# Patient Record
Sex: MALE | Race: ASIAN | Employment: OTHER | URBAN - METROPOLITAN AREA
[De-identification: names, ages, dates, MRNs, and addresses within clinical notes are randomized per-mention and may not be internally consistent; named-entity substitution may affect disease eponyms.]

---

## 2018-04-25 ENCOUNTER — LAB ENCOUNTER (OUTPATIENT)
Dept: LAB | Age: 53
End: 2018-04-25
Attending: INTERNAL MEDICINE
Payer: MEDICAID

## 2018-04-25 DIAGNOSIS — J45.40 ASTHMA, MODERATE PERSISTENT: Primary | ICD-10-CM

## 2018-04-25 DIAGNOSIS — H10.9 CONJUNCTIVITIS: ICD-10-CM

## 2018-04-25 DIAGNOSIS — J30.2 SEASONAL ALLERGIC RHINITIS: ICD-10-CM

## 2018-04-25 PROCEDURE — 86003 ALLG SPEC IGE CRUDE XTRC EA: CPT

## 2018-04-25 PROCEDURE — 80053 COMPREHEN METABOLIC PANEL: CPT

## 2018-04-25 PROCEDURE — 85025 COMPLETE CBC W/AUTO DIFF WBC: CPT

## 2018-04-25 PROCEDURE — 82785 ASSAY OF IGE: CPT

## 2018-04-25 PROCEDURE — 84443 ASSAY THYROID STIM HORMONE: CPT

## 2018-04-25 PROCEDURE — 36415 COLL VENOUS BLD VENIPUNCTURE: CPT

## 2018-04-25 PROCEDURE — 84439 ASSAY OF FREE THYROXINE: CPT

## 2019-09-24 ENCOUNTER — OFFICE VISIT (OUTPATIENT)
Dept: FAMILY MEDICINE CLINIC | Facility: CLINIC | Age: 54
End: 2019-09-24
Payer: MEDICAID

## 2019-09-24 ENCOUNTER — LAB ENCOUNTER (OUTPATIENT)
Dept: LAB | Age: 54
End: 2019-09-24
Attending: FAMILY MEDICINE
Payer: MEDICAID

## 2019-09-24 VITALS
BODY MASS INDEX: 25.48 KG/M2 | DIASTOLIC BLOOD PRESSURE: 80 MMHG | SYSTOLIC BLOOD PRESSURE: 112 MMHG | WEIGHT: 182 LBS | HEIGHT: 71 IN | TEMPERATURE: 98 F | RESPIRATION RATE: 16 BRPM | HEART RATE: 68 BPM

## 2019-09-24 DIAGNOSIS — Z12.5 SCREENING FOR MALIGNANT NEOPLASM OF PROSTATE: ICD-10-CM

## 2019-09-24 DIAGNOSIS — M25.50 ARTHRALGIA, UNSPECIFIED JOINT: ICD-10-CM

## 2019-09-24 DIAGNOSIS — Z00.00 WELLNESS EXAMINATION: ICD-10-CM

## 2019-09-24 DIAGNOSIS — Z12.11 SCREEN FOR COLON CANCER: ICD-10-CM

## 2019-09-24 DIAGNOSIS — Z00.00 WELLNESS EXAMINATION: Primary | ICD-10-CM

## 2019-09-24 PROBLEM — J45.20 MILD INTERMITTENT ASTHMA WITHOUT COMPLICATION (HCC): Status: ACTIVE | Noted: 2019-09-24

## 2019-09-24 PROBLEM — E78.2 MIXED HYPERLIPIDEMIA: Status: ACTIVE | Noted: 2019-09-24

## 2019-09-24 PROBLEM — J30.9 ALLERGIC RHINITIS: Status: ACTIVE | Noted: 2019-09-24

## 2019-09-24 PROBLEM — J45.20 MILD INTERMITTENT ASTHMA WITHOUT COMPLICATION: Status: ACTIVE | Noted: 2019-09-24

## 2019-09-24 LAB
ALBUMIN SERPL-MCNC: 3.8 G/DL (ref 3.4–5)
ALBUMIN/GLOB SERPL: 1.2 {RATIO} (ref 1–2)
ALP LIVER SERPL-CCNC: 81 U/L (ref 45–117)
ALT SERPL-CCNC: 34 U/L (ref 16–61)
ANION GAP SERPL CALC-SCNC: 6 MMOL/L (ref 0–18)
AST SERPL-CCNC: 28 U/L (ref 15–37)
BASOPHILS # BLD AUTO: 0.07 X10(3) UL (ref 0–0.2)
BASOPHILS NFR BLD AUTO: 1.1 %
BILIRUB SERPL-MCNC: 0.7 MG/DL (ref 0.1–2)
BUN BLD-MCNC: 18 MG/DL (ref 7–18)
BUN/CREAT SERPL: 19.8 (ref 10–20)
CALCIUM BLD-MCNC: 9.4 MG/DL (ref 8.5–10.1)
CHLORIDE SERPL-SCNC: 106 MMOL/L (ref 98–112)
CHOLEST SMN-MCNC: 168 MG/DL (ref ?–200)
CO2 SERPL-SCNC: 27 MMOL/L (ref 21–32)
COMPLEXED PSA SERPL-MCNC: 1.62 NG/ML (ref ?–4)
CREAT BLD-MCNC: 0.91 MG/DL (ref 0.7–1.3)
DEPRECATED RDW RBC AUTO: 43.3 FL (ref 35.1–46.3)
EOSINOPHIL # BLD AUTO: 0.5 X10(3) UL (ref 0–0.7)
EOSINOPHIL NFR BLD AUTO: 8.1 %
ERYTHROCYTE [DISTWIDTH] IN BLOOD BY AUTOMATED COUNT: 13.1 % (ref 11–15)
GLOBULIN PLAS-MCNC: 3.3 G/DL (ref 2.8–4.4)
GLUCOSE BLD-MCNC: 82 MG/DL (ref 70–99)
HCT VFR BLD AUTO: 42.3 % (ref 39–53)
HDLC SERPL-MCNC: 48 MG/DL (ref 40–59)
HGB BLD-MCNC: 14.2 G/DL (ref 13–17.5)
IMM GRANULOCYTES # BLD AUTO: 0.02 X10(3) UL (ref 0–1)
IMM GRANULOCYTES NFR BLD: 0.3 %
LDLC SERPL CALC-MCNC: 86 MG/DL (ref ?–100)
LYMPHOCYTES # BLD AUTO: 2.71 X10(3) UL (ref 1–4)
LYMPHOCYTES NFR BLD AUTO: 44.1 %
M PROTEIN MFR SERPL ELPH: 7.1 G/DL (ref 6.4–8.2)
MCH RBC QN AUTO: 30 PG (ref 26–34)
MCHC RBC AUTO-ENTMCNC: 33.6 G/DL (ref 31–37)
MCV RBC AUTO: 89.4 FL (ref 80–100)
MONOCYTES # BLD AUTO: 0.51 X10(3) UL (ref 0.1–1)
MONOCYTES NFR BLD AUTO: 8.3 %
NEUTROPHILS # BLD AUTO: 2.33 X10 (3) UL (ref 1.5–7.7)
NEUTROPHILS # BLD AUTO: 2.33 X10(3) UL (ref 1.5–7.7)
NEUTROPHILS NFR BLD AUTO: 38.1 %
NONHDLC SERPL-MCNC: 120 MG/DL (ref ?–130)
OSMOLALITY SERPL CALC.SUM OF ELEC: 289 MOSM/KG (ref 275–295)
PLATELET # BLD AUTO: 228 10(3)UL (ref 150–450)
POTASSIUM SERPL-SCNC: 3.8 MMOL/L (ref 3.5–5.1)
RBC # BLD AUTO: 4.73 X10(6)UL (ref 4.3–5.7)
SED RATE-ML: 9 MM/HR (ref 0–12)
SODIUM SERPL-SCNC: 139 MMOL/L (ref 136–145)
T4 FREE SERPL-MCNC: 0.9 NG/DL (ref 0.8–1.7)
TRIGL SERPL-MCNC: 168 MG/DL (ref 30–149)
TSI SER-ACNC: 4.11 MIU/ML (ref 0.36–3.74)
VLDLC SERPL CALC-MCNC: 34 MG/DL (ref 0–30)
WBC # BLD AUTO: 6.1 X10(3) UL (ref 4–11)

## 2019-09-24 PROCEDURE — 80061 LIPID PANEL: CPT

## 2019-09-24 PROCEDURE — 85652 RBC SED RATE AUTOMATED: CPT

## 2019-09-24 PROCEDURE — 99203 OFFICE O/P NEW LOW 30 MIN: CPT | Performed by: FAMILY MEDICINE

## 2019-09-24 PROCEDURE — 90686 IIV4 VACC NO PRSV 0.5 ML IM: CPT | Performed by: FAMILY MEDICINE

## 2019-09-24 PROCEDURE — 36415 COLL VENOUS BLD VENIPUNCTURE: CPT

## 2019-09-24 PROCEDURE — 90471 IMMUNIZATION ADMIN: CPT | Performed by: FAMILY MEDICINE

## 2019-09-24 PROCEDURE — 84439 ASSAY OF FREE THYROXINE: CPT

## 2019-09-24 PROCEDURE — 99386 PREV VISIT NEW AGE 40-64: CPT | Performed by: FAMILY MEDICINE

## 2019-09-24 PROCEDURE — 85025 COMPLETE CBC W/AUTO DIFF WBC: CPT

## 2019-09-24 PROCEDURE — 80053 COMPREHEN METABOLIC PANEL: CPT

## 2019-09-24 PROCEDURE — 84443 ASSAY THYROID STIM HORMONE: CPT

## 2019-09-24 RX ORDER — ATORVASTATIN CALCIUM 10 MG/1
TABLET, FILM COATED ORAL
Refills: 0 | COMMUNITY
Start: 2019-09-09 | End: 2019-10-24

## 2019-09-24 RX ORDER — ALBUTEROL SULFATE 90 UG/1
AEROSOL, METERED RESPIRATORY (INHALATION)
Refills: 3 | COMMUNITY
Start: 2019-07-31

## 2019-09-24 RX ORDER — MONTELUKAST SODIUM 10 MG/1
TABLET ORAL
Refills: 5 | COMMUNITY
Start: 2019-09-06

## 2019-09-24 RX ORDER — AZELASTINE HYDROCHLORIDE 0.5 MG/ML
SOLUTION/ DROPS OPHTHALMIC
Refills: 2 | COMMUNITY
Start: 2019-09-05 | End: 2021-12-22 | Stop reason: ALTCHOICE

## 2019-09-24 RX ORDER — CETIRIZINE HYDROCHLORIDE 10 MG/1
10 TABLET ORAL 2 TIMES DAILY
COMMUNITY

## 2019-09-24 NOTE — PATIENT INSTRUCTIONS
Thank you for choosing Juana Medellin MD at [place] [address]  To Do: [address]  1. Please see age appropriate health prevention below    [facility] is located in [address]. Monday, Tuesday & Friday – 8 a.m. to 4 p.m.   Wednesday, Thursda the benefits outweigh those potential risks and we strive to make you healthier and to improve your quality of life.     Referrals, and Radiology Information:    If your insurance requires a referral to a specialist, please allow 5 business days to process

## 2019-09-24 NOTE — H&P
Wellness Exam    REASON FOR VISIT:    Tang Price is a 48year old male who presents for an [address].     Current Complaints: Mr. Jerris Cranker is a pleasant 49 y/o M here for his wellness exam  Flu Shot: see immunization record  Health Maintenance Hepatitis B, Tetanus, or Pneumococcal?: No    Domestic Abuse: No     CAGE:     Cut : No    Annoyed : No    Guilty : No    Eye Opener : No    Scoring  Total Score: 0     PHQ-4: Over the LAST 2 WEEKS       Depression Screening (PHQ-2/PHQ-9): Over the LAST 2 Sodium 10 MG Oral Tab TK 1 T PO QD HS Disp:  Rfl: 5   atorvastatin 10 MG Oral Tab TK 1 T PO QD Disp:  Rfl: 0   Azelastine HCl 0.05 % Ophthalmic Solution INSTILL 1 DROP IN EACH EYE PRN Disp:  Rfl: 2   cetirizine 10 MG Oral Tab Take 10 mg by mouth daily.  Dis Ear: TMs visible and normal bilaterally  Nose: Nose normal.   Eyes: EOM are normal. Pupils are equal, round, and reactive to light. No scleral icterus. Neck: Normal range of motion. No thyromegaly present.    Cardiovascular: Normal rate, regular rhythm Future    Other orders  -     FLULAVAL INFLUENZA VACCINE QUAD PRESERVATIVE FREE 0.5 ML    Follow-up in 6 months or as needed    Orders Placed This Encounter      CBC With Differential With Platelet      Comp Metabolic Panel (14)      Lipid Panel      TSH W

## 2019-09-26 ENCOUNTER — TELEPHONE (OUTPATIENT)
Dept: FAMILY MEDICINE CLINIC | Facility: CLINIC | Age: 54
End: 2019-09-26

## 2019-09-26 NOTE — TELEPHONE ENCOUNTER
Spoke with pt, states that he was given a referral for Dr. Ketan Gardiner for his colonoscopy. Pt states that Dr. Ketan Gardiner does not take his insurance.  Advised pt to call phone # on the back of his insurance card to find out who is covered and to call back to let

## 2019-09-26 NOTE — TELEPHONE ENCOUNTER
Adams Mahoney Pt was trying to schedule a colonoscopy and was told they do not accept his insurance. Can you tell the patient were they can go for this procedure? Will patient need a new order written?     Please call [phone]

## 2019-10-22 ENCOUNTER — TELEPHONE (OUTPATIENT)
Dept: FAMILY MEDICINE CLINIC | Facility: CLINIC | Age: 54
End: 2019-10-22

## 2019-10-22 DIAGNOSIS — E03.9 HYPOTHYROIDISM, UNSPECIFIED TYPE: Primary | ICD-10-CM

## 2019-10-22 NOTE — TELEPHONE ENCOUNTER
Pt inquiring about lab results, labs done 9/24/19, please advise.     TSH: 4.1 - no dx of hypothyroidism, pt not on any medications

## 2019-10-22 NOTE — TELEPHONE ENCOUNTER
Recommend starting on levothyroxine 50 mcg daily. Please check thyroid ultrasound.   Recheck TSH and T4 in 6 weeks per

## 2019-10-23 NOTE — TELEPHONE ENCOUNTER
Spoke to pt, informed of MD recommendations below. Pt states he has an appt tomorrow, 10/24/19, and would like to discuss results at appt before starting medications. Repeat labs and US ordered.

## 2019-10-24 ENCOUNTER — OFFICE VISIT (OUTPATIENT)
Dept: FAMILY MEDICINE CLINIC | Facility: CLINIC | Age: 54
End: 2019-10-24
Payer: MEDICAID

## 2019-10-24 VITALS
TEMPERATURE: 98 F | HEIGHT: 71 IN | DIASTOLIC BLOOD PRESSURE: 70 MMHG | BODY MASS INDEX: 26.18 KG/M2 | HEART RATE: 70 BPM | RESPIRATION RATE: 16 BRPM | SYSTOLIC BLOOD PRESSURE: 120 MMHG | WEIGHT: 187 LBS

## 2019-10-24 DIAGNOSIS — E03.9 HYPOTHYROIDISM, UNSPECIFIED TYPE: Primary | ICD-10-CM

## 2019-10-24 DIAGNOSIS — E78.2 MIXED HYPERLIPIDEMIA: ICD-10-CM

## 2019-10-24 PROCEDURE — 99213 OFFICE O/P EST LOW 20 MIN: CPT | Performed by: FAMILY MEDICINE

## 2019-10-24 RX ORDER — LEVOTHYROXINE SODIUM 0.05 MG/1
50 TABLET ORAL
Qty: 90 TABLET | Refills: 1 | Status: SHIPPED | OUTPATIENT
Start: 2019-10-24 | End: 2020-02-17

## 2019-10-24 RX ORDER — ATORVASTATIN CALCIUM 10 MG/1
10 TABLET, FILM COATED ORAL NIGHTLY
Qty: 90 TABLET | Refills: 1 | Status: SHIPPED | OUTPATIENT
Start: 2019-10-24 | End: 2021-03-08

## 2019-10-24 NOTE — PROGRESS NOTES
HPI:    Patient ID: Manuel Leonard is a 48year old male.     HPI  Mr. Simin Mehta is a pleasant 49 y/o M with history of dyslipidemia and allergies here for his follow-up appointment he had labs done in September which showed normal LDL levels which was less than 10 heard.  Pulmonary/Chest: Effort normal and breath sounds normal. No respiratory distress. He has no wheezes. He has no rales. Abdominal: Soft. Bowel sounds are normal. He exhibits no distension. There is no tenderness.  Musculoskeletal:         General: N

## 2019-10-24 NOTE — PATIENT INSTRUCTIONS
Thank you for choosing Wanda Apodaca MD at [place] [address]  To Do: [address]  1. PLEASE HAVE TSH AND RAYNE SCREEN DONE IN 6-8 weeks. [facility] is located in [address]. Monday, Tuesday & Friday – 8 a.m. to 4 p.m.   Wednesday, Thursday – 7 benefits outweigh those potential risks and we strive to make you healthier and to improve your quality of life.     Referrals, and Radiology Information:    If your insurance requires a referral to a specialist, please allow 5 business days to process your

## 2019-12-04 ENCOUNTER — OFFICE VISIT (OUTPATIENT)
Dept: FAMILY MEDICINE CLINIC | Facility: CLINIC | Age: 54
End: 2019-12-04
Payer: MEDICAID

## 2019-12-04 VITALS
WEIGHT: 186 LBS | TEMPERATURE: 99 F | BODY MASS INDEX: 26.04 KG/M2 | DIASTOLIC BLOOD PRESSURE: 70 MMHG | RESPIRATION RATE: 16 BRPM | SYSTOLIC BLOOD PRESSURE: 120 MMHG | HEART RATE: 64 BPM | HEIGHT: 71 IN

## 2019-12-04 DIAGNOSIS — G89.29 CHRONIC RIGHT SHOULDER PAIN: ICD-10-CM

## 2019-12-04 DIAGNOSIS — H92.02 LEFT EAR PAIN: Primary | ICD-10-CM

## 2019-12-04 DIAGNOSIS — M25.511 CHRONIC RIGHT SHOULDER PAIN: ICD-10-CM

## 2019-12-04 PROCEDURE — 99214 OFFICE O/P EST MOD 30 MIN: CPT | Performed by: FAMILY MEDICINE

## 2019-12-04 NOTE — PROGRESS NOTES
HPI:    Patient ID: Jerry Hull is a 48year old male. HPI  Mr. Betzy Dodge is a pleasant 32-year-old gentleman with history of hypothyroidism, hyperlipidemia, asthma, allergies here today for left ear pain which has been ongoing for the past 3 weeks.   He kassandra breakfast. 90 tablet 1   • Albuterol Sulfate  (90 Base) MCG/ACT Inhalation Aero Soln INL 2 PFS PO Q 6 H PRN  3   • Montelukast Sodium 10 MG Oral Tab TK 1 T PO QD HS  5   • Azelastine HCl 0.05 % Ophthalmic Solution INSTILL 1 DROP IN EACH EYE PRN  2 Referrals:  ENT - INTERNAL  MRI SHOULDER, RIGHT (CPT=73221)       [id number]

## 2019-12-04 NOTE — PATIENT INSTRUCTIONS
Thank you for choosing Marin Reeder MD at [facility]  To Do: [address]  1. Please have MRI done as ordered  [facility] is located in [address]. Monday, Tuesday & Friday – 8 a.m. to 4 p.m. Wednesday, Thursday – 7 a.m. to 3 p.m.   Feli Sadler those potential risks and we strive to make you healthier and to improve your quality of life.     Referrals, and Radiology Information:    If your insurance requires a referral to a specialist, please allow 5 business days to process your referral request.

## 2019-12-23 ENCOUNTER — LAB ENCOUNTER (OUTPATIENT)
Dept: LAB | Age: 54
End: 2019-12-23
Attending: FAMILY MEDICINE
Payer: MEDICAID

## 2019-12-23 ENCOUNTER — OFFICE VISIT (OUTPATIENT)
Dept: FAMILY MEDICINE CLINIC | Facility: CLINIC | Age: 54
End: 2019-12-23
Payer: MEDICAID

## 2019-12-23 VITALS
RESPIRATION RATE: 16 BRPM | HEIGHT: 71 IN | BODY MASS INDEX: 26.04 KG/M2 | WEIGHT: 186 LBS | DIASTOLIC BLOOD PRESSURE: 80 MMHG | SYSTOLIC BLOOD PRESSURE: 120 MMHG | TEMPERATURE: 98 F | HEART RATE: 60 BPM

## 2019-12-23 DIAGNOSIS — M25.512 CHRONIC PAIN OF BOTH SHOULDERS: ICD-10-CM

## 2019-12-23 DIAGNOSIS — G89.29 CHRONIC PAIN OF BOTH SHOULDERS: ICD-10-CM

## 2019-12-23 DIAGNOSIS — E03.9 HYPOTHYROIDISM, UNSPECIFIED TYPE: ICD-10-CM

## 2019-12-23 DIAGNOSIS — J06.9 URTI (ACUTE UPPER RESPIRATORY INFECTION): Primary | ICD-10-CM

## 2019-12-23 DIAGNOSIS — M25.511 CHRONIC PAIN OF BOTH SHOULDERS: ICD-10-CM

## 2019-12-23 PROCEDURE — 99213 OFFICE O/P EST LOW 20 MIN: CPT | Performed by: FAMILY MEDICINE

## 2019-12-23 PROCEDURE — 84443 ASSAY THYROID STIM HORMONE: CPT

## 2019-12-23 PROCEDURE — 84439 ASSAY OF FREE THYROXINE: CPT

## 2019-12-23 PROCEDURE — 86038 ANTINUCLEAR ANTIBODIES: CPT

## 2019-12-23 PROCEDURE — 36415 COLL VENOUS BLD VENIPUNCTURE: CPT

## 2019-12-23 RX ORDER — AZITHROMYCIN 250 MG/1
TABLET, FILM COATED ORAL
Qty: 6 TABLET | Refills: 0 | Status: SHIPPED | OUTPATIENT
Start: 2019-12-23 | End: 2020-01-04 | Stop reason: ALTCHOICE

## 2020-01-04 ENCOUNTER — OFFICE VISIT (OUTPATIENT)
Dept: FAMILY MEDICINE CLINIC | Facility: CLINIC | Age: 55
End: 2020-01-04
Payer: MEDICAID

## 2020-01-04 VITALS
DIASTOLIC BLOOD PRESSURE: 72 MMHG | RESPIRATION RATE: 16 BRPM | HEART RATE: 72 BPM | WEIGHT: 186 LBS | SYSTOLIC BLOOD PRESSURE: 112 MMHG | OXYGEN SATURATION: 97 % | TEMPERATURE: 98 F | HEIGHT: 71 IN | BODY MASS INDEX: 26.04 KG/M2

## 2020-01-04 DIAGNOSIS — M25.50 ARTHRALGIA, UNSPECIFIED JOINT: ICD-10-CM

## 2020-01-04 DIAGNOSIS — R79.89 ELEVATED TSH: ICD-10-CM

## 2020-01-04 DIAGNOSIS — R05.3 CHRONIC COUGH: Primary | ICD-10-CM

## 2020-01-04 PROCEDURE — 99214 OFFICE O/P EST MOD 30 MIN: CPT | Performed by: FAMILY MEDICINE

## 2020-01-04 RX ORDER — AMOXICILLIN AND CLAVULANATE POTASSIUM 875; 125 MG/1; MG/1
TABLET, FILM COATED ORAL
COMMUNITY
Start: 2019-12-28 | End: 2020-02-17

## 2020-01-04 NOTE — PATIENT INSTRUCTIONS
Thank you for choosing Cherelle Beltran MD at [place] [address]  To Do: [address]  1. Please take meds as directed. [facility] is located in [address]. Monday, Tuesday & Friday – 8 a.m. to 4 p.m. Wednesday, Thursday – 7 a.m. to 3 p.m.   Aniket Vargas those potential risks and we strive to make you healthier and to improve your quality of life.     Referrals, and Radiology Information:    If your insurance requires a referral to a specialist, please allow 5 business days to process your referral request.

## 2020-01-04 NOTE — PROGRESS NOTES
HPI:    Patient ID: Jayla Sullivan is a 48year old male.     HPI  Mr. Denney Primrose is a pleasant 16-year-old gentleman with history of hypothyroidism, hyperlipidemia, asthma, allergies here today for follow up for chronic cough and congestion and was treated by me wi (50 mcg total) by mouth before breakfast. (Patient not taking: Reported on 1/4/2020 ) 90 tablet 1     Allergies:No Known Allergies   PHYSICAL EXAM:   Physical Exam  Constitutional: No distress.    HENT:   Right Ear: Tympanic membrane, external ear and ear c

## 2020-02-17 PROBLEM — S09.302A: Status: ACTIVE | Noted: 2020-02-17

## 2020-02-25 ENCOUNTER — OFFICE VISIT (OUTPATIENT)
Dept: FAMILY MEDICINE CLINIC | Facility: CLINIC | Age: 55
End: 2020-02-25
Payer: MEDICAID

## 2020-02-25 VITALS
HEIGHT: 72 IN | HEART RATE: 97 BPM | RESPIRATION RATE: 16 BRPM | TEMPERATURE: 98 F | OXYGEN SATURATION: 98 % | WEIGHT: 185 LBS | BODY MASS INDEX: 25.06 KG/M2 | SYSTOLIC BLOOD PRESSURE: 124 MMHG | DIASTOLIC BLOOD PRESSURE: 80 MMHG

## 2020-02-25 DIAGNOSIS — L03.011 CELLULITIS OF INDEX FINGER, RIGHT: Primary | ICD-10-CM

## 2020-02-25 PROCEDURE — 99213 OFFICE O/P EST LOW 20 MIN: CPT | Performed by: FAMILY MEDICINE

## 2020-02-25 RX ORDER — CEPHALEXIN 500 MG/1
500 CAPSULE ORAL 3 TIMES DAILY
Qty: 30 CAPSULE | Refills: 0 | Status: SHIPPED | OUTPATIENT
Start: 2020-02-25 | End: 2020-03-06

## 2020-02-25 NOTE — PROGRESS NOTES
HPI:    Patient ID: Karmen Stoll is a 48year old male.     HPI  Mr. Fidelia Morin is a pleasant 53-year-old gentleman with history of allergies here today for swelling and redness of the right second finger which she noticed Friday while he was cleaning the area c Cellulitis of index finger, right  (primary encounter diagnosis)  -Educated on this condition; will treat with Keflex as below; keep protected at this point; I have asked him to go to the hospital if swelling in redness migrates to his arm.   If he develops

## 2020-02-25 NOTE — PATIENT INSTRUCTIONS
Thank you for choosing Yesenia Diaz MD at [place] [address]  To Do: [address]  1. Please take meds as directed. [facility] is located in [address]. Monday, Tuesday & Friday – 8 a.m. to 4 p.m. Wednesday, Thursday – 7 a.m. to 3 p.m. those potential risks and we strive to make you healthier and to improve your quality of life.     Referrals, and Radiology Information:    If your insurance requires a referral to a specialist, please allow 5 business days to process your referral request. number of days until they are gone. Keep taking the medicine even if your symptoms go away. Home care  Follow these tips:  · Limit the use of the part of your body with cellulitis.   · If the infection is on your leg, keep your leg raised while sitting.  Nuria May

## 2020-02-28 ENCOUNTER — OFFICE VISIT (OUTPATIENT)
Dept: FAMILY MEDICINE CLINIC | Facility: CLINIC | Age: 55
End: 2020-02-28
Payer: MEDICAID

## 2020-02-28 ENCOUNTER — HOSPITAL ENCOUNTER (OUTPATIENT)
Dept: GENERAL RADIOLOGY | Age: 55
Discharge: HOME | End: 2020-02-28
Attending: PHYSICIAN ASSISTANT
Payer: MEDICAID

## 2020-02-28 ENCOUNTER — LAB ENCOUNTER (OUTPATIENT)
Dept: LAB | Age: 55
End: 2020-02-28
Attending: FAMILY MEDICINE
Payer: MEDICAID

## 2020-02-28 VITALS
TEMPERATURE: 97 F | DIASTOLIC BLOOD PRESSURE: 72 MMHG | WEIGHT: 186 LBS | OXYGEN SATURATION: 95 % | HEIGHT: 72 IN | SYSTOLIC BLOOD PRESSURE: 122 MMHG | BODY MASS INDEX: 25.19 KG/M2 | HEART RATE: 84 BPM | RESPIRATION RATE: 14 BRPM

## 2020-02-28 DIAGNOSIS — M79.671 RIGHT FOOT PAIN: Primary | ICD-10-CM

## 2020-02-28 DIAGNOSIS — M79.671 RIGHT FOOT PAIN: ICD-10-CM

## 2020-02-28 LAB
ALBUMIN SERPL-MCNC: 3.4 G/DL (ref 3.4–5)
ALBUMIN/GLOB SERPL: 0.9 {RATIO} (ref 1–2)
ALP LIVER SERPL-CCNC: 86 U/L (ref 45–117)
ALT SERPL-CCNC: 32 U/L (ref 16–61)
ANION GAP SERPL CALC-SCNC: 4 MMOL/L (ref 0–18)
AST SERPL-CCNC: 24 U/L (ref 15–37)
BASOPHILS # BLD AUTO: 0.1 X10(3) UL (ref 0–0.2)
BASOPHILS NFR BLD AUTO: 1.7 %
BILIRUB SERPL-MCNC: 1 MG/DL (ref 0.1–2)
BUN BLD-MCNC: 17 MG/DL (ref 7–18)
BUN/CREAT SERPL: 19.3 (ref 10–20)
CALCIUM BLD-MCNC: 8.7 MG/DL (ref 8.5–10.1)
CHLORIDE SERPL-SCNC: 108 MMOL/L (ref 98–112)
CO2 SERPL-SCNC: 26 MMOL/L (ref 21–32)
CREAT BLD-MCNC: 0.88 MG/DL (ref 0.7–1.3)
DEPRECATED RDW RBC AUTO: 42.8 FL (ref 35.1–46.3)
EOSINOPHIL # BLD AUTO: 0.54 X10(3) UL (ref 0–0.7)
EOSINOPHIL NFR BLD AUTO: 8.9 %
ERYTHROCYTE [DISTWIDTH] IN BLOOD BY AUTOMATED COUNT: 13 % (ref 11–15)
GLOBULIN PLAS-MCNC: 3.9 G/DL (ref 2.8–4.4)
GLUCOSE BLD-MCNC: 101 MG/DL (ref 70–99)
HCT VFR BLD AUTO: 42.4 % (ref 39–53)
HGB BLD-MCNC: 14.1 G/DL (ref 13–17.5)
IMM GRANULOCYTES # BLD AUTO: 0.01 X10(3) UL (ref 0–1)
IMM GRANULOCYTES NFR BLD: 0.2 %
LYMPHOCYTES # BLD AUTO: 2.78 X10(3) UL (ref 1–4)
LYMPHOCYTES NFR BLD AUTO: 46 %
M PROTEIN MFR SERPL ELPH: 7.3 G/DL (ref 6.4–8.2)
MCH RBC QN AUTO: 29.6 PG (ref 26–34)
MCHC RBC AUTO-ENTMCNC: 33.3 G/DL (ref 31–37)
MCV RBC AUTO: 89.1 FL (ref 80–100)
MONOCYTES # BLD AUTO: 0.42 X10(3) UL (ref 0.1–1)
MONOCYTES NFR BLD AUTO: 6.9 %
NEUTROPHILS # BLD AUTO: 2.2 X10 (3) UL (ref 1.5–7.7)
NEUTROPHILS # BLD AUTO: 2.2 X10(3) UL (ref 1.5–7.7)
NEUTROPHILS NFR BLD AUTO: 36.3 %
OSMOLALITY SERPL CALC.SUM OF ELEC: 288 MOSM/KG (ref 275–295)
PATIENT FASTING Y/N/NP: NO
PLATELET # BLD AUTO: 243 10(3)UL (ref 150–450)
POTASSIUM SERPL-SCNC: 3.9 MMOL/L (ref 3.5–5.1)
RBC # BLD AUTO: 4.76 X10(6)UL (ref 4.3–5.7)
SODIUM SERPL-SCNC: 138 MMOL/L (ref 136–145)
URATE SERPL-MCNC: 5.2 MG/DL (ref 3.5–7.2)
WBC # BLD AUTO: 6.1 X10(3) UL (ref 4–11)

## 2020-02-28 PROCEDURE — 36415 COLL VENOUS BLD VENIPUNCTURE: CPT

## 2020-02-28 PROCEDURE — 99213 OFFICE O/P EST LOW 20 MIN: CPT | Performed by: PHYSICIAN ASSISTANT

## 2020-02-28 PROCEDURE — 73630 X-RAY EXAM OF FOOT: CPT | Performed by: PHYSICIAN ASSISTANT

## 2020-02-28 PROCEDURE — 85025 COMPLETE CBC W/AUTO DIFF WBC: CPT

## 2020-02-28 PROCEDURE — 80053 COMPREHEN METABOLIC PANEL: CPT

## 2020-02-28 PROCEDURE — 84550 ASSAY OF BLOOD/URIC ACID: CPT

## 2020-02-28 RX ORDER — TRAMADOL HYDROCHLORIDE 50 MG/1
50 TABLET ORAL EVERY 8 HOURS PRN
Qty: 30 TABLET | Refills: 0 | Status: SHIPPED | OUTPATIENT
Start: 2020-02-28 | End: 2020-03-20

## 2020-02-28 RX ORDER — NAPROXEN 500 MG/1
500 TABLET ORAL 2 TIMES DAILY WITH MEALS
Qty: 30 TABLET | Refills: 0 | Status: SHIPPED | OUTPATIENT
Start: 2020-02-28 | End: 2020-03-20

## 2020-02-28 NOTE — PROGRESS NOTES
[facility] Internal Medicine Progress Note    CC:  Patient presents with: Foot Pain: right foot started yesterday morning. HPI:   HPI   Pain started yesterday right foot pain. He denies any injury. He states painful to even walk.   No hist Skin: Negative for color change, rash and wound. Neurological: Negative for dizziness, light-headedness and headaches.          /72   Pulse 84   Temp 97.4 °F (36.3 °C) (Temporal)   Resp 14   Ht 72\"   Wt 186 lb (84.4 kg)   SpO2 95%   BMI 25.23 kg/m² Sig: Take 1 tablet (50 mg total) by mouth every 8 (eight) hours as needed for Pain (Do not take and drive, will make drowsy).        Imaging & Consults:  XR FOOT, COMPLETE (MIN 3 VIEWS), RIGHT (CPT=73630)     Patient/Caregiver Education: Patient/Caregiver

## 2020-02-28 NOTE — PATIENT INSTRUCTIONS
Thank you for choosing Ludy Pope PA-C at [place] [address]  To Do: [address]  1. Begin medications as prescribed  2. Get xrays done  3.  Follow-up 1-2 weeks, sooner if problems    • Please signup for MY CHART, which is electronic access to yo your testing, please call Central Scheduling at [phone]  Please allow our office 5 business days to contact you regarding any testing results.     Refill policies:   Allow 3 business days for refills; controlled substances may take longer and must be p

## 2020-03-01 NOTE — PROGRESS NOTES
Labs are stable  Uric acid level is not elevated  Continue with current treatment plan and follow-up as directed

## 2020-03-20 ENCOUNTER — TELEPHONE (OUTPATIENT)
Dept: FAMILY MEDICINE CLINIC | Facility: CLINIC | Age: 55
End: 2020-03-20

## 2020-03-20 DIAGNOSIS — M79.671 RIGHT FOOT PAIN: ICD-10-CM

## 2020-03-20 RX ORDER — TRAMADOL HYDROCHLORIDE 50 MG/1
50 TABLET ORAL EVERY 8 HOURS PRN
Qty: 30 TABLET | Refills: 0 | Status: SHIPPED | OUTPATIENT
Start: 2020-03-20 | End: 2020-08-24

## 2020-03-20 RX ORDER — NAPROXEN 500 MG/1
500 TABLET ORAL 2 TIMES DAILY WITH MEALS
Qty: 30 TABLET | Refills: 0 | Status: SHIPPED | OUTPATIENT
Start: 2020-03-20 | End: 2020-04-20

## 2020-03-20 NOTE — TELEPHONE ENCOUNTER
Spoke to pt, informed of MD recommendations below. Advised pt to go to [facility] for evaluation and NOT to go to either of the ERs. Pt verbalized understanding and agreement. Task completed.

## 2020-03-20 NOTE — TELEPHONE ENCOUNTER
Patient states that he saw Giselle Vogel in 2/25/2020 for cellulitis in his finger. He says he completed abx that was given but he is still having issue and not getting better. Pt states he would like a call from  to discuss.  He is also asking for

## 2020-04-20 ENCOUNTER — VIRTUAL PHONE E/M (OUTPATIENT)
Dept: FAMILY MEDICINE CLINIC | Facility: CLINIC | Age: 55
End: 2020-04-20
Payer: MEDICAID

## 2020-04-20 DIAGNOSIS — M79.671 RIGHT FOOT PAIN: ICD-10-CM

## 2020-04-20 DIAGNOSIS — M79.644 FINGER PAIN, RIGHT: ICD-10-CM

## 2020-04-20 PROCEDURE — 99214 OFFICE O/P EST MOD 30 MIN: CPT | Performed by: FAMILY MEDICINE

## 2020-04-20 RX ORDER — NAPROXEN 500 MG/1
500 TABLET ORAL 2 TIMES DAILY WITH MEALS
Qty: 30 TABLET | Refills: 0 | Status: SHIPPED | OUTPATIENT
Start: 2020-04-20 | End: 2020-08-24

## 2020-04-20 NOTE — PATIENT INSTRUCTIONS
Thank you for choosing Murali Martinez MD at [place] [address]  To Do: [address] [facility]  1. Please take meds as directed. 2.  I had sent a referral for the foot doctor/podiatry  3. I had refilled naproxen and sent to the pharmacy  4.   Please have x-ray o harm or side effects or medication interactions.  It is your duty and for your safety to discuss with the pharmacist and our office with questions, and to notify us and stop treatment if problems arise, but know that our intention is that the benefits outw

## 2020-04-20 NOTE — PROGRESS NOTES
Virtual Telephone Check-In    Aline Montemayor verbally consents to a Virtual/Telephone Check-In visit on 04/20/20. Patient understands and accepts financial responsibility for any deductible, co-insurance and/or co-pays associated with this service.     Du Tab Take 10 mg by mouth daily.         Past Medical History:   Diagnosis Date   • Allergic rhinitis    • Asthma    • Hyperlipidemia       Social History:  Social History    Tobacco Use      Smoking status: Former Smoker      Smokeless tobacco: Former User

## 2020-05-05 ENCOUNTER — TELEPHONE (OUTPATIENT)
Dept: PODIATRY CLINIC | Facility: CLINIC | Age: 55
End: 2020-05-05

## 2020-05-09 ENCOUNTER — HOSPITAL ENCOUNTER (OUTPATIENT)
Dept: GENERAL RADIOLOGY | Age: 55
Discharge: HOME | End: 2020-05-09
Attending: FAMILY MEDICINE
Payer: MEDICAID

## 2020-05-09 DIAGNOSIS — M79.644 FINGER PAIN, RIGHT: ICD-10-CM

## 2020-05-09 PROCEDURE — 73130 X-RAY EXAM OF HAND: CPT | Performed by: FAMILY MEDICINE

## 2020-05-14 ENCOUNTER — TELEPHONE (OUTPATIENT)
Dept: FAMILY MEDICINE CLINIC | Facility: CLINIC | Age: 55
End: 2020-05-14

## 2020-05-14 NOTE — TELEPHONE ENCOUNTER
Spoke with pt, informed of normal R hand x-ray results. Pt verbalized understanding and agreement. All questions answered. Task completed.

## 2020-08-24 ENCOUNTER — OFFICE VISIT (OUTPATIENT)
Dept: FAMILY MEDICINE CLINIC | Facility: CLINIC | Age: 55
End: 2020-08-24
Payer: MEDICAID

## 2020-08-24 VITALS
WEIGHT: 190 LBS | OXYGEN SATURATION: 98 % | HEIGHT: 72 IN | HEART RATE: 77 BPM | DIASTOLIC BLOOD PRESSURE: 74 MMHG | RESPIRATION RATE: 16 BRPM | SYSTOLIC BLOOD PRESSURE: 122 MMHG | TEMPERATURE: 97 F | BODY MASS INDEX: 25.73 KG/M2

## 2020-08-24 DIAGNOSIS — E78.2 MIXED HYPERLIPIDEMIA: ICD-10-CM

## 2020-08-24 DIAGNOSIS — M54.9 ACUTE LEFT-SIDED BACK PAIN, UNSPECIFIED BACK LOCATION: Primary | ICD-10-CM

## 2020-08-24 PROCEDURE — 3008F BODY MASS INDEX DOCD: CPT | Performed by: FAMILY MEDICINE

## 2020-08-24 PROCEDURE — 3078F DIAST BP <80 MM HG: CPT | Performed by: FAMILY MEDICINE

## 2020-08-24 PROCEDURE — 3074F SYST BP LT 130 MM HG: CPT | Performed by: FAMILY MEDICINE

## 2020-08-24 PROCEDURE — 99214 OFFICE O/P EST MOD 30 MIN: CPT | Performed by: FAMILY MEDICINE

## 2020-08-24 RX ORDER — CYCLOBENZAPRINE HCL 10 MG
10 TABLET ORAL 3 TIMES DAILY
Qty: 30 TABLET | Refills: 1 | Status: SHIPPED | OUTPATIENT
Start: 2020-08-24 | End: 2020-09-13

## 2020-08-24 NOTE — PATIENT INSTRUCTIONS
Thank you for choosing Erin Alexandre MD at [place] [address]  To Do: [address]  1. Please take meds as directed. [facility] is located in [address]. Monday, Tuesday & Friday – 8 a.m. to 4 p.m. Wednesday, Thursday – 7 a.m. to 3 p.m. those potential risks and we strive to make you healthier and to improve your quality of life.     Referrals, and Radiology Information:    If your insurance requires a referral to a specialist, please allow 5 business days to process your referral request.

## 2020-08-24 NOTE — PROGRESS NOTES
HPI:    Patient ID: Chelsy Tyler is a 50year old male. HPI  Mr. Yaneth Vargas is a pleasant 53-year-old gentleman with history of asthma and hyperlipidemia here today for back pain which started several days ago.   It used to involve almost his entire back and • Azelastine HCl 0.05 % Ophthalmic Solution INSTILL 1 DROP IN EACH EYE PRN  2   • cetirizine 10 MG Oral Tab Take 10 mg by mouth daily. Allergies:No Known Allergies   PHYSICAL EXAM:   Physical Exam   Constitutional: No distress.    HENT:   Mouth/Throat

## 2020-11-04 ENCOUNTER — TELEMEDICINE (OUTPATIENT)
Dept: FAMILY MEDICINE CLINIC | Facility: CLINIC | Age: 55
End: 2020-11-04
Payer: MEDICAID

## 2020-11-04 DIAGNOSIS — R50.9 FEVER, UNSPECIFIED FEVER CAUSE: Primary | ICD-10-CM

## 2020-11-04 PROCEDURE — 99213 OFFICE O/P EST LOW 20 MIN: CPT | Performed by: FAMILY MEDICINE

## 2020-11-04 NOTE — PROGRESS NOTES
Video Check-In    Leroy Sesay verbally consents to a Virtual/Telephone Check-In service on 11/04/20. Patient understands and accepts financial responsibility for any deductible, co-insurance and/or co-pays associated with this service.     Duration of th

## 2020-11-04 NOTE — PATIENT INSTRUCTIONS
Thank you for choosing Julieth MD Dave at [place] [address]  To Do: [address] [facility]  1. Coronavirus Disease 2019 (COVID-19)     [facility] is committed to the safety and well-being of our patients, members, employees, and communities.  As co 2. Monitor your symptoms carefully. If your symptoms get worse, call your healthcare provider immediately. 3. Get rest and stay hydrated.    4. If you have a medical appointment, call the healthcare provider ahead of time and tell them that you have or may ? At least 24 hours have passed since recovery defined as resolution of fever without the use of fever-reducing medications; and  · Improvement in respiratory symptoms (e.g., cough, shortness of breath); and  · At least 10 days have passed since symptoms f If you would be interested in donating your plasma to help treat others diagnosed with the virus, please contact Ayanna directly on their website: [email or web address]    Who is eligible to donate convalescent plasma? • You can NOW use [employer] to book your appointments with us, or consider using open access scheduling which is through the edward website [email or web address] and type in Jose Head MD and follow the links for \"Schedule Online Now\"    •To sc Please allow our office 5 business days to contact you regarding any testing results. Refill policies:   Allow 3 business days for refills; controlled substances may take longer and must be picked up from the office in person.   Narcotic medications can

## 2020-11-06 ENCOUNTER — APPOINTMENT (OUTPATIENT)
Dept: LAB | Age: 55
End: 2020-11-06
Attending: FAMILY MEDICINE
Payer: MEDICAID

## 2020-11-06 DIAGNOSIS — R50.9 FEVER, UNSPECIFIED FEVER CAUSE: ICD-10-CM

## 2020-11-10 ENCOUNTER — TELEPHONE (OUTPATIENT)
Dept: FAMILY MEDICINE CLINIC | Facility: CLINIC | Age: 55
End: 2020-11-10

## 2020-11-10 NOTE — TELEPHONE ENCOUNTER
Called pt to answer his questions. Pt wanting to know when he can return to work. Reviewed CDC guidelines with pt, at least 10 days, symptom improvement, and nor fever for at least 24 hours without the use of fever reducing medications.  Also informed pt t

## 2020-12-01 ENCOUNTER — LAB ENCOUNTER (OUTPATIENT)
Dept: LAB | Age: 55
End: 2020-12-01
Attending: FAMILY MEDICINE
Payer: MEDICAID

## 2020-12-01 DIAGNOSIS — E78.2 MIXED HYPERLIPIDEMIA: ICD-10-CM

## 2020-12-01 PROCEDURE — 36415 COLL VENOUS BLD VENIPUNCTURE: CPT

## 2020-12-01 PROCEDURE — 80053 COMPREHEN METABOLIC PANEL: CPT

## 2020-12-01 PROCEDURE — 84443 ASSAY THYROID STIM HORMONE: CPT

## 2020-12-01 PROCEDURE — 80061 LIPID PANEL: CPT

## 2020-12-01 PROCEDURE — 85025 COMPLETE CBC W/AUTO DIFF WBC: CPT

## 2020-12-01 PROCEDURE — 84439 ASSAY OF FREE THYROXINE: CPT

## 2020-12-11 ENCOUNTER — OFFICE VISIT (OUTPATIENT)
Dept: FAMILY MEDICINE CLINIC | Facility: CLINIC | Age: 55
End: 2020-12-11
Payer: MEDICAID

## 2020-12-11 VITALS
WEIGHT: 185 LBS | HEIGHT: 72 IN | RESPIRATION RATE: 16 BRPM | OXYGEN SATURATION: 97 % | BODY MASS INDEX: 25.06 KG/M2 | HEART RATE: 84 BPM | SYSTOLIC BLOOD PRESSURE: 124 MMHG | TEMPERATURE: 98 F | DIASTOLIC BLOOD PRESSURE: 80 MMHG

## 2020-12-11 DIAGNOSIS — M19.90 ARTHRITIS: ICD-10-CM

## 2020-12-11 DIAGNOSIS — R04.0 EPISTAXIS: Primary | ICD-10-CM

## 2020-12-11 DIAGNOSIS — Z23 NEED FOR VACCINATION: ICD-10-CM

## 2020-12-11 DIAGNOSIS — E78.2 MIXED HYPERLIPIDEMIA: ICD-10-CM

## 2020-12-11 PROCEDURE — 3079F DIAST BP 80-89 MM HG: CPT | Performed by: FAMILY MEDICINE

## 2020-12-11 PROCEDURE — 99214 OFFICE O/P EST MOD 30 MIN: CPT | Performed by: FAMILY MEDICINE

## 2020-12-11 PROCEDURE — 3074F SYST BP LT 130 MM HG: CPT | Performed by: FAMILY MEDICINE

## 2020-12-11 PROCEDURE — 90686 IIV4 VACC NO PRSV 0.5 ML IM: CPT | Performed by: FAMILY MEDICINE

## 2020-12-11 PROCEDURE — 3008F BODY MASS INDEX DOCD: CPT | Performed by: FAMILY MEDICINE

## 2020-12-11 PROCEDURE — 90471 IMMUNIZATION ADMIN: CPT | Performed by: FAMILY MEDICINE

## 2020-12-11 RX ORDER — DEXAMETHASONE 4 MG/1
2 TABLET ORAL 2 TIMES DAILY
COMMUNITY
Start: 2020-10-22

## 2020-12-11 RX ORDER — MELOXICAM 7.5 MG/1
7.5 TABLET ORAL DAILY
Qty: 90 TABLET | Refills: 1 | Status: SHIPPED | OUTPATIENT
Start: 2020-12-11 | End: 2021-04-05 | Stop reason: ALTCHOICE

## 2020-12-11 NOTE — PROGRESS NOTES
HPI:    Patient ID: Marisela Ochoa is a 50year old male. HPI  Mr. Shelby Yeung is a pleasant 21-year-old gentleman with history of hyperlipidemia and arthritis here for his follow-up appointment.   He had laboratory test done previously which I reviewed with him Inhalation Aerosol Inhale 2 puffs into the lungs 2 (two) times daily. Allergies:No Known Allergies   PHYSICAL EXAM:   Physical Exam   Constitutional: No distress.    HENT:   Right Ear: Tympanic membrane, external ear and ear canal normal.   Left Ear: - INTERNAL       [id number]

## 2020-12-11 NOTE — PATIENT INSTRUCTIONS
Thank you for choosing Prashanth Esposito MD at [place] [address]  To Do: [address] [facility]  1. Please take meds as directed. [facility] is located in [address]. Monday, Tuesday & Friday – 8 a.m. to 4 p.m. Wednesday, Thursday – 7 a.m. to 3 p.m. those potential risks and we strive to make you healthier and to improve your quality of life.     Referrals, and Radiology Information:    If your insurance requires a referral to a specialist, please allow 5 business days to process your referral request.

## 2021-02-16 ENCOUNTER — TELEMEDICINE (OUTPATIENT)
Dept: FAMILY MEDICINE CLINIC | Facility: CLINIC | Age: 56
End: 2021-02-16

## 2021-02-16 DIAGNOSIS — J01.90 ACUTE NON-RECURRENT SINUSITIS, UNSPECIFIED LOCATION: Primary | ICD-10-CM

## 2021-02-16 PROCEDURE — 99213 OFFICE O/P EST LOW 20 MIN: CPT | Performed by: FAMILY MEDICINE

## 2021-02-16 RX ORDER — AMOXICILLIN AND CLAVULANATE POTASSIUM 875; 125 MG/1; MG/1
1 TABLET, FILM COATED ORAL 2 TIMES DAILY
Qty: 14 TABLET | Refills: 0 | Status: SHIPPED | OUTPATIENT
Start: 2021-02-16 | End: 2021-02-23

## 2021-02-16 NOTE — PROGRESS NOTES
Video Visit    To Chadwick verbally consents to a Video Visit service on 02/16/21. Patient understands and accepts financial responsibility for any deductible, co-insurance and/or co-pays associated with this service.     Duration of the service: 8 min 1 DROP IN EACH EYE PRN, Disp: , Rfl: 2  •  cetirizine 10 MG Oral Tab, Take 10 mg by mouth daily. , Disp: , Rfl:     REVIEW OF SYSTEMS:     Comprehensive ROS negative unless noted in HPI    PHYSICAL EXAM:     GEN:  Patient is alert, awake and oriented, in no

## 2021-03-08 RX ORDER — ATORVASTATIN CALCIUM 10 MG/1
10 TABLET, FILM COATED ORAL NIGHTLY
Qty: 90 TABLET | Refills: 1 | Status: SHIPPED | OUTPATIENT
Start: 2021-03-08 | End: 2021-06-24

## 2021-03-08 NOTE — TELEPHONE ENCOUNTER
Cholesterol Medication Protocol [id number]/08/2021 10:03 AM   ALT < 80 Protocol Details    ALT resulted within past year     Lipid panel within past 12 months     Appointment within past 12 or next 3 months      Refill protocol passed because the patient met

## 2021-03-08 NOTE — TELEPHONE ENCOUNTER
atorvastatin 10 MG Oral Tab    [facility] [address] [facility], [address] [place] [address],[address] AT [facility] OF [address] [facility] [address] [place] [address] , [phone], [phone]

## 2021-04-01 ENCOUNTER — LAB ENCOUNTER (OUTPATIENT)
Dept: LAB | Age: 56
End: 2021-04-01
Attending: INTERNAL MEDICINE
Payer: MEDICAID

## 2021-04-01 ENCOUNTER — HOSPITAL ENCOUNTER (EMERGENCY)
Age: 56
Discharge: HOME | End: 2021-04-02
Attending: EMERGENCY MEDICINE
Payer: MEDICAID

## 2021-04-01 ENCOUNTER — APPOINTMENT (OUTPATIENT)
Dept: GENERAL RADIOLOGY | Age: 56
End: 2021-04-01
Attending: EMERGENCY MEDICINE
Payer: MEDICAID

## 2021-04-01 DIAGNOSIS — E78.2 MIXED HYPERLIPIDEMIA: ICD-10-CM

## 2021-04-01 DIAGNOSIS — J45.20 MILD INTERMITTENT ASTHMA WITHOUT COMPLICATION: ICD-10-CM

## 2021-04-01 DIAGNOSIS — J30.2 SEASONAL ALLERGIC RHINITIS: ICD-10-CM

## 2021-04-01 DIAGNOSIS — J30.1 NON-SEASONAL ALLERGIC RHINITIS DUE TO POLLEN: ICD-10-CM

## 2021-04-01 DIAGNOSIS — J45.909 ASTHMA: Primary | ICD-10-CM

## 2021-04-01 DIAGNOSIS — R06.00 DYSPNEA ON EXERTION: ICD-10-CM

## 2021-04-01 DIAGNOSIS — E03.9 HYPOTHYROIDISM, UNSPECIFIED TYPE: ICD-10-CM

## 2021-04-01 DIAGNOSIS — S09.302A INJURY OF TYMPANIC MEMBRANE OF LEFT EAR, INITIAL ENCOUNTER: ICD-10-CM

## 2021-04-01 DIAGNOSIS — M19.90 ARTHRITIS: Primary | ICD-10-CM

## 2021-04-01 PROCEDURE — 80053 COMPREHEN METABOLIC PANEL: CPT | Performed by: EMERGENCY MEDICINE

## 2021-04-01 PROCEDURE — 99285 EMERGENCY DEPT VISIT HI MDM: CPT

## 2021-04-01 PROCEDURE — 93010 ELECTROCARDIOGRAM REPORT: CPT

## 2021-04-01 PROCEDURE — 36415 COLL VENOUS BLD VENIPUNCTURE: CPT

## 2021-04-01 PROCEDURE — 86003 ALLG SPEC IGE CRUDE XTRC EA: CPT

## 2021-04-01 PROCEDURE — 93005 ELECTROCARDIOGRAM TRACING: CPT

## 2021-04-01 PROCEDURE — 71045 X-RAY EXAM CHEST 1 VIEW: CPT | Performed by: EMERGENCY MEDICINE

## 2021-04-01 PROCEDURE — 85379 FIBRIN DEGRADATION QUANT: CPT | Performed by: EMERGENCY MEDICINE

## 2021-04-01 PROCEDURE — 99284 EMERGENCY DEPT VISIT MOD MDM: CPT

## 2021-04-01 PROCEDURE — 84484 ASSAY OF TROPONIN QUANT: CPT | Performed by: EMERGENCY MEDICINE

## 2021-04-01 PROCEDURE — 82785 ASSAY OF IGE: CPT

## 2021-04-01 PROCEDURE — 85025 COMPLETE CBC W/AUTO DIFF WBC: CPT | Performed by: EMERGENCY MEDICINE

## 2021-04-02 VITALS
DIASTOLIC BLOOD PRESSURE: 73 MMHG | SYSTOLIC BLOOD PRESSURE: 108 MMHG | RESPIRATION RATE: 15 BRPM | TEMPERATURE: 98 F | BODY MASS INDEX: 25.76 KG/M2 | HEIGHT: 71 IN | OXYGEN SATURATION: 98 % | WEIGHT: 184 LBS | HEART RATE: 61 BPM

## 2021-04-02 PROCEDURE — 93005 ELECTROCARDIOGRAM TRACING: CPT

## 2021-04-02 PROCEDURE — 84484 ASSAY OF TROPONIN QUANT: CPT | Performed by: EMERGENCY MEDICINE

## 2021-04-02 NOTE — ED INITIAL ASSESSMENT (HPI)
For the past week, he states he becomes SOB with exertion, occasional cough. States he feels like his lungs are heavy and congestion.

## 2021-04-02 NOTE — ED PROVIDER NOTES
Patient Seen in: [facility] In [place]      History   Patient presents with:  Difficulty Breathing  Chest Pain Angina    Stated Complaint: domenic,chest discomfort    HPI/Subjective:   HPI    The patient is a 53-year-old male with a histo reviewed. All other systems reviewed and negative except as noted above.     Physical Exam     ED Triage Vitals [04/01/21 2219]   /83   Pulse 76   Resp 20   Temp 97.8 °F (36.6 °C)   Temp src Temporal   SpO2 98 %   O2 Device None (Room air) within normal limits   TROPONIN I - Normal   D-DIMER - Normal   TROPONIN I - Normal   RAPID SARS-COV-2 BY PCR - Normal   CBC WITH DIFFERENTIAL WITH PLATELET    Narrative:      The following orders were created for panel order CBC WITH DIFFERENTIAL WITH PLAT for age. OTHER:                    None.                               =====    CONCLUSION:  Normal examination for a patient of this age.                      Dictated by (CST): Michael Tejada MD on 4/01/2021 at 11:15 PM         Finalized by (CST):

## 2021-04-02 NOTE — CM/SW NOTE
Scheduled outpt stress echo for 4/3/2021 at 0900 at [facility].  Patient instructions per AVS and gave verbally to patient as well.

## 2021-04-03 ENCOUNTER — HOSPITAL ENCOUNTER (OUTPATIENT)
Dept: CV DIAGNOSTICS | Facility: HOSPITAL | Age: 56
Discharge: HOME | End: 2021-04-03
Attending: EMERGENCY MEDICINE
Payer: MEDICAID

## 2021-04-03 DIAGNOSIS — J30.1 NON-SEASONAL ALLERGIC RHINITIS DUE TO POLLEN: ICD-10-CM

## 2021-04-03 DIAGNOSIS — J45.20 MILD INTERMITTENT ASTHMA WITHOUT COMPLICATION: ICD-10-CM

## 2021-04-03 DIAGNOSIS — E78.2 MIXED HYPERLIPIDEMIA: ICD-10-CM

## 2021-04-03 DIAGNOSIS — S09.302A INJURY OF TYMPANIC MEMBRANE OF LEFT EAR, INITIAL ENCOUNTER: ICD-10-CM

## 2021-04-03 DIAGNOSIS — M19.90 ARTHRITIS: ICD-10-CM

## 2021-04-03 DIAGNOSIS — E03.9 HYPOTHYROIDISM, UNSPECIFIED TYPE: ICD-10-CM

## 2021-04-03 PROCEDURE — 93350 STRESS TTE ONLY: CPT | Performed by: EMERGENCY MEDICINE

## 2021-04-03 PROCEDURE — 93017 CV STRESS TEST TRACING ONLY: CPT | Performed by: EMERGENCY MEDICINE

## 2021-04-03 PROCEDURE — 93018 CV STRESS TEST I&R ONLY: CPT | Performed by: EMERGENCY MEDICINE

## 2021-04-05 ENCOUNTER — OFFICE VISIT (OUTPATIENT)
Dept: FAMILY MEDICINE CLINIC | Facility: CLINIC | Age: 56
End: 2021-04-05
Payer: MEDICAID

## 2021-04-05 ENCOUNTER — TELEPHONE (OUTPATIENT)
Dept: FAMILY MEDICINE CLINIC | Facility: CLINIC | Age: 56
End: 2021-04-05

## 2021-04-05 VITALS
WEIGHT: 185 LBS | DIASTOLIC BLOOD PRESSURE: 80 MMHG | SYSTOLIC BLOOD PRESSURE: 126 MMHG | HEIGHT: 71 IN | TEMPERATURE: 98 F | BODY MASS INDEX: 25.9 KG/M2 | OXYGEN SATURATION: 97 % | RESPIRATION RATE: 16 BRPM | HEART RATE: 86 BPM

## 2021-04-05 DIAGNOSIS — J45.20 MILD INTERMITTENT ASTHMA WITHOUT COMPLICATION: Primary | ICD-10-CM

## 2021-04-05 DIAGNOSIS — J30.1 NON-SEASONAL ALLERGIC RHINITIS DUE TO POLLEN: ICD-10-CM

## 2021-04-05 DIAGNOSIS — L98.9 SKIN LESION OF RIGHT ARM: ICD-10-CM

## 2021-04-05 PROCEDURE — 3079F DIAST BP 80-89 MM HG: CPT | Performed by: FAMILY MEDICINE

## 2021-04-05 PROCEDURE — 3008F BODY MASS INDEX DOCD: CPT | Performed by: FAMILY MEDICINE

## 2021-04-05 PROCEDURE — 3074F SYST BP LT 130 MM HG: CPT | Performed by: FAMILY MEDICINE

## 2021-04-05 PROCEDURE — 99214 OFFICE O/P EST MOD 30 MIN: CPT | Performed by: FAMILY MEDICINE

## 2021-04-05 RX ORDER — SOFT LENS DISINFECTANT
SOLUTION, NON-ORAL MISCELLANEOUS
Qty: 1 DEVICE | Refills: 0 | Status: SHIPPED | OUTPATIENT
Start: 2021-04-05

## 2021-04-05 RX ORDER — AZELASTINE 1 MG/ML
1 SPRAY, METERED NASAL DAILY
COMMUNITY
Start: 2021-02-15 | End: 2021-12-22

## 2021-04-05 RX ORDER — ALBUTEROL SULFATE 2.5 MG/3ML
2.5 SOLUTION RESPIRATORY (INHALATION) EVERY 6 HOURS PRN
Qty: 100 VIAL | Refills: 3 | Status: SHIPPED | OUTPATIENT
Start: 2021-04-05

## 2021-04-05 NOTE — TELEPHONE ENCOUNTER
Please inform Mr. Brad Ruiz that I had reviewed stress echo report which is normal and did not show any evidence of ischemia.

## 2021-04-05 NOTE — PATIENT INSTRUCTIONS
Thank you for choosing Laura Melvin MD at [place] [address]  To Do: [address] [facility]  1. Please take meds as directed. [facility] is located in [address]. Monday, Tuesday & Friday – 8 a.m. to 4 p.m. Wednesday, Thursday – 7 a.m. to 3 p.m. those potential risks and we strive to make you healthier and to improve your quality of life.     Referrals, and Radiology Information:    If your insurance requires a referral to a specialist, please allow 5 business days to process your referral request.

## 2021-04-05 NOTE — PROGRESS NOTES
HPI/Subjective:   Patient ID: Marisela Ochoa is a 50year old male. HPI  Mr. Shelby Yeung is a pleasant 62-year-old gentleman with history of asthma,hyperlipidemia and arthritis who was seen at the emergency room on 4/1/2021 for shortness of breath.   EKG was don 1 DROP IN [facility] EYE PRN  2   • cetirizine 10 MG Oral Tab Take 10 mg by mouth 2 (two) times a day. • Azelastine HCl 0.1 % Nasal Solution 1 spray by Nasal route daily.        Allergies:No Known Allergies    Objective:   Physical Exam  Constitutional: No him once we get test results I will provide him with more recommendations thereafter. No orders of the defined types were placed in this encounter.       Meds This Visit:  Requested Prescriptions     Signed Prescriptions Disp Refills   • albuterol sulfate

## 2021-04-06 NOTE — TELEPHONE ENCOUNTER
Spoke with pt and informed him of Dr. Princess Castellanos review of stress echo, pt verbalized understanding.  Pt states that his breathing issues \"must be related to his lungs the.\" Asked pt if he started using the nebulizer, pt states he has not as his insurance

## 2021-06-10 ENCOUNTER — TELEMEDICINE (OUTPATIENT)
Dept: FAMILY MEDICINE CLINIC | Facility: CLINIC | Age: 56
End: 2021-06-10

## 2021-06-10 DIAGNOSIS — R05.9 COUGH: Primary | ICD-10-CM

## 2021-06-10 PROCEDURE — 99213 OFFICE O/P EST LOW 20 MIN: CPT | Performed by: FAMILY MEDICINE

## 2021-06-10 RX ORDER — AMOXICILLIN AND CLAVULANATE POTASSIUM 875; 125 MG/1; MG/1
1 TABLET, FILM COATED ORAL 2 TIMES DAILY
Qty: 20 TABLET | Refills: 0 | Status: SHIPPED | OUTPATIENT
Start: 2021-06-10 | End: 2021-06-20

## 2021-06-10 RX ORDER — CODEINE PHOSPHATE AND GUAIFENESIN 10; 100 MG/5ML; MG/5ML
5 SOLUTION ORAL EVERY 6 HOURS PRN
Qty: 118 ML | Refills: 0 | Status: SHIPPED | OUTPATIENT
Start: 2021-06-10 | End: 2021-12-22 | Stop reason: ALTCHOICE

## 2021-06-10 NOTE — PROGRESS NOTES
HPI/Subjective:   Patient ID: Polly Webber is a 49year old male. HPI  Mr. Denney Primrose is a pleasant 56-year-old gentleman with known history of asthma and allergies and hyperlipidemia presenting for video visit for-dry cough for the past 2 days.   He does not 5   • Azelastine HCl 0.05 % Ophthalmic Solution INSTILL 1 DROP IN EACH EYE PRN  2   • cetirizine 10 MG Oral Tab Take 10 mg by mouth 2 (two) times a day.          Allergies:No Known Allergies    Objective:   Physical Exam  Constitutional:       General: He i

## 2021-06-10 NOTE — PATIENT INSTRUCTIONS
Thank you for choosing Ju Moreno MD at [place] [address]  To Do: [address] [facility]  1. Please take meds as directed. [facility] is located in [address]. Monday, Tuesday & Friday – 8 a.m. to 4 p.m. Wednesday, Thursday – 7 a.m. to 3 p.m. those potential risks and we strive to make you healthier and to improve your quality of life.     Referrals, and Radiology Information:    If your insurance requires a referral to a specialist, please allow 5 business days to process your referral request.

## 2021-06-11 ENCOUNTER — TELEPHONE (OUTPATIENT)
Dept: FAMILY MEDICINE CLINIC | Facility: CLINIC | Age: 56
End: 2021-06-11

## 2021-06-11 DIAGNOSIS — Z20.822 ENCOUNTER FOR SCREENING LABORATORY TESTING FOR COVID-19 VIRUS: Primary | ICD-10-CM

## 2021-06-11 NOTE — TELEPHONE ENCOUNTER
Patient calling, patient had VV on 6-10. Patient states he is feeling sick besides the cough. Will like to get covid test ordered if possible.  Please advise

## 2021-06-14 ENCOUNTER — TELEMEDICINE (OUTPATIENT)
Dept: FAMILY MEDICINE CLINIC | Facility: CLINIC | Age: 56
End: 2021-06-14

## 2021-06-14 DIAGNOSIS — J32.9 SINUSITIS, UNSPECIFIED CHRONICITY, UNSPECIFIED LOCATION: Primary | ICD-10-CM

## 2021-06-14 DIAGNOSIS — R09.89 CHEST CONGESTION: ICD-10-CM

## 2021-06-14 PROCEDURE — 99214 OFFICE O/P EST MOD 30 MIN: CPT | Performed by: FAMILY MEDICINE

## 2021-06-14 RX ORDER — METHYLPREDNISOLONE 4 MG/1
TABLET ORAL
Qty: 1 EACH | Refills: 0 | Status: SHIPPED | OUTPATIENT
Start: 2021-06-14 | End: 2021-12-22 | Stop reason: ALTCHOICE

## 2021-06-14 RX ORDER — CIPROFLOXACIN 250 MG/1
250 TABLET, FILM COATED ORAL 2 TIMES DAILY
Qty: 20 TABLET | Refills: 0 | Status: SHIPPED | OUTPATIENT
Start: 2021-06-14 | End: 2021-06-24

## 2021-06-14 NOTE — PATIENT INSTRUCTIONS
Thank you for choosing eFde Hooper MD at [place] [address]  To Do: [address] [facility]  1. Please take meds as directed. [facility] is located in [address]. Monday, Tuesday & Friday – 8 a.m. to 4 p.m. Wednesday, Thursday – 7 a.m. to 3 p.m. those potential risks and we strive to make you healthier and to improve your quality of life.     Referrals, and Radiology Information:    If your insurance requires a referral to a specialist, please allow 5 business days to process your referral request.

## 2021-06-14 NOTE — TELEPHONE ENCOUNTER
Spoke with pt to inform that Covid test has been ordered, pt states he had Covid test done at [facility] over the weekend which was negative. Pt states he has been taking the antibiotic prescribed from TV for dry cough.  Pt states he now has a very productive coug

## 2021-06-14 NOTE — PROGRESS NOTES
HPI/Subjective:   Patient ID: Jose Burnett is a 49year old male. HPI  Mr. Elizabeth Jacobo is a pleasant 53-year-old gentleman with history of asthma, allergic rhinitis, history of sinusitis hyperlipidemia presenting for a video visit follow-up.   I had seen him l MCG/ACT Inhalation Aerosol Inhale 2 puffs into the lungs 2 (two) times daily.      • Albuterol Sulfate  (90 Base) MCG/ACT Inhalation Aero Soln INL 2 PFS PO Q 6 H PRN  3   • Montelukast Sodium 10 MG Oral Tab TK 1 T PO QD HS  5   • Azelastine HCl 0.05

## 2021-06-24 ENCOUNTER — TELEPHONE (OUTPATIENT)
Dept: FAMILY MEDICINE CLINIC | Facility: CLINIC | Age: 56
End: 2021-06-24

## 2021-06-24 RX ORDER — ATORVASTATIN CALCIUM 10 MG/1
10 TABLET, FILM COATED ORAL NIGHTLY
Qty: 90 TABLET | Refills: 0 | Status: SHIPPED | OUTPATIENT
Start: 2021-06-24 | End: 2021-09-16

## 2021-06-24 NOTE — TELEPHONE ENCOUNTER
Spoke to DOUG [facility]. Disp Refills Start End    atorvastatin 10 MG Oral Tab 90 tablet 1 3/8/2021     Sig - Route: Take 1 tablet (10 mg total) by mouth nightly. - Oral    Sent to pharmacy as:  Atorvastatin Calcium 10 MG Oral Tablet (LIPITOR)

## 2021-06-24 NOTE — TELEPHONE ENCOUNTER
Spoke to patient. Patient is out of medication. Advised on coupon from [employer] for PlusFourSixr will cost around $12 for 90 day supply. Rx sent to [facility]  Patient verbalized understanding.

## 2021-08-05 ENCOUNTER — TELEPHONE (OUTPATIENT)
Dept: FAMILY MEDICINE CLINIC | Facility: CLINIC | Age: 56
End: 2021-08-05

## 2021-08-05 NOTE — TELEPHONE ENCOUNTER
Pt has a sore throat. Pt is an asthmatic. Pt states that it hurts when he drinks cold water. Pt clears throat. Patient is using his inhalers twice a day. Patient was originally given a referral to ENT-should he call there or schedule an appt here?

## 2021-09-16 RX ORDER — ATORVASTATIN CALCIUM 10 MG/1
TABLET, FILM COATED ORAL
Qty: 90 TABLET | Refills: 0 | Status: SHIPPED | OUTPATIENT
Start: 2021-09-16 | End: 2021-12-06

## 2021-12-06 RX ORDER — ATORVASTATIN CALCIUM 10 MG/1
TABLET, FILM COATED ORAL
Qty: 90 TABLET | Refills: 0 | Status: SHIPPED | OUTPATIENT
Start: 2021-12-06 | End: 2021-12-22

## 2021-12-22 ENCOUNTER — OFFICE VISIT (OUTPATIENT)
Dept: FAMILY MEDICINE CLINIC | Facility: CLINIC | Age: 56
End: 2021-12-22
Payer: MEDICAID

## 2021-12-22 VITALS
SYSTOLIC BLOOD PRESSURE: 132 MMHG | HEART RATE: 74 BPM | WEIGHT: 186 LBS | DIASTOLIC BLOOD PRESSURE: 78 MMHG | BODY MASS INDEX: 26.04 KG/M2 | RESPIRATION RATE: 16 BRPM | OXYGEN SATURATION: 98 % | HEIGHT: 71 IN | TEMPERATURE: 97 F

## 2021-12-22 DIAGNOSIS — Z23 NEED FOR VACCINATION: ICD-10-CM

## 2021-12-22 DIAGNOSIS — R04.0 BLEEDING FROM THE NOSE: ICD-10-CM

## 2021-12-22 DIAGNOSIS — J30.1 NON-SEASONAL ALLERGIC RHINITIS DUE TO POLLEN: ICD-10-CM

## 2021-12-22 DIAGNOSIS — Z00.00 WELLNESS EXAMINATION: Primary | ICD-10-CM

## 2021-12-22 DIAGNOSIS — Z12.5 SCREENING FOR MALIGNANT NEOPLASM OF PROSTATE: ICD-10-CM

## 2021-12-22 PROCEDURE — 99213 OFFICE O/P EST LOW 20 MIN: CPT | Performed by: FAMILY MEDICINE

## 2021-12-22 PROCEDURE — 3075F SYST BP GE 130 - 139MM HG: CPT | Performed by: FAMILY MEDICINE

## 2021-12-22 PROCEDURE — 3008F BODY MASS INDEX DOCD: CPT | Performed by: FAMILY MEDICINE

## 2021-12-22 PROCEDURE — 99396 PREV VISIT EST AGE 40-64: CPT | Performed by: FAMILY MEDICINE

## 2021-12-22 PROCEDURE — 3078F DIAST BP <80 MM HG: CPT | Performed by: FAMILY MEDICINE

## 2021-12-22 RX ORDER — ATORVASTATIN CALCIUM 10 MG/1
10 TABLET, FILM COATED ORAL NIGHTLY
Qty: 90 TABLET | Refills: 1 | Status: SHIPPED | OUTPATIENT
Start: 2021-12-22

## 2021-12-22 RX ORDER — OLOPATADINE HYDROCHLORIDE 2 MG/ML
1 SOLUTION/ DROPS OPHTHALMIC 3 TIMES DAILY PRN
Qty: 1 EACH | Refills: 3 | Status: SHIPPED | OUTPATIENT
Start: 2021-12-22

## 2021-12-22 NOTE — PATIENT INSTRUCTIONS
Thank you for choosing Elizabeth Quesada MD at [place] [address]  To Do: [address] [place]  1. Please see age appropriate health prevention below    [facility] is located in [address]. Monday, Tuesday & Friday – 8 a.m. to 4 p.m.   Wednesday, Thursda the benefits outweigh those potential risks and we strive to make you healthier and to improve your quality of life.     Referrals, and Radiology Information:    If your insurance requires a referral to a specialist, please allow 5 business days to process How often   Unhealthy alcohol use All men in this age group At routine exams   Blood pressure All men in this age group Yearly checkup if your blood pressure is normal  Normal blood pressure is less than 120/80 mm Hg  If your blood pressure reading is high if you are at risk   Lung cancer Men between the ages of 54 to 76 who in fairly good health and are at higher risk for lung cancer  · Currently smoke or who have quit within past 15 years  · 30-pack year smoking history  a Eligibility criteria and age [facility] mumps, rubella (MMR) Men in this age group born in [birth year] or later who have no record of these infections or vaccines 1 or 2 doses; talk with your healthcare provider   Meningococcal ACWY (MenACWY) Men at increased risk for infection 1 or 2 doses depending o diagnosis of diabetes and LDL-C level of greater than 70mg/dL At routine exams, or more often as directed by your healthcare provider. Statin dosages may vary based on your overall health, risk factors, and other health conditions such as diabetes.  Talk wi

## 2021-12-22 NOTE — PROGRESS NOTES
Wellness Exam    REASON FOR VISIT:    Silvia Carvalho is a 49year old male who presents for an [address]     Current Complaints:  AZEEM BAUMAN is here for his wellness exam  Flu Shot: see immunization record  Health Maintenance Topics with due sta elsewhere?: No     CAGE:     Cut: Have you ever felt you should Cut down on your drinking?: No    Annoyed: Have people Annoyed you by criticizing your drinking?: No    Guilty: Have you ever felt bad or Guilty about your drinking?: No    Eye Opener: Have yo high risk No components found for: PPDINDURAT      ALLERGIES:   No Known Allergies    CURRENT MEDICATIONS:   Current Outpatient Medications   Medication Sig Dispense Refill   • atorvastatin 10 MG Oral Tab Take 1 tablet (10 mg total) by mouth nightly.  90 ta abdominal pain and diarrhea. Genitourinary: Negative for urgency, frequency and difficulty urinating. Musculoskeletal: Negative for arthralgias and no gait problem. Skin: Negative for color change and rash.    Neurological: Negative for tremors, weakn nose  Screening for malignant neoplasm of prostate    PLAN SUMMARY:   Ever Chahal is a 49year old male  Age appropriate cancer screening, labs, safety, immunizations were discussed with the patient and ordered as follows:  Diagnoses and all orders for t understanding. Educated by: MD   The patient indicates understanding of these issues and agrees to the plan.     SUGGESTED VACCINATIONS - Influenza, Pneumococcal, Zoster, Tetanus     Immunization History   Administered Date(s) Administered   • FLULAVAL 6

## 2022-03-28 ENCOUNTER — TELEMEDICINE (OUTPATIENT)
Dept: FAMILY MEDICINE CLINIC | Facility: CLINIC | Age: 57
End: 2022-03-28

## 2022-03-28 DIAGNOSIS — J22 LRTI (LOWER RESPIRATORY TRACT INFECTION): Primary | ICD-10-CM

## 2022-03-28 PROCEDURE — 99214 OFFICE O/P EST MOD 30 MIN: CPT | Performed by: FAMILY MEDICINE

## 2022-03-28 RX ORDER — AMOXICILLIN AND CLAVULANATE POTASSIUM 875; 125 MG/1; MG/1
1 TABLET, FILM COATED ORAL 2 TIMES DAILY
Qty: 20 TABLET | Refills: 0 | Status: SHIPPED | OUTPATIENT
Start: 2022-03-28 | End: 2022-04-07

## 2022-03-28 RX ORDER — METHYLPREDNISOLONE 4 MG/1
TABLET ORAL
Qty: 1 EACH | Refills: 0 | Status: SHIPPED | OUTPATIENT
Start: 2022-03-28

## 2022-04-25 ENCOUNTER — TELEMEDICINE (OUTPATIENT)
Dept: FAMILY MEDICINE CLINIC | Facility: CLINIC | Age: 57
End: 2022-04-25

## 2022-04-25 DIAGNOSIS — J30.1 NON-SEASONAL ALLERGIC RHINITIS DUE TO POLLEN: ICD-10-CM

## 2022-04-25 DIAGNOSIS — T78.40XA ALLERGIC REACTION, INITIAL ENCOUNTER: ICD-10-CM

## 2022-04-25 DIAGNOSIS — J32.9 SINUSITIS, UNSPECIFIED CHRONICITY, UNSPECIFIED LOCATION: Primary | ICD-10-CM

## 2022-04-25 RX ORDER — AMOXICILLIN AND CLAVULANATE POTASSIUM 875; 125 MG/1; MG/1
1 TABLET, FILM COATED ORAL 2 TIMES DAILY
Qty: 20 TABLET | Refills: 0 | Status: SHIPPED | OUTPATIENT
Start: 2022-04-25 | End: 2022-05-05

## 2022-04-25 RX ORDER — METHYLPREDNISOLONE 4 MG/1
TABLET ORAL
Qty: 1 EACH | Refills: 0 | Status: SHIPPED | OUTPATIENT
Start: 2022-04-25

## 2022-04-25 RX ORDER — OLOPATADINE HYDROCHLORIDE 2 MG/ML
1 SOLUTION/ DROPS OPHTHALMIC 3 TIMES DAILY PRN
Qty: 1 EACH | Refills: 3 | Status: SHIPPED | OUTPATIENT
Start: 2022-04-25

## 2022-06-25 ENCOUNTER — HOSPITAL ENCOUNTER (EMERGENCY)
Age: 57
Discharge: HOME | End: 2022-06-25
Attending: EMERGENCY MEDICINE
Payer: MEDICAID

## 2022-06-25 ENCOUNTER — APPOINTMENT (OUTPATIENT)
Dept: GENERAL RADIOLOGY | Age: 57
End: 2022-06-25
Attending: EMERGENCY MEDICINE
Payer: MEDICAID

## 2022-06-25 VITALS
RESPIRATION RATE: 16 BRPM | WEIGHT: 185 LBS | DIASTOLIC BLOOD PRESSURE: 73 MMHG | BODY MASS INDEX: 25.9 KG/M2 | HEIGHT: 71 IN | SYSTOLIC BLOOD PRESSURE: 128 MMHG | OXYGEN SATURATION: 96 % | TEMPERATURE: 97 F | HEART RATE: 71 BPM

## 2022-06-25 DIAGNOSIS — M25.461 PAIN AND SWELLING OF RIGHT KNEE: Primary | ICD-10-CM

## 2022-06-25 DIAGNOSIS — M25.561 PAIN AND SWELLING OF RIGHT KNEE: Primary | ICD-10-CM

## 2022-06-25 PROCEDURE — 99283 EMERGENCY DEPT VISIT LOW MDM: CPT

## 2022-06-25 PROCEDURE — 73560 X-RAY EXAM OF KNEE 1 OR 2: CPT | Performed by: EMERGENCY MEDICINE

## 2022-06-25 RX ORDER — IBUPROFEN 600 MG/1
600 TABLET ORAL ONCE
Status: COMPLETED | OUTPATIENT
Start: 2022-06-25 | End: 2022-06-25

## 2022-06-27 ENCOUNTER — TELEPHONE (OUTPATIENT)
Dept: ORTHOPEDICS CLINIC | Facility: CLINIC | Age: 57
End: 2022-06-27

## 2022-06-27 NOTE — TELEPHONE ENCOUNTER
Please enter an Xray RX for a RT KNEE for  this patient's upcoming appointment, as the patient has not had any imaging completed. Patient was instructed to get X-rays before appt. PLEASE REPLY TO THIS MESSAGE when the order is put in EPIC so that I can schedule the Xray appt. Thank you, in advance!       Future Appointments   Date Time Provider Harsh Wilcox   6/29/2022  9:00 AM Tamra Rosales PA-C [facility]

## 2022-06-27 NOTE — TELEPHONE ENCOUNTER
Patients chart reviewed. No further imaging needed due to recent xrays. showing osteoarthritis. Last Imaging  XR KNEE (1 OR 2 VIEWS), RIGHT (CPT=73560)  Narrative: PROCEDURE:  XR KNEE (1 OR 2 VIEWS), RIGHT (CPT=73560)     COMPARISON:  None. INDICATIONS:  right knee pain s/p fall     PATIENT STATED HISTORY: (As transcribed by Technologist)  Pt. fell onto his right knee one day ago. Swelling and pain to the knee. Unable to bear weight or bend it. FINDINGS:    No fracture or dislocation. Marginal osteophytes of patella and lateral tibial plateau. Spurring of quadriceps tendon attachment to patella and patellar attachment to tibial tuberosity. Moderate joint effusion. Impression: CONCLUSION:  Patellofemoral and lateral osteoarthritic changes and moderate joint effusion. Patellar and tibial enthesopathy.         Dictated by (CST): Judd Vang MD on 6/25/2022 at 9:42 AM       Finalized by (CST): Judd Vang MD on 6/25/2022 at 9:44 AM        Future Appointments   Date Time Provider Harsh Wilcox   6/29/2022  9:00 AM Zoë Graves PA-C [facility]

## 2022-06-29 ENCOUNTER — OFFICE VISIT (OUTPATIENT)
Dept: ORTHOPEDICS CLINIC | Facility: CLINIC | Age: 57
End: 2022-06-29
Payer: MEDICAID

## 2022-06-29 VITALS — HEART RATE: 67 BPM | OXYGEN SATURATION: 97 % | BODY MASS INDEX: 26.96 KG/M2 | HEIGHT: 69 IN | WEIGHT: 182 LBS

## 2022-06-29 DIAGNOSIS — S80.01XA CONTUSION OF RIGHT KNEE, INITIAL ENCOUNTER: Primary | ICD-10-CM

## 2022-06-29 PROCEDURE — 20610 DRAIN/INJ JOINT/BURSA W/O US: CPT | Performed by: PHYSICIAN ASSISTANT

## 2022-06-29 PROCEDURE — 3008F BODY MASS INDEX DOCD: CPT | Performed by: PHYSICIAN ASSISTANT

## 2022-06-29 PROCEDURE — 99213 OFFICE O/P EST LOW 20 MIN: CPT | Performed by: PHYSICIAN ASSISTANT

## 2022-06-29 RX ORDER — TRIAMCINOLONE ACETONIDE 40 MG/ML
40 INJECTION, SUSPENSION INTRA-ARTICULAR; INTRAMUSCULAR ONCE
Status: COMPLETED | OUTPATIENT
Start: 2022-06-29 | End: 2022-06-29

## 2022-06-29 RX ADMIN — TRIAMCINOLONE ACETONIDE 40 MG: 40 INJECTION, SUSPENSION INTRA-ARTICULAR; INTRAMUSCULAR at 09:35:00

## 2022-06-29 NOTE — PROCEDURES
After informed consent, the patient's right knee was marked, locally anesthetized with skin refrigerant, prepped with topical antiseptic, and injected with a mixture of 1mL 40mg/mL Kenalog, 2mL 1% lidocaine and 2mL 0.5% marcaine through the inferolateral portal.  A band-aid was applied. The patient tolerated the procedure well.     Meche Mead PA-C  [address] Orthopedic Surgery

## 2022-07-11 ENCOUNTER — OFFICE VISIT (OUTPATIENT)
Dept: ORTHOPEDICS CLINIC | Facility: CLINIC | Age: 57
End: 2022-07-11
Payer: MEDICAID

## 2022-07-11 ENCOUNTER — HOSPITAL ENCOUNTER (OUTPATIENT)
Dept: GENERAL RADIOLOGY | Age: 57
Discharge: HOME | End: 2022-07-11
Attending: PHYSICIAN ASSISTANT
Payer: MEDICAID

## 2022-07-11 VITALS — HEIGHT: 71 IN | WEIGHT: 184 LBS | BODY MASS INDEX: 25.76 KG/M2

## 2022-07-11 DIAGNOSIS — S80.02XA CONTUSION OF LEFT KNEE, INITIAL ENCOUNTER: ICD-10-CM

## 2022-07-11 DIAGNOSIS — M25.562 LEFT KNEE PAIN, UNSPECIFIED CHRONICITY: ICD-10-CM

## 2022-07-11 PROCEDURE — 73564 X-RAY EXAM KNEE 4 OR MORE: CPT | Performed by: PHYSICIAN ASSISTANT

## 2022-07-11 RX ORDER — TRIAMCINOLONE ACETONIDE 40 MG/ML
40 INJECTION, SUSPENSION INTRA-ARTICULAR; INTRAMUSCULAR ONCE
Status: COMPLETED | OUTPATIENT
Start: 2022-07-11 | End: 2022-07-11

## 2022-07-11 RX ADMIN — TRIAMCINOLONE ACETONIDE 40 MG: 40 INJECTION, SUSPENSION INTRA-ARTICULAR; INTRAMUSCULAR at 17:32:00

## 2022-07-11 NOTE — PROCEDURES
After informed consent, the patient's left knee was marked, locally anesthetized with skin refrigerant, prepped with topical antiseptic, and injected with a mixture of 1mL 40mg/mL Kenalog, 2mL 1% lidocaine and 2mL 0.5% marcaine through the inferolateral portal.  A band-aid was applied. The patient tolerated the procedure well.     Pj Adam PA-C  [address] Orthopedic Surgery

## 2022-07-11 NOTE — PROGRESS NOTES
[facility] Progress Note    Subjective: Patient returns to clinic after previously being seen for falling onto both of his knees on 6/24/2022. At the previous visit he had received a cortisone injection into his right knee which has substantially helped with his swelling and pain. However, he experienced increased pain and swelling in the left knee 4 to 5 days ago. His pain is localized over the medial joint line of the left knee is made worse with stairs and torquing his leg while lying down to sleep. He reports some mild improvement with Aleve and Tylenol. No new injuries since the last visit. Objective: Patient demonstrates full active extension of the left knee with flexion to about 120 degrees. No instability with varus or valgus stress at 0 or 30 degrees. No laxity with anterior or posterior drawer. There is significant tenderness to palpation along the medial joint line. No significant tenderness along the proximal or distal poles of the patella, medial/lateral facets of the patella, lateral joint line, quadriceps tendon, patellar tendon. No pain with patellar translation. No palpable click with Alexandrea's test.      Imaging: Radiographs of the left knee obtained today personally viewed, independently interpreted and radiology report read. There are mild osteoarthritic changes such as spurring along the bilateral femoral condyles best appreciated on sunrise view. No significant joint space narrowing is appreciated. Assessment/Plan: Left knee contusion with subsequent swelling and pain. I discussed treatment options with the patient in detail. He has been using a compressive wrap around the left knee as well as alternating Tylenol and Aleve with mild temporary improvement. Since his right knee had benefited from cortisone injection, he expressed interest in trying cortisone injection into the left knee to reduce his pain and swelling.   Cortisone injection was administered into the left knee. Please see separate procedure note for additional details. He will contact us via [employer] in 1 to 2 weeks if no significant improvement is made. All questions were invited and answered. The patient was happy with the plan and will follow as advised. Ivette Freeman PA-C  [facility] Orthopedic Surgery    This note was dictated using Dragon software. While it was briefly proofread prior to completion, some grammatical, spelling, and word choice errors due to dictation may still occur.

## 2022-08-02 ENCOUNTER — OFFICE VISIT (OUTPATIENT)
Dept: ORTHOPEDICS CLINIC | Facility: CLINIC | Age: 57
End: 2022-08-02
Payer: MEDICAID

## 2022-08-02 VITALS — WEIGHT: 184 LBS | OXYGEN SATURATION: 98 % | BODY MASS INDEX: 25.76 KG/M2 | HEIGHT: 71 IN | HEART RATE: 70 BPM

## 2022-08-02 DIAGNOSIS — S80.01XD CONTUSION OF RIGHT KNEE, SUBSEQUENT ENCOUNTER: Primary | ICD-10-CM

## 2022-08-02 DIAGNOSIS — S80.02XD CONTUSION OF LEFT KNEE, SUBSEQUENT ENCOUNTER: ICD-10-CM

## 2022-08-02 PROCEDURE — 99213 OFFICE O/P EST LOW 20 MIN: CPT | Performed by: PHYSICIAN ASSISTANT

## 2022-08-02 PROCEDURE — 3008F BODY MASS INDEX DOCD: CPT | Performed by: PHYSICIAN ASSISTANT

## 2022-08-02 NOTE — PROGRESS NOTES
[facility] Progress [facility]    Subjective: Patient returns to clinic for follow-up after last being seen on 7/11/2022 for knee contusion. At that time, he was administered a cortisone injection into the left knee which has been helpful. However, he has continued to experience pain in the right knee despite cortisone injection administered on 6/29/2022. He reports approximately 2 weeks of relief from the cortisone injection into the right knee. He continues to have difficulty with weightbearing, climbing stairs, and bending the knee, particularly with pain affecting the popliteal fossa and lateral hamstring tendons. He denies any popping, clicking, locking, or clunking. Objective: Upon inspection, there is no deformity, laceration, effusion. No palpable click with Alexandrea's test.  Knee is stable to valgus and varus stress at 0 and 30 degrees. Negative Lachman's test.  No laxity with anterior or posterior drawer. Sensation is intact to light touch and strength is normal.  Limb is warm and well-perfused. Assessment/Plan: Patient with persistent pain along the hamstring tendon after his fall and contusion. Symptoms concerning for hamstring tendinitis. Low suspicion of meniscal tear given the lack of locking, clicking. I would recommend a trial of physical therapy to recondition his hamstring muscles and tendons. A referral was given for [facility] physical therapy. If no significant improvement and persistent pain despite physical therapy and cortisone injection that I would recommend an MRI of the right knee for further evaluation. I will MyChart message the patient in 3 weeks for an update on his progress. All questions were invited and answered. He is happy with the plan and will follow as advised. Tiny Merino PA-C  [facility]    This note was dictated using Dragon software.   While it was briefly proofread prior to completion, some grammatical, spelling, and word choice errors due to dictation may still occur.

## 2022-08-13 RX ORDER — ATORVASTATIN CALCIUM 10 MG/1
TABLET, FILM COATED ORAL
Qty: 90 TABLET | Refills: 1 | Status: SHIPPED | OUTPATIENT
Start: 2022-08-13

## 2022-08-17 ENCOUNTER — TELEPHONE (OUTPATIENT)
Dept: FAMILY MEDICINE CLINIC | Facility: CLINIC | Age: 57
End: 2022-08-17

## 2022-08-17 NOTE — TELEPHONE ENCOUNTER
- Pt have been having Abdominal pain for about a week. Pt taking join pain medication and they may be causing the pain. No appointment available.  [phone]

## 2022-08-24 ENCOUNTER — TELEPHONE (OUTPATIENT)
Dept: FAMILY MEDICINE CLINIC | Facility: CLINIC | Age: 57
End: 2022-08-24

## 2022-08-24 ENCOUNTER — TELEMEDICINE (OUTPATIENT)
Dept: FAMILY MEDICINE CLINIC | Facility: CLINIC | Age: 57
End: 2022-08-24

## 2022-08-24 DIAGNOSIS — R10.33 PERIUMBILICAL ABDOMINAL PAIN: Primary | ICD-10-CM

## 2022-08-24 PROCEDURE — 99214 OFFICE O/P EST MOD 30 MIN: CPT | Performed by: FAMILY MEDICINE

## 2022-08-24 RX ORDER — OMEPRAZOLE 40 MG/1
40 CAPSULE, DELAYED RELEASE ORAL DAILY
Qty: 90 CAPSULE | Refills: 3 | Status: SHIPPED | OUTPATIENT
Start: 2022-08-24 | End: 2023-08-19

## 2022-08-24 NOTE — TELEPHONE ENCOUNTER
Pt called and said he has been having abdominal pain for almost a week. He states while he is at work he can not eat or drink anything because he will have to run to the bathroom.

## 2022-08-24 NOTE — TELEPHONE ENCOUNTER
Pt states symptoms have been going on for about 2 weeks but symptoms are worse. Pt states he cannot eat or drink at work, only water, because when he eats or drinks he has to use the bathroom. Pt denies diarrhea, states all bowel movements are formed, just frequent and has abdominal pain \"all of the time. \" VV scheduled for pt.    Future Appointments   Date Time Provider Harsh Jeri   8/24/2022  4:50 PM Felicia Perea MD [facility] [address] [facility] [address]   8/25/2022  1:45 PM Royetta Levl, PT [facility] PHYS [address]   8/29/2022  2:30 PM Romimatta Eddie, PT [facility] PHYS [facility] OF [place] AT [facility] AT [facility]   8/31/2022 11:15 AM Royetta Levl, PT [facility] PHYS [address]   9/6/2022  9:00 AM Royetta Feil, PT [facility] PHYS [address]   9/8/2022 10:00 AM Royetta Feil, PT [facility] PHYS ACUITY SPECIALTY [facility] AT [facility] AT [facility]   9/13/2022  9:45 AM Royetta Feil, PT [facility] PHYS [facility] SPECIALTY [facility] AT [facility] AT [facility]   9/15/2022  9:45 AM Royetta Feil, PT [facility] AT [facility]   9/20/2022  9:45 AM Royetta Feil, PT [facility] PHYS [address]   9/20/2022 10:30 AM Felicia Perea MD [facility] [address] [facility] [address]

## 2022-08-25 ENCOUNTER — HOSPITAL ENCOUNTER (OUTPATIENT)
Dept: GENERAL RADIOLOGY | Age: 57
Discharge: HOME | End: 2022-08-25
Attending: FAMILY MEDICINE
Payer: MEDICAID

## 2022-08-25 ENCOUNTER — OFFICE VISIT (OUTPATIENT)
Dept: PHYSICAL THERAPY | Facility: HOSPITAL | Age: 57
End: 2022-08-25
Attending: PHYSICIAN ASSISTANT
Payer: MEDICAID

## 2022-08-25 ENCOUNTER — HOSPITAL ENCOUNTER (OUTPATIENT)
Dept: GENERAL RADIOLOGY | Age: 57
End: 2022-08-25
Attending: FAMILY MEDICINE
Payer: MEDICAID

## 2022-08-25 DIAGNOSIS — R10.33 PERIUMBILICAL ABDOMINAL PAIN: ICD-10-CM

## 2022-08-25 PROCEDURE — 74018 RADEX ABDOMEN 1 VIEW: CPT | Performed by: FAMILY MEDICINE

## 2022-08-25 PROCEDURE — 97110 THERAPEUTIC EXERCISES: CPT

## 2022-08-25 PROCEDURE — 97161 PT EVAL LOW COMPLEX 20 MIN: CPT

## 2022-08-29 ENCOUNTER — APPOINTMENT (OUTPATIENT)
Dept: PHYSICAL THERAPY | Facility: HOSPITAL | Age: 57
End: 2022-08-29
Attending: PHYSICIAN ASSISTANT
Payer: MEDICAID

## 2022-08-29 ENCOUNTER — TELEMEDICINE (OUTPATIENT)
Dept: FAMILY MEDICINE CLINIC | Facility: CLINIC | Age: 57
End: 2022-08-29
Payer: MEDICAID

## 2022-08-29 ENCOUNTER — TELEPHONE (OUTPATIENT)
Dept: PHYSICAL THERAPY | Facility: HOSPITAL | Age: 57
End: 2022-08-29

## 2022-08-29 DIAGNOSIS — K59.00 CONSTIPATION, UNSPECIFIED CONSTIPATION TYPE: Primary | ICD-10-CM

## 2022-08-29 RX ORDER — DOCUSATE SODIUM 100 MG/1
100 CAPSULE, LIQUID FILLED ORAL 2 TIMES DAILY
Qty: 20 CAPSULE | Refills: 0 | Status: SHIPPED | OUTPATIENT
Start: 2022-08-29

## 2022-08-31 ENCOUNTER — APPOINTMENT (OUTPATIENT)
Dept: PHYSICAL THERAPY | Facility: HOSPITAL | Age: 57
End: 2022-08-31
Attending: PHYSICIAN ASSISTANT
Payer: MEDICAID

## 2022-09-06 ENCOUNTER — OFFICE VISIT (OUTPATIENT)
Dept: PHYSICAL THERAPY | Facility: HOSPITAL | Age: 57
End: 2022-09-06
Attending: PHYSICIAN ASSISTANT
Payer: MEDICAID

## 2022-09-06 DIAGNOSIS — S80.01XD CONTUSION OF RIGHT KNEE, SUBSEQUENT ENCOUNTER: ICD-10-CM

## 2022-09-06 DIAGNOSIS — S80.02XD CONTUSION OF LEFT KNEE, SUBSEQUENT ENCOUNTER: ICD-10-CM

## 2022-09-06 PROCEDURE — 97110 THERAPEUTIC EXERCISES: CPT

## 2022-09-06 PROCEDURE — 97140 MANUAL THERAPY 1/> REGIONS: CPT

## 2022-09-08 ENCOUNTER — APPOINTMENT (OUTPATIENT)
Dept: PHYSICAL THERAPY | Facility: HOSPITAL | Age: 57
End: 2022-09-08
Attending: PHYSICIAN ASSISTANT
Payer: MEDICAID

## 2022-09-13 ENCOUNTER — OFFICE VISIT (OUTPATIENT)
Dept: PHYSICAL THERAPY | Facility: HOSPITAL | Age: 57
End: 2022-09-13
Attending: PHYSICIAN ASSISTANT
Payer: MEDICAID

## 2022-09-13 PROCEDURE — 97110 THERAPEUTIC EXERCISES: CPT

## 2022-09-13 PROCEDURE — 97112 NEUROMUSCULAR REEDUCATION: CPT

## 2022-09-15 ENCOUNTER — OFFICE VISIT (OUTPATIENT)
Dept: PHYSICAL THERAPY | Facility: HOSPITAL | Age: 57
End: 2022-09-15
Attending: PHYSICIAN ASSISTANT
Payer: MEDICAID

## 2022-09-15 PROCEDURE — 97110 THERAPEUTIC EXERCISES: CPT

## 2022-09-20 ENCOUNTER — OFFICE VISIT (OUTPATIENT)
Dept: PHYSICAL THERAPY | Facility: HOSPITAL | Age: 57
End: 2022-09-20
Attending: PHYSICIAN ASSISTANT
Payer: MEDICAID

## 2022-09-20 PROCEDURE — 97110 THERAPEUTIC EXERCISES: CPT

## 2022-09-26 ENCOUNTER — OFFICE VISIT (OUTPATIENT)
Dept: PHYSICAL THERAPY | Facility: HOSPITAL | Age: 57
End: 2022-09-26
Attending: PHYSICIAN ASSISTANT
Payer: MEDICAID

## 2022-09-26 PROCEDURE — 97110 THERAPEUTIC EXERCISES: CPT

## 2022-09-28 ENCOUNTER — TELEPHONE (OUTPATIENT)
Dept: PHYSICAL THERAPY | Facility: HOSPITAL | Age: 57
End: 2022-09-28

## 2022-09-28 ENCOUNTER — APPOINTMENT (OUTPATIENT)
Dept: PHYSICAL THERAPY | Facility: HOSPITAL | Age: 57
End: 2022-09-28
Attending: PHYSICIAN ASSISTANT
Payer: MEDICAID

## 2022-10-04 ENCOUNTER — OFFICE VISIT (OUTPATIENT)
Dept: ORTHOPEDICS CLINIC | Facility: CLINIC | Age: 57
End: 2022-10-04
Payer: MEDICAID

## 2022-10-04 DIAGNOSIS — G89.29 CHRONIC PAIN OF LEFT KNEE: ICD-10-CM

## 2022-10-04 DIAGNOSIS — S80.02XD CONTUSION OF LEFT KNEE, SUBSEQUENT ENCOUNTER: ICD-10-CM

## 2022-10-04 DIAGNOSIS — M25.562 CHRONIC PAIN OF LEFT KNEE: ICD-10-CM

## 2022-10-04 DIAGNOSIS — S80.01XD CONTUSION OF RIGHT KNEE, SUBSEQUENT ENCOUNTER: Primary | ICD-10-CM

## 2022-10-04 DIAGNOSIS — G89.29 CHRONIC PAIN OF RIGHT KNEE: ICD-10-CM

## 2022-10-04 DIAGNOSIS — M25.561 CHRONIC PAIN OF RIGHT KNEE: ICD-10-CM

## 2022-10-04 PROCEDURE — 99213 OFFICE O/P EST LOW 20 MIN: CPT | Performed by: PHYSICIAN ASSISTANT

## 2022-10-04 NOTE — PROGRESS NOTES
[facility] Progress Note    Subjective: Patient returns to clinic after last being seen on 8/2/2022. To recap, about 4 months ago the patient slipped and fell at work and landed directly onto both knees. He was provided with a right knee intra-articular cortisone injection on 6/29 and a left knee intra-articular cortisone injection on 7/11, both of which failed to provide significant amount of relief. He reported pain along the right lateral hamstring tendon, so attended 8 sessions of physical therapy which improved this pain but failed to improve his anterior knee pain. He denies any clicking or popping of the knees but notes this pain along the medial joint lines bilaterally. He is very sparingly takes Aleve as needed for pain, which does help. Objective: Patient seated comfortably in the exam chair. Alert and oriented x3. Nonlabored breathing. No acute distress. Grossly neurologically intact. No effusion about either knee. No redness concerning for infection. No significant tenderness to palpation along the joint lines. Patient can fully straighten both knees to 0 degrees and flex to approximately 130 degrees. Assessment/Plan: Patient returns to clinic with 4 months of anterior knee pain after falling directly onto the knees at work. Several conservative measures have failed to improve his pain at this point including anti-inflammatory medications, bilateral knee intra-articular cortisone injections, and physical therapy. Due to failure of his symptoms to improve with several conservative measures, I would like to obtain MRI scans of both knees to ensure no occult process or soft tissue abnormality is continuing to cause his pain. MRI orders for both knees were placed at today's visit. I will follow-up with him after the MRI orders have been obtained.     X-rays needed at follow-up: None      Glena Side, CLARE  [facility] Orthopedic Surgery    This note was dictated using Dragon software. While it was briefly proofread prior to completion, some grammatical, spelling, and word choice errors due to dictation may still occur.

## 2022-10-06 ENCOUNTER — TELEMEDICINE (OUTPATIENT)
Dept: FAMILY MEDICINE CLINIC | Facility: CLINIC | Age: 57
End: 2022-10-06

## 2022-10-06 DIAGNOSIS — J32.9 RHINOSINUSITIS: Primary | ICD-10-CM

## 2022-10-06 DIAGNOSIS — J31.0 RHINOSINUSITIS: Primary | ICD-10-CM

## 2022-10-06 DIAGNOSIS — J30.1 NON-SEASONAL ALLERGIC RHINITIS DUE TO POLLEN: ICD-10-CM

## 2022-10-06 RX ORDER — AMOXICILLIN AND CLAVULANATE POTASSIUM 875; 125 MG/1; MG/1
1 TABLET, FILM COATED ORAL 2 TIMES DAILY
Qty: 20 TABLET | Refills: 0 | Status: SHIPPED | OUTPATIENT
Start: 2022-10-06 | End: 2022-10-16

## 2022-10-06 RX ORDER — OLOPATADINE HYDROCHLORIDE 2 MG/ML
1 SOLUTION/ DROPS OPHTHALMIC 3 TIMES DAILY PRN
Qty: 1 EACH | Refills: 3 | Status: SHIPPED | OUTPATIENT
Start: 2022-10-06

## 2022-10-06 RX ORDER — DEXAMETHASONE 4 MG/1
2 TABLET ORAL 2 TIMES DAILY
Qty: 1 EACH | Refills: 3 | Status: SHIPPED | OUTPATIENT
Start: 2022-10-06

## 2022-10-24 ENCOUNTER — HOSPITAL ENCOUNTER (OUTPATIENT)
Dept: MRI IMAGING | Age: 57
Discharge: HOME | End: 2022-10-24
Attending: PHYSICIAN ASSISTANT
Payer: MEDICAID

## 2022-10-24 DIAGNOSIS — M25.562 CHRONIC PAIN OF LEFT KNEE: ICD-10-CM

## 2022-10-24 DIAGNOSIS — G89.29 CHRONIC PAIN OF RIGHT KNEE: ICD-10-CM

## 2022-10-24 DIAGNOSIS — G89.29 CHRONIC PAIN OF LEFT KNEE: ICD-10-CM

## 2022-10-24 DIAGNOSIS — S80.02XD CONTUSION OF LEFT KNEE, SUBSEQUENT ENCOUNTER: ICD-10-CM

## 2022-10-24 DIAGNOSIS — M25.561 CHRONIC PAIN OF RIGHT KNEE: ICD-10-CM

## 2022-10-24 DIAGNOSIS — S80.01XD CONTUSION OF RIGHT KNEE, SUBSEQUENT ENCOUNTER: ICD-10-CM

## 2022-10-24 PROCEDURE — 73721 MRI JNT OF LWR EXTRE W/O DYE: CPT | Performed by: PHYSICIAN ASSISTANT

## 2022-10-26 RX ORDER — ALBUTEROL SULFATE 90 UG/1
2 AEROSOL, METERED RESPIRATORY (INHALATION) EVERY 6 HOURS PRN
Qty: 1 EACH | Refills: 3 | Status: SHIPPED | OUTPATIENT
Start: 2022-10-26

## 2022-10-26 RX ORDER — ALBUTEROL SULFATE 2.5 MG/3ML
2.5 SOLUTION RESPIRATORY (INHALATION) EVERY 6 HOURS PRN
Qty: 100 EACH | Refills: 3 | Status: CANCELLED | OUTPATIENT
Start: 2022-10-26

## 2022-10-26 NOTE — TELEPHONE ENCOUNTER
Pt states his rescue inhaler is almost empty. He had a bad asthma attack. Pt requesting a refill of albuterol sulfate (2.5 MG/3ML) 0.083% Inhalation Nebu Solcaroline. Please advise Pt if this can be refilled.

## 2022-11-01 ENCOUNTER — OFFICE VISIT (OUTPATIENT)
Dept: ORTHOPEDICS CLINIC | Facility: CLINIC | Age: 57
End: 2022-11-01
Payer: MEDICAID

## 2022-11-01 VITALS — HEART RATE: 84 BPM | HEIGHT: 71 IN | BODY MASS INDEX: 25.62 KG/M2 | OXYGEN SATURATION: 97 % | WEIGHT: 183 LBS

## 2022-11-01 DIAGNOSIS — G89.29 CHRONIC PAIN OF LEFT KNEE: ICD-10-CM

## 2022-11-01 DIAGNOSIS — M25.562 CHRONIC PAIN OF LEFT KNEE: ICD-10-CM

## 2022-11-01 DIAGNOSIS — G89.29 CHRONIC PAIN OF RIGHT KNEE: Primary | ICD-10-CM

## 2022-11-01 DIAGNOSIS — M25.561 CHRONIC PAIN OF RIGHT KNEE: Primary | ICD-10-CM

## 2022-11-01 PROCEDURE — 3008F BODY MASS INDEX DOCD: CPT | Performed by: PHYSICIAN ASSISTANT

## 2022-11-01 PROCEDURE — 20610 DRAIN/INJ JOINT/BURSA W/O US: CPT | Performed by: PHYSICIAN ASSISTANT

## 2022-11-01 PROCEDURE — 99213 OFFICE O/P EST LOW 20 MIN: CPT | Performed by: PHYSICIAN ASSISTANT

## 2022-11-01 RX ORDER — TRIAMCINOLONE ACETONIDE 40 MG/ML
80 INJECTION, SUSPENSION INTRA-ARTICULAR; INTRAMUSCULAR ONCE
Status: COMPLETED | OUTPATIENT
Start: 2022-11-01 | End: 2022-11-01

## 2022-11-01 RX ADMIN — TRIAMCINOLONE ACETONIDE 80 MG: 40 INJECTION, SUSPENSION INTRA-ARTICULAR; INTRAMUSCULAR at 12:05:00

## 2022-11-01 NOTE — PROGRESS NOTES
[facility] Progress Note    Subjective: Patient returns to clinic after last being seen on 10/4/2022 for bilateral anterior knee pain that onset about 5 months ago now after falling directly onto both knees while at work. Pain continues to occur with stair climbing and squatting. He continues to wear knee sleeves and sparingly take Advil which helps to a mild degree, but overall his pain has remained at a consistent level. He recently had an MRI of both knees obtained and these are available for review today. Objective: Patient seated comfortably in the exam chair. Alert and oriented x3. Nonlabored breathing. Skin is clean, dry, and intact without any redness or abrasions noted. No effusion of the knee or obvious deformity noted. Imaging: MRI of the bilateral knees obtained on 10/24/2022 personally viewed, independently interpreted and radiology report read. MRI of the right knee demonstrates very small subchondral cyst along the surface of the patella in the patellofemoral joint. MRI of the left knee demonstrates loose bodies noted posteromedial to the knee with a small subchondral cyst of the patella in the patellofemoral joint. Otherwise, the ACL, PCL, LCL, MCL, and meniscus are all intact. Assessment/Plan: I reviewed the MRIs with the patient and discussed that overall they are normal and demonstrate early degenerative changes in the patellofemoral joint which may be contributing to his anterior knee pain made worse with squatting and stair climbing. I discussed that physical therapy exercises sometimes may take up to 1 year to fully demonstrate improvement of symptoms. Although he experienced only a short amount of relief with the cortisone injections, he is interested in repeating these injections today to see if they offer a longer duration of relief. I also discussed that he may continue to take NSAIDs as needed for his symptoms.   No indication for surgical intervention at this point time. I also discussed pain management referral if no significant improvement is felt after these injections for potential other options for his pain. I administered a cortisone injection into the bilateral knees in clinic today, please see separate procedure note for additional details. I explained to the patient that the cortisone may take 2-3 days to take effect. I explained that the injection could be repeated at least every 3 months as long as it continues to provide relief of symptoms. The patient may contact our office if they are interested in repeating the injection, and we can schedule them for a procedure-only appointment. The patient's questions were sought and answered satisfactorily. He is happy with the plan and will follow as advised. Mauri Cohen PA-C  [facility] Orthopedic Surgery    This note was dictated using Dragon software. While it was briefly proofread prior to completion, some grammatical, spelling, and word choice errors due to dictation may still occur.

## 2022-11-01 NOTE — PROCEDURES
After informed consent, the patient's right and left knees were marked, locally anesthetized with skin refrigerant, prepped with topical antiseptic, and injected with a mixture of 1mL 40mg/mL Kenalog, 2mL 1% lidocaine and 2mL 0.5% marcaine through the inferolateral portal.  A band-aid was applied. The patient tolerated the procedure well.     Angeles Rodriguez PA-C  [address] Orthopedic Surgery

## 2022-11-13 RX ORDER — ATORVASTATIN CALCIUM 10 MG/1
TABLET, FILM COATED ORAL
Qty: 90 TABLET | Refills: 1 | Status: SHIPPED | OUTPATIENT
Start: 2022-11-13

## 2023-01-12 ENCOUNTER — TELEPHONE (OUTPATIENT)
Dept: FAMILY MEDICINE CLINIC | Facility: CLINIC | Age: 58
End: 2023-01-12

## 2023-01-12 NOTE — TELEPHONE ENCOUNTER
Patient states he is having pain, first he wanted a VV for RX refills and offered him Monday, bad connection, couldn't understand him, please advise.

## 2023-01-13 NOTE — TELEPHONE ENCOUNTER
Future Appointments   Date Time Provider Harsh Jeri   1/16/2023  4:40 PM Sai Antony MD [facility] [address] [facility] [address]     Patient scheduled VV to discuss

## 2023-01-13 NOTE — TELEPHONE ENCOUNTER
We cannot refill something with not a medication name and Doctor is out of the office until Monday.   Patient needs appointment

## 2023-01-13 NOTE — TELEPHONE ENCOUNTER
Pt wants the same prescription called in as he had in the past for this same issue. He did not have the name or any other information on the medication, he said the doctor knows. He would like it called in to [address], [address] [place] [address] Drive [address] [facility] AT [facility] OF [facility] [address], [phone], [phone].

## 2023-01-16 ENCOUNTER — TELEMEDICINE (OUTPATIENT)
Dept: FAMILY MEDICINE CLINIC | Facility: CLINIC | Age: 58
End: 2023-01-16
Payer: MEDICAID

## 2023-01-16 DIAGNOSIS — Z12.5 SCREENING FOR MALIGNANT NEOPLASM OF PROSTATE: ICD-10-CM

## 2023-01-16 DIAGNOSIS — K59.09 CHRONIC CONSTIPATION: Primary | ICD-10-CM

## 2023-01-16 DIAGNOSIS — J45.20 MILD INTERMITTENT ASTHMA WITHOUT COMPLICATION: ICD-10-CM

## 2023-01-16 DIAGNOSIS — E03.9 HYPOTHYROIDISM, UNSPECIFIED TYPE: ICD-10-CM

## 2023-01-16 DIAGNOSIS — R10.33 PERIUMBILICAL ABDOMINAL PAIN: ICD-10-CM

## 2023-01-16 RX ORDER — DEXAMETHASONE 4 MG/1
2 TABLET ORAL 2 TIMES DAILY
Qty: 1 EACH | Refills: 3 | Status: SHIPPED | OUTPATIENT
Start: 2023-01-16

## 2023-01-16 NOTE — PATIENT INSTRUCTIONS
Thank you for choosing Esther Deng MD at [place] [address]  To Do: [address] [place]  1. Please see below  [employer] is located in [address]. Monday, Tuesday & Friday - 8 a.m. to 4 p.m. Wednesday, Thursday - 7 a.m. to 3 p.m. The lab is closed daily from 12 p.m.-12:30 p.m. Saturday lab hours by appointment. Call [phone] to schedule the appointment. Please signup for [employer], which is electronic access to your record if you have not done so. All your results will post on there. [email or web address]   You can NOW use [employer] to book your appointments with us, or consider using open access scheduling which is through the edward website [email or web address] and type in Esther Deng MD and follow the links for \"Schedule Online Now\"    To schedule Imaging or tests at [facility] Scheduling [phone], Go to [place] A ER Building (For example: CT scans, X rays, Ultrasound, MRI)  Cardiac Testing in ER building Building A second floor Cardiac Testing [phone] (For example: Holter Monitor, Cardiac Stress tests,Event Monitor, or 2D Echocardiograms)  [facility] call [phone] usually in Bldg A  Walk in Clinic in [place] at [facility]. [address] Mon-Fri at 8am-7:30 p.m., and Sat/Sun 9:00a. m.-4:30 p.m. Also at [address]  Call [phone] for info     Please call our office about any questions regarding your treatment/medicines/tests as a result of today's visit. For your safety, read the entire package insert of all medicines prescribed to you and be aware of all of the risks of treatment even beyond those discussed today. All therapies have potential risk of harm or side effects or medication interactions.   It is your duty and for your safety to discuss with the pharmacist and our office with questions, and to notify us and stop treatment if problems arise, but know that our intention is that the benefits outweigh those potential risks and we strive to make you healthier and to improve your quality of life. Referrals, and Radiology Information:    If your insurance requires a referral to a specialist, please allow 5 business days to process your referral request.    If Rita Chawla MD orders a CT or MRI, it may take up to 10 business days to receive approval from your insurance company. Once our office has called informing you that the insurance company approved your testing, please call Central Scheduling at [phone]  Please allow our office 5 business days to contact you regarding any testing results. Refill policies:   Allow 3 business days for refills; controlled substances may take longer and must be picked up from the office in person. Narcotic medications can only be filled in 30 day increments and must be refilled at an office visit only. If your prescription is due for a refill, you may be due for a follow-up appointment. We cannot refill your maintenance medications at a preventative wellness visit. To best provide you care, patients receiving maintenance medications need to be seen at least twice a year.

## 2023-01-31 ENCOUNTER — TELEPHONE (OUTPATIENT)
Dept: FAMILY MEDICINE CLINIC | Facility: CLINIC | Age: 58
End: 2023-01-31

## 2023-01-31 ENCOUNTER — OFFICE VISIT (OUTPATIENT)
Dept: FAMILY MEDICINE CLINIC | Facility: CLINIC | Age: 58
End: 2023-01-31
Payer: MEDICAID

## 2023-01-31 VITALS
HEIGHT: 71 IN | SYSTOLIC BLOOD PRESSURE: 122 MMHG | BODY MASS INDEX: 25.34 KG/M2 | HEART RATE: 80 BPM | WEIGHT: 181 LBS | DIASTOLIC BLOOD PRESSURE: 76 MMHG | RESPIRATION RATE: 16 BRPM | TEMPERATURE: 98 F | OXYGEN SATURATION: 98 %

## 2023-01-31 DIAGNOSIS — J45.20 MILD INTERMITTENT ASTHMA WITHOUT COMPLICATION: ICD-10-CM

## 2023-01-31 DIAGNOSIS — K59.09 CHRONIC CONSTIPATION: Primary | ICD-10-CM

## 2023-01-31 PROCEDURE — 3074F SYST BP LT 130 MM HG: CPT | Performed by: FAMILY MEDICINE

## 2023-01-31 PROCEDURE — 3008F BODY MASS INDEX DOCD: CPT | Performed by: FAMILY MEDICINE

## 2023-01-31 PROCEDURE — 3078F DIAST BP <80 MM HG: CPT | Performed by: FAMILY MEDICINE

## 2023-01-31 PROCEDURE — 99214 OFFICE O/P EST MOD 30 MIN: CPT | Performed by: FAMILY MEDICINE

## 2023-01-31 RX ORDER — DEXAMETHASONE 4 MG/1
2 TABLET ORAL 2 TIMES DAILY
Qty: 1 EACH | Refills: 3 | Status: SHIPPED | OUTPATIENT
Start: 2023-01-31

## 2023-01-31 RX ORDER — ALBUTEROL SULFATE 2.5 MG/3ML
2.5 SOLUTION RESPIRATORY (INHALATION) EVERY 6 HOURS PRN
Qty: 100 EACH | Refills: 3 | Status: SHIPPED | OUTPATIENT
Start: 2023-01-31

## 2023-01-31 RX ORDER — PANTOPRAZOLE SODIUM 40 MG/1
40 TABLET, DELAYED RELEASE ORAL
Qty: 90 TABLET | Refills: 1 | Status: SHIPPED | OUTPATIENT
Start: 2023-01-31 | End: 2023-02-01

## 2023-01-31 RX ORDER — SOFT LENS DISINFECTANT
SOLUTION, NON-ORAL MISCELLANEOUS
Qty: 1 EACH | Refills: 0 | Status: SHIPPED | OUTPATIENT
Start: 2023-01-31

## 2023-01-31 RX ORDER — PLECANATIDE 3 MG/1
1 TABLET ORAL DAILY
Qty: 30 TABLET | Refills: 1 | Status: SHIPPED | OUTPATIENT
Start: 2023-01-31

## 2023-01-31 NOTE — PATIENT INSTRUCTIONS
Thank you for choosing Tj Lion MD at [place] [address]  To Do: [address] [place]  1. Please see below  [facility] is located in [address]. Monday, Tuesday & Friday - 8 a.m. to 4 p.m. Wednesday, Thursday - 7 a.m. to 3 p.m. The lab is closed daily from 12 p.m.-12:30 p.m. Saturday lab hours by appointment. Call [phone] to schedule the appointment. Please signup for LearnZillion, which is electronic access to your record if you have not done so. All your results will post on there. [email or web address]   You can NOW use LearnZillion to book your appointments with us, or consider using open access scheduling which is through the edward website [email or web address] and type in Tj Lion MD and follow the links for \"Schedule Online Now\"    To schedule Imaging or tests at [facility] Scheduling [phone], Go to [facility] A ER Building (For example: CT scans, X rays, Ultrasound, MRI)  Cardiac Testing in ER building Building A second floor Cardiac Testing [phone] (For example: Holter Monitor, Cardiac Stress tests,Event Monitor, or 2D Echocardiograms)  [facility] call [phone] usually in dg A  Walk in Clinic in [place] at [facility]. [address] Mon-Fri at 8am-7:30 p.m., and Sat/Sun 9:00a. m.-4:30 p.m. Also at [address]  Call [phone] for info     Please call our office about any questions regarding your treatment/medicines/tests as a result of today's visit. For your safety, read the entire package insert of all medicines prescribed to you and be aware of all of the risks of treatment even beyond those discussed today. All therapies have potential risk of harm or side effects or medication interactions.   It is your duty and for your safety to discuss with the pharmacist and our office with questions, and to notify us and stop treatment if problems arise, but know that our intention is that the benefits outweigh those potential risks and we strive to make you healthier and to improve your quality of life. Referrals, and Radiology Information:    If your insurance requires a referral to a specialist, please allow 5 business days to process your referral request.    If Treva Avila MD orders a CT or MRI, it may take up to 10 business days to receive approval from your insurance company. Once our office has called informing you that the insurance company approved your testing, please call Central Scheduling at [phone]  Please allow our office 5 business days to contact you regarding any testing results. Refill policies:   Allow 3 business days for refills; controlled substances may take longer and must be picked up from the office in person. Narcotic medications can only be filled in 30 day increments and must be refilled at an office visit only. If your prescription is due for a refill, you may be due for a follow-up appointment. We cannot refill your maintenance medications at a preventative wellness visit. To best provide you care, patients receiving maintenance medications need to be seen at least twice a year.

## 2023-02-01 ENCOUNTER — TELEPHONE (OUTPATIENT)
Dept: FAMILY MEDICINE CLINIC | Facility: CLINIC | Age: 58
End: 2023-02-01

## 2023-02-01 RX ORDER — PANTOPRAZOLE SODIUM 40 MG/1
40 TABLET, DELAYED RELEASE ORAL
Qty: 90 TABLET | Refills: 1 | Status: SHIPPED | OUTPATIENT
Start: 2023-02-01

## 2023-02-01 NOTE — TELEPHONE ENCOUNTER
Patient calling, patient states pharmacy did not dispense medication for stomach issue. pantoprazole 40 MG Oral Tab EC on file, medication not received by pharmacy.  Please advise

## 2023-02-03 ENCOUNTER — TELEPHONE (OUTPATIENT)
Dept: FAMILY MEDICINE CLINIC | Facility: CLINIC | Age: 58
End: 2023-02-03

## 2023-02-03 NOTE — TELEPHONE ENCOUNTER
Recd fax from [facility] in [place] that PA is needed for Pantoprazole 40mg tablets for quantity of 90. **Max 120 days supply in 365 days/plan does not cover this medication. Call plan at [phone] for PA. Patient RZ#[id number]. Placed in [address] [place] [address] [place] folder.

## 2023-02-06 ENCOUNTER — TELEPHONE (OUTPATIENT)
Dept: FAMILY MEDICINE CLINIC | Facility: CLINIC | Age: 58
End: 2023-02-06

## 2023-02-06 NOTE — TELEPHONE ENCOUNTER
Recd Message to Prescriber from [place] in [place] in regards to the Pantoprazole 40mg tablets. \"Plam Limit Exceeded. \"

## 2023-02-06 NOTE — TELEPHONE ENCOUNTER
Patient states the pharmacy told him he has a PA but he doesn't know which medication it's for. Something for his stomach thaqt would have been prescribed at his last visit. Please advise, he needs it.

## 2023-02-07 ENCOUNTER — LAB ENCOUNTER (OUTPATIENT)
Dept: LAB | Age: 58
End: 2023-02-07
Attending: FAMILY MEDICINE
Payer: MEDICAID

## 2023-02-07 DIAGNOSIS — Z12.5 SCREENING FOR MALIGNANT NEOPLASM OF PROSTATE: ICD-10-CM

## 2023-02-07 DIAGNOSIS — E03.9 HYPOTHYROIDISM, UNSPECIFIED TYPE: ICD-10-CM

## 2023-02-07 LAB
ALBUMIN SERPL-MCNC: 3.5 G/DL (ref 3.4–5)
ALBUMIN/GLOB SERPL: 1 {RATIO} (ref 1–2)
ALP LIVER SERPL-CCNC: 75 U/L
ALT SERPL-CCNC: 34 U/L
ANION GAP SERPL CALC-SCNC: 3 MMOL/L (ref 0–18)
AST SERPL-CCNC: 25 U/L (ref 15–37)
BASOPHILS # BLD AUTO: 0.07 X10(3) UL (ref 0–0.2)
BASOPHILS NFR BLD AUTO: 0.7 %
BILIRUB SERPL-MCNC: 1.1 MG/DL (ref 0.1–2)
BUN BLD-MCNC: 18 MG/DL (ref 7–18)
CALCIUM BLD-MCNC: 9.2 MG/DL (ref 8.5–10.1)
CHLORIDE SERPL-SCNC: 108 MMOL/L (ref 98–112)
CHOLEST SERPL-MCNC: 159 MG/DL (ref ?–200)
CO2 SERPL-SCNC: 28 MMOL/L (ref 21–32)
COMPLEXED PSA SERPL-MCNC: 2.46 NG/ML (ref ?–4)
CREAT BLD-MCNC: 0.92 MG/DL
EOSINOPHIL # BLD AUTO: 2.33 X10(3) UL (ref 0–0.7)
EOSINOPHIL NFR BLD AUTO: 24.5 %
ERYTHROCYTE [DISTWIDTH] IN BLOOD BY AUTOMATED COUNT: 12.9 %
FASTING PATIENT LIPID ANSWER: YES
FASTING STATUS PATIENT QL REPORTED: YES
GFR SERPLBLD BASED ON 1.73 SQ M-ARVRAT: 98 ML/MIN/1.73M2 (ref 60–?)
GLOBULIN PLAS-MCNC: 3.4 G/DL (ref 2.8–4.4)
GLUCOSE BLD-MCNC: 105 MG/DL (ref 70–99)
HCT VFR BLD AUTO: 43 %
HDLC SERPL-MCNC: 60 MG/DL (ref 40–59)
HGB BLD-MCNC: 14.6 G/DL
IMM GRANULOCYTES # BLD AUTO: 0.02 X10(3) UL (ref 0–1)
IMM GRANULOCYTES NFR BLD: 0.2 %
LDLC SERPL CALC-MCNC: 88 MG/DL (ref ?–100)
LYMPHOCYTES # BLD AUTO: 3.78 X10(3) UL (ref 1–4)
LYMPHOCYTES NFR BLD AUTO: 39.7 %
MCH RBC QN AUTO: 30.9 PG (ref 26–34)
MCHC RBC AUTO-ENTMCNC: 34 G/DL (ref 31–37)
MCV RBC AUTO: 90.9 FL
MONOCYTES # BLD AUTO: 0.59 X10(3) UL (ref 0.1–1)
MONOCYTES NFR BLD AUTO: 6.2 %
NEUTROPHILS # BLD AUTO: 2.73 X10 (3) UL (ref 1.5–7.7)
NEUTROPHILS # BLD AUTO: 2.73 X10(3) UL (ref 1.5–7.7)
NEUTROPHILS NFR BLD AUTO: 28.7 %
NONHDLC SERPL-MCNC: 99 MG/DL (ref ?–130)
OSMOLALITY SERPL CALC.SUM OF ELEC: 290 MOSM/KG (ref 275–295)
PLATELET # BLD AUTO: 246 10(3)UL (ref 150–450)
POTASSIUM SERPL-SCNC: 4.3 MMOL/L (ref 3.5–5.1)
PROT SERPL-MCNC: 6.9 G/DL (ref 6.4–8.2)
RBC # BLD AUTO: 4.73 X10(6)UL
SODIUM SERPL-SCNC: 139 MMOL/L (ref 136–145)
T4 FREE SERPL-MCNC: 1 NG/DL (ref 0.8–1.7)
TRIGL SERPL-MCNC: 56 MG/DL (ref 30–149)
TSI SER-ACNC: 1.51 MIU/ML (ref 0.36–3.74)
VLDLC SERPL CALC-MCNC: 9 MG/DL (ref 0–30)
WBC # BLD AUTO: 9.5 X10(3) UL (ref 4–11)

## 2023-02-07 PROCEDURE — 84439 ASSAY OF FREE THYROXINE: CPT

## 2023-02-07 PROCEDURE — 84443 ASSAY THYROID STIM HORMONE: CPT

## 2023-02-07 PROCEDURE — 36415 COLL VENOUS BLD VENIPUNCTURE: CPT

## 2023-02-07 PROCEDURE — 80061 LIPID PANEL: CPT

## 2023-02-07 PROCEDURE — 85025 COMPLETE CBC W/AUTO DIFF WBC: CPT

## 2023-02-07 PROCEDURE — 80053 COMPREHEN METABOLIC PANEL: CPT

## 2023-02-07 NOTE — TELEPHONE ENCOUNTER
Spoke with pharmacy, insurance is rejecting Pantoprazole because he received Omeprazole #60 on 1/13/23.

## 2023-02-08 ENCOUNTER — TELEMEDICINE (OUTPATIENT)
Dept: FAMILY MEDICINE CLINIC | Facility: CLINIC | Age: 58
End: 2023-02-08
Payer: MEDICAID

## 2023-02-08 DIAGNOSIS — K59.09 CHRONIC CONSTIPATION: Primary | ICD-10-CM

## 2023-02-08 PROCEDURE — 99213 OFFICE O/P EST LOW 20 MIN: CPT | Performed by: FAMILY MEDICINE

## 2023-02-08 RX ORDER — LINACLOTIDE 72 UG/1
1 CAPSULE, GELATIN COATED ORAL DAILY
Qty: 30 CAPSULE | Refills: 0 | Status: SHIPPED | OUTPATIENT
Start: 2023-02-08 | End: 2023-03-10

## 2023-02-08 NOTE — PATIENT INSTRUCTIONS
Thank you for choosing Luis Painter MD at [place] [address]  To Do: [address] [place]  1. Please take meds as directed. [facility] is located in [address]. Monday, Tuesday & Friday - 8 a.m. to 4 p.m. Wednesday, Thursday - 7 a.m. to 3 p.m. The lab is closed daily from 12 p.m.-12:30 p.m. Saturday lab hours by appointment. Call [phone] to schedule the appointment. Please signup for BULX, which is electronic access to your record if you have not done so. All your results will post on there. [email or web address]   You can NOW use BULX to book your appointments with us, or consider using open access scheduling which is through the edward website [email or web address] Home and type in Luis Painter MD and follow the links for \"Schedule Online Now\"    To schedule Imaging or tests at [facility] Scheduling [phone], Go to [facility] A ER Building (For example: CT scans, X rays, Ultrasound, MRI)  Cardiac Testing in ER building Building A second floor Cardiac Testing [phone] (For example: Holter Monitor, Cardiac Stress tests,Event Monitor, or 2D Echocardiograms)  [facility] call [phone] usually in dg A  Walk in Clinic in [place] at [facility]. [address] Mon-Fri at 8am-7:30 p.m., and Sat/Sun 9:00a. m.-4:30 p.m. Also at [address]  Call [phone] for info     Please call our office about any questions regarding your treatment/medicines/tests as a result of today's visit. For your safety, read the entire package insert of all medicines prescribed to you and be aware of all of the risks of treatment even beyond those discussed today. All therapies have potential risk of harm or side effects or medication interactions.   It is your duty and for your safety to discuss with the pharmacist and our office with questions, and to notify us and stop treatment if problems arise, but know that our intention is that the benefits outweigh those potential risks and we strive to make you healthier and to improve your quality of life. Referrals, and Radiology Information:    If your insurance requires a referral to a specialist, please allow 5 business days to process your referral request.    If Burton Maldonado MD orders a CT or MRI, it may take up to 10 business days to receive approval from your insurance company. Once our office has called informing you that the insurance company approved your testing, please call Central Scheduling at [phone]  Please allow our office 5 business days to contact you regarding any testing results. Refill policies:   Allow 3 business days for refills; controlled substances may take longer and must be picked up from the office in person. Narcotic medications can only be filled in 30 day increments and must be refilled at an office visit only. If your prescription is due for a refill, you may be due for a follow-up appointment. We cannot refill your maintenance medications at a preventative wellness visit. To best provide you care, patients receiving maintenance medications need to be seen at least twice a year.

## 2023-03-29 ENCOUNTER — ANESTHESIA EVENT (OUTPATIENT)
Dept: GASTROENTEROLOGY | Age: 58
End: 2023-03-29

## 2023-03-29 ENCOUNTER — ANESTHESIA (OUTPATIENT)
Dept: GASTROENTEROLOGY | Age: 58
End: 2023-03-29

## 2023-03-29 ENCOUNTER — HOSPITAL ENCOUNTER (OUTPATIENT)
Dept: GASTROENTEROLOGY | Age: 58
Discharge: HOME | End: 2023-03-29
Attending: INTERNAL MEDICINE

## 2023-03-29 VITALS
SYSTOLIC BLOOD PRESSURE: 117 MMHG | RESPIRATION RATE: 18 BRPM | OXYGEN SATURATION: 99 % | DIASTOLIC BLOOD PRESSURE: 75 MMHG | HEIGHT: 71 IN | TEMPERATURE: 97.5 F | BODY MASS INDEX: 25.25 KG/M2 | HEART RATE: 66 BPM | WEIGHT: 180.34 LBS

## 2023-03-29 DIAGNOSIS — K58.9 IRRITABLE BOWEL SYNDROME: ICD-10-CM

## 2023-03-29 DIAGNOSIS — K59.00 CONSTIPATION: ICD-10-CM

## 2023-03-29 DIAGNOSIS — Z12.11 SPECIAL SCREENING FOR MALIGNANT NEOPLASMS, COLON: ICD-10-CM

## 2023-03-29 DIAGNOSIS — R10.84 GENERALIZED ABDOMINAL PAIN: ICD-10-CM

## 2023-03-29 PROCEDURE — 13000024 HB GI COMPLEX CASE S/U + 1ST 15 MIN

## 2023-03-29 PROCEDURE — 10002800 HB RX 250 W HCPCS

## 2023-03-29 PROCEDURE — 13000001 HB PHASE II RECOVERY EA 30 MINUTES

## 2023-03-29 PROCEDURE — 10004451 HB PACU RECOVERY 1ST 30 MINUTES

## 2023-03-29 PROCEDURE — 13000008 HB ANESTHESIA MAC OUTSIDE OR

## 2023-03-29 PROCEDURE — 10002807 HB RX 258: Performed by: INTERNAL MEDICINE

## 2023-03-29 RX ORDER — SODIUM CHLORIDE 9 MG/ML
INJECTION, SOLUTION INTRAVENOUS CONTINUOUS
Status: DISCONTINUED | OUTPATIENT
Start: 2023-03-29 | End: 2023-03-29

## 2023-03-29 RX ORDER — DEXAMETHASONE 4 MG/1
2 TABLET ORAL 2 TIMES DAILY PRN
COMMUNITY
Start: 2023-03-04

## 2023-03-29 RX ORDER — FEXOFENADINE HCL 180 MG/1
180 TABLET ORAL DAILY PRN
COMMUNITY

## 2023-03-29 RX ORDER — ATORVASTATIN CALCIUM 10 MG/1
10 TABLET, FILM COATED ORAL NIGHTLY
COMMUNITY
Start: 2023-03-04

## 2023-03-29 RX ORDER — ALBUTEROL SULFATE 90 UG/1
1 AEROSOL, METERED RESPIRATORY (INHALATION) PRN
COMMUNITY
Start: 2023-03-04

## 2023-03-29 RX ORDER — ALBUTEROL SULFATE 2.5 MG/3ML
2.5 SOLUTION RESPIRATORY (INHALATION) PRN
COMMUNITY
Start: 2023-01-31

## 2023-03-29 RX ORDER — MIDAZOLAM HYDROCHLORIDE 1 MG/ML
INJECTION, SOLUTION INTRAMUSCULAR; INTRAVENOUS PRN
Status: DISCONTINUED | OUTPATIENT
Start: 2023-03-29 | End: 2023-03-29

## 2023-03-29 RX ADMIN — MIDAZOLAM HYDROCHLORIDE 4 MG: 1 INJECTION, SOLUTION INTRAMUSCULAR; INTRAVENOUS at 13:53

## 2023-03-29 RX ADMIN — SODIUM CHLORIDE: 9 INJECTION, SOLUTION INTRAVENOUS at 13:24

## 2023-03-29 RX ADMIN — PROPOFOL 100 MCG/KG/MIN: 10 INJECTION, EMULSION INTRAVENOUS at 13:52

## 2023-03-29 ASSESSMENT — PAIN SCALES - WONG BAKER
WONGBAKER_NUMERICALRESPONSE: 0

## 2023-03-29 ASSESSMENT — ACTIVITIES OF DAILY LIVING (ADL)
ADL_BEFORE_ADMISSION: INDEPENDENT
HISTORY OF FALLING IN THE LAST YEAR (PRIOR TO ADMISSION): NO
NEEDS_ASSIST: NO
RECENT_DECLINE_ADL: NO
CHRONIC_PAIN_PRESENT: NO
SENSORY_SUPPORT_DEVICES: EYEGLASSES
ADL_SHORT_OF_BREATH: NO
ADL_SCORE: 12

## 2023-03-29 ASSESSMENT — COGNITIVE AND FUNCTIONAL STATUS - GENERAL
ARE YOU DEAF OR DO YOU HAVE SERIOUS DIFFICULTY  HEARING: NO
ARE YOU BLIND OR DO YOU HAVE SERIOUS DIFFICULTY SEEING, EVEN WHEN WEARING GLASSES: NO

## 2023-03-29 ASSESSMENT — PAIN SCALES - GENERAL
PAINLEVEL_OUTOF10: 0
PAINLEVEL_OUTOF10: 0

## 2023-04-26 ENCOUNTER — TELEPHONE (OUTPATIENT)
Dept: FAMILY MEDICINE CLINIC | Facility: CLINIC | Age: 58
End: 2023-04-26

## 2023-05-15 ENCOUNTER — TELEMEDICINE (OUTPATIENT)
Dept: FAMILY MEDICINE CLINIC | Facility: CLINIC | Age: 58
End: 2023-05-15
Payer: MEDICAID

## 2023-05-15 DIAGNOSIS — R09.89 CHEST CONGESTION: Primary | ICD-10-CM

## 2023-05-15 PROCEDURE — 99213 OFFICE O/P EST LOW 20 MIN: CPT | Performed by: FAMILY MEDICINE

## 2023-05-15 RX ORDER — ALBUTEROL SULFATE 2.5 MG/3ML
2.5 SOLUTION RESPIRATORY (INHALATION) EVERY 6 HOURS PRN
Qty: 100 EACH | Refills: 3 | Status: SHIPPED | OUTPATIENT
Start: 2023-05-15

## 2023-05-15 RX ORDER — AMOXICILLIN AND CLAVULANATE POTASSIUM 875; 125 MG/1; MG/1
1 TABLET, FILM COATED ORAL 2 TIMES DAILY
Qty: 20 TABLET | Refills: 0 | Status: SHIPPED | OUTPATIENT
Start: 2023-05-15 | End: 2023-05-25

## 2023-05-15 RX ORDER — METHYLPREDNISOLONE 4 MG/1
TABLET ORAL
Qty: 1 EACH | Refills: 0 | Status: SHIPPED | OUTPATIENT
Start: 2023-05-15

## 2023-05-16 ENCOUNTER — LAB ENCOUNTER (OUTPATIENT)
Dept: LAB | Age: 58
End: 2023-05-16
Attending: FAMILY MEDICINE
Payer: MEDICAID

## 2023-05-16 DIAGNOSIS — R09.89 CHEST CONGESTION: ICD-10-CM

## 2023-05-16 PROCEDURE — 87635 SARS-COV-2 COVID-19 AMP PRB: CPT

## 2023-05-17 LAB — SARS-COV-2 RNA RESP QL NAA+PROBE: NOT DETECTED

## 2023-07-12 ENCOUNTER — OFFICE VISIT (OUTPATIENT)
Dept: FAMILY MEDICINE CLINIC | Facility: CLINIC | Age: 58
End: 2023-07-12
Payer: MEDICAID

## 2023-07-12 VITALS
HEIGHT: 71 IN | OXYGEN SATURATION: 97 % | HEART RATE: 80 BPM | RESPIRATION RATE: 16 BRPM | WEIGHT: 177 LBS | TEMPERATURE: 98 F | SYSTOLIC BLOOD PRESSURE: 110 MMHG | DIASTOLIC BLOOD PRESSURE: 60 MMHG | BODY MASS INDEX: 24.78 KG/M2

## 2023-07-12 DIAGNOSIS — J45.20 MILD INTERMITTENT ASTHMA WITHOUT COMPLICATION: ICD-10-CM

## 2023-07-12 DIAGNOSIS — M79.642 PAIN IN BOTH HANDS: Primary | ICD-10-CM

## 2023-07-12 DIAGNOSIS — M79.641 PAIN IN BOTH HANDS: Primary | ICD-10-CM

## 2023-07-12 PROCEDURE — 99213 OFFICE O/P EST LOW 20 MIN: CPT | Performed by: FAMILY MEDICINE

## 2023-07-12 PROCEDURE — 3078F DIAST BP <80 MM HG: CPT | Performed by: FAMILY MEDICINE

## 2023-07-12 PROCEDURE — 3008F BODY MASS INDEX DOCD: CPT | Performed by: FAMILY MEDICINE

## 2023-07-12 PROCEDURE — 3074F SYST BP LT 130 MM HG: CPT | Performed by: FAMILY MEDICINE

## 2023-07-12 RX ORDER — MELOXICAM 15 MG/1
15 TABLET ORAL DAILY
Qty: 30 TABLET | Refills: 1 | Status: SHIPPED | OUTPATIENT
Start: 2023-07-12

## 2023-07-12 RX ORDER — DEXAMETHASONE 4 MG/1
2 TABLET ORAL 2 TIMES DAILY
Qty: 1 EACH | Refills: 3 | Status: SHIPPED | OUTPATIENT
Start: 2023-07-12

## 2023-07-12 RX ORDER — ATORVASTATIN CALCIUM 10 MG/1
10 TABLET, FILM COATED ORAL NIGHTLY
Qty: 90 TABLET | Refills: 1 | Status: SHIPPED | OUTPATIENT
Start: 2023-07-12

## 2023-07-12 RX ORDER — MONTELUKAST SODIUM 10 MG/1
10 TABLET ORAL NIGHTLY
Qty: 90 TABLET | Refills: 1 | Status: SHIPPED | OUTPATIENT
Start: 2023-07-12

## 2023-07-12 NOTE — PATIENT INSTRUCTIONS
Thank you for choosing Claudia Louise MD at [place] [address]  To Do: [address] [place]  1. Please take meds as directed. [facility] is located in [address]. Monday, Tuesday & Friday - 8 a.m. to 4 p.m. Wednesday, Thursday - 7 a.m. to 3 p.m. The lab is closed daily from 12 p.m.-12:30 p.m. Saturday lab hours by appointment. Call [phone] to schedule the appointment. Please signup for Ph03nix New Media, which is electronic access to your record if you have not done so. All your results will post on there. [email or web address]   You can NOW use Ph03nix New Media to book your appointments with us, or consider using open access scheduling which is through the edward website [email or web address] and type in Claudia Louise MD and follow the links for \"Schedule Online Now\"    To schedule Imaging or tests at [facility] Scheduling [phone], Go to [facility] A ER Building (For example: CT scans, X rays, Ultrasound, MRI)  Cardiac Testing in ER building Building A second floor Cardiac Testing [phone] (For example: Holter Monitor, Cardiac Stress tests,Event Monitor, or 2D Echocardiograms)  [facility] call [phone] usually in [facility] A  Walk in Clinic in [place] at [facility]. [address] Mon-Fri at 8am-7:30 p.m., and Sat/Sun 9:00a. m.-4:30 p.m. Also at [address]  Call [phone] for info     Please call our office about any questions regarding your treatment/medicines/tests as a result of today's visit. For your safety, read the entire package insert of all medicines prescribed to you and be aware of all of the risks of treatment even beyond those discussed today. All therapies have potential risk of harm or side effects or medication interactions.   It is your duty and for your safety to discuss with the pharmacist and our office with questions, and to notify us and stop treatment if problems arise, but know that our intention is that the benefits outweigh those potential risks and we strive to make you healthier and to improve your quality of life. Referrals, and Radiology Information:    If your insurance requires a referral to a specialist, please allow 5 business days to process your referral request.    If Oma Tovar MD orders a CT or MRI, it may take up to 10 business days to receive approval from your insurance company. Once our office has called informing you that the insurance company approved your testing, please call Central Scheduling at [phone]  Please allow our office 5 business days to contact you regarding any testing results. Refill policies:   Allow 3 business days for refills; controlled substances may take longer and must be picked up from the office in person. Narcotic medications can only be filled in 30 day increments and must be refilled at an office visit only. If your prescription is due for a refill, you may be due for a follow-up appointment. We cannot refill your maintenance medications at a preventative wellness visit. To best provide you care, patients receiving maintenance medications need to be seen at least twice a year.

## 2023-07-12 NOTE — PROGRESS NOTES
Subjective:   Patient ID: Ann Philip is a 66year old male. HPI  Mr. Harriett Dandy is a very pleasant 81-year-old gentleman with history of hyperlipidemia, asthma, hypothyroidism, arthritis presenting for bilateral hand pain. This has been ongoing for the past 3-4 days. No recent injury or trauma. Is associated with numbness and pain. He is right-hand dominant. He took some olives for it but with no improvement. I had reviewed past medical and family histories together with allergy and medication lists documented. History/Other:   Review of Systems   Musculoskeletal:  Positive for arthralgias. Negative for joint swelling. Neurological:  Positive for numbness. Negative for weakness. Current Outpatient Medications   Medication Sig Dispense Refill    atorvastatin 10 MG Oral Tab Take 1 tablet (10 mg total) by mouth nightly. 90 tablet 1    FLOVENT  MCG/ACT Inhalation Aerosol Inhale 2 puffs into the lungs 2 (two) times daily. 1 each 3    montelukast 10 MG Oral Tab Take 1 tablet (10 mg total) by mouth nightly. 90 tablet 1    Meloxicam 15 MG Oral Tab Take 1 tablet (15 mg total) by mouth daily. 30 tablet 1    albuterol (2.5 MG/3ML) 0.083% Inhalation Nebu Soln Take 3 mL (2.5 mg total) by nebulization every 6 (six) hours as needed for Wheezing or Shortness of Breath. 100 each 3    Nebulizer Does not apply Misc Use every 4-6 hours as needed for wheezing, Shortness of breath 1 each 0    pantoprazole 40 MG Oral Tab EC Take 1 tablet (40 mg total) by mouth every morning before breakfast. 90 tablet 1    Respiratory Therapy Supplies (NEBULIZER) Does not apply Device Use every 4-6 hours as needed 1 each 0    albuterol 108 (90 Base) MCG/ACT Inhalation Aero Soln Inhale 2 puffs into the lungs every 6 (six) hours as needed. 1 each 3    fexofenadine 180 MG Oral Tab Take 1 tablet (180 mg total) by mouth daily.       Respiratory Therapy Supplies (NEBULIZER) Does not apply Device Please use every 6 hour as needed for Shortness of breath, wheezing 1 Device 0    Plecanatide (TRULANCE) 3 MG Oral Tab Take 1 tablet by mouth daily. (Patient not taking: Reported on 7/12/2023) 30 tablet 1    Olopatadine HCl 0.2 % Ophthalmic Solution Apply 1 drop to eye 3 (three) times daily as needed. (Patient not taking: Reported on 11/1/2022) 1 each 3    Fluocinolone Acetonide Scalp 0.01 % External Oil Place two drops in each ear twice a day. Use for 30 days. Repeat as needed. (Patient not taking: Reported on 11/1/2022) 118.28 mL 0    bacitracin 500 UNIT/GM External Ointment Apply to nasal rim with q-tip twice daily as directed for one month, repeat as necessary (Patient not taking: Reported on 11/1/2022) 28 g 5    Fluocinolone Acetonide 0.01 % Otic Oil 2 drops each ear twice daily for 30 days, repeat as necessary (Patient not taking: Reported on 1/31/2023) 1 each 5     Allergies:No Known Allergies    Objective:   Physical Exam  Vitals reviewed. Constitutional:       General: He is not in acute distress. Musculoskeletal:      Right forearm: No swelling, edema, deformity, tenderness or bony tenderness. Left forearm: Tenderness present. No swelling, edema, deformity or bony tenderness. Right wrist: No swelling, deformity, effusion, tenderness, bony tenderness or snuff box tenderness. Normal range of motion. Normal pulse. Left wrist: No swelling, deformity, effusion, tenderness, bony tenderness or snuff box tenderness. Normal range of motion. Normal pulse. Right hand: Tenderness and bony tenderness present. No swelling. Normal range of motion. Normal strength. Normal sensation. Normal pulse. Left hand: Tenderness and bony tenderness present. No swelling. Normal range of motion. Normal strength. Normal sensation. Normal pulse. Arms:       Comments: Tenderness noted on all the metacarpal phalangeal areas bilaterally. Neurological:      Mental Status: He is alert.          Assessment & Plan:   Pain in both hands  (primary encounter diagnosis)  -Differentials include inflammatory arthritis versus tendinitis or or combination of both  - Rest and if possible  - Try applying ice to affected areas  - Trial of meloxicam 15 mg daily but if with no improvement we will refer to physical therapy/orthopedics  - We will obtain bilateral hand x-rays and await results and will provide more recommendations thereafter. This note was prepared using The Micro voice recognition dictation software. As a result errors may occur. When identified these errors have been corrected. While every attempt is made to correct errors during dictation discrepancies may still exist          Mild intermittent asthma without complication    No orders of the defined types were placed in this encounter. Meds This Visit:  Requested Prescriptions     Signed Prescriptions Disp Refills    atorvastatin 10 MG Oral Tab 90 tablet 1     Sig: Take 1 tablet (10 mg total) by mouth nightly. FLOVENT  MCG/ACT Inhalation Aerosol 1 each 3     Sig: Inhale 2 puffs into the lungs 2 (two) times daily. montelukast 10 MG Oral Tab 90 tablet 1     Sig: Take 1 tablet (10 mg total) by mouth nightly. Meloxicam 15 MG Oral Tab 30 tablet 1     Sig: Take 1 tablet (15 mg total) by mouth daily.        Imaging & Referrals:  XR HAND (MIN 3 VIEWS), RIGHT (CPT=73130)  XR HAND (MIN 3 VIEWS), LEFT (CPT=73130)

## 2023-07-19 ENCOUNTER — HOSPITAL ENCOUNTER (OUTPATIENT)
Dept: GENERAL RADIOLOGY | Age: 58
Discharge: HOME | End: 2023-07-19
Attending: FAMILY MEDICINE
Payer: MEDICAID

## 2023-07-19 DIAGNOSIS — M79.641 PAIN IN BOTH HANDS: ICD-10-CM

## 2023-07-19 DIAGNOSIS — M79.642 PAIN IN BOTH HANDS: ICD-10-CM

## 2023-07-19 PROCEDURE — 73130 X-RAY EXAM OF HAND: CPT | Performed by: FAMILY MEDICINE

## 2023-07-24 ENCOUNTER — OFFICE VISIT (OUTPATIENT)
Dept: ORTHOPEDICS CLINIC | Facility: CLINIC | Age: 58
End: 2023-07-24
Payer: MEDICAID

## 2023-07-24 VITALS — BODY MASS INDEX: 24.78 KG/M2 | HEIGHT: 71 IN | WEIGHT: 177 LBS

## 2023-07-24 DIAGNOSIS — M25.562 CHRONIC PAIN OF LEFT KNEE: ICD-10-CM

## 2023-07-24 DIAGNOSIS — G89.29 CHRONIC PAIN OF LEFT KNEE: ICD-10-CM

## 2023-07-24 DIAGNOSIS — G89.29 CHRONIC PAIN OF RIGHT KNEE: Primary | ICD-10-CM

## 2023-07-24 DIAGNOSIS — M25.561 CHRONIC PAIN OF RIGHT KNEE: Primary | ICD-10-CM

## 2023-07-24 RX ORDER — TRIAMCINOLONE ACETONIDE 40 MG/ML
80 INJECTION, SUSPENSION INTRA-ARTICULAR; INTRAMUSCULAR ONCE
Status: COMPLETED | OUTPATIENT
Start: 2023-07-24 | End: 2023-07-24

## 2023-07-24 RX ADMIN — TRIAMCINOLONE ACETONIDE 80 MG: 40 INJECTION, SUSPENSION INTRA-ARTICULAR; INTRAMUSCULAR at 16:27:00

## 2023-07-24 NOTE — PROCEDURES
Patient reports at least 4 months of improvement from the last injections administered in November 2022. Particularly has pain in the front of the knees with kneeling, has known mild patellofemoral osteoarthritis diagnosed via MRI. Did discuss alternative options including physical therapy, and potential other injections such as Zilretta or viscosupplementation if these injections fail to offer relief. After informed consent, the patient's right and left knees were marked, locally anesthetized with skin refrigerant, prepped with topical antiseptic, and injected with a mixture of 1mL 40mg/mL Kenalog, 2mL 1% lidocaine and 2mL 0.5% marcaine through the inferolateral portal.  A band-aid was applied. The patient tolerated the procedure well.     Osmel Gaston PA-C  [address] Orthopedic Surgery

## 2023-07-25 ENCOUNTER — TELEMEDICINE (OUTPATIENT)
Dept: FAMILY MEDICINE CLINIC | Facility: CLINIC | Age: 58
End: 2023-07-25
Payer: MEDICAID

## 2023-07-25 DIAGNOSIS — J06.9 UPPER RESPIRATORY TRACT INFECTION, UNSPECIFIED TYPE: Primary | ICD-10-CM

## 2023-07-25 PROCEDURE — 99213 OFFICE O/P EST LOW 20 MIN: CPT | Performed by: FAMILY MEDICINE

## 2023-07-25 RX ORDER — AMOXICILLIN AND CLAVULANATE POTASSIUM 875; 125 MG/1; MG/1
1 TABLET, FILM COATED ORAL 2 TIMES DAILY
Qty: 20 TABLET | Refills: 0 | Status: SHIPPED | OUTPATIENT
Start: 2023-07-25 | End: 2023-08-04

## 2023-07-25 RX ORDER — METHYLPREDNISOLONE 4 MG/1
TABLET ORAL
Qty: 1 EACH | Refills: 0 | Status: SHIPPED | OUTPATIENT
Start: 2023-07-25

## 2023-07-25 NOTE — PROGRESS NOTES
Subjective:   Patient ID: Khushbu Painting is a 66year old male. HPI  Mr. Marlene Roldan is a pleasant 28-year-old gentleman with history of asthma, hyperlipidemia, hypothyroidism, arthritis presenting today for cough and congestion for the past 4 days associated with body aches and sore throat and sneezing. Body aches had resolved. He does not report fever. No sick contacts that he knows of. I had reviewed past medical and family histories together with allergy and medication lists documented. History/Other:   Review of Systems   Gastrointestinal:  Negative for abdominal pain, diarrhea, nausea and vomiting. Current Outpatient Medications   Medication Sig Dispense Refill    amoxicillin clavulanate 875-125 MG Oral Tab Take 1 tablet by mouth 2 (two) times daily for 10 days. 20 tablet 0    methylPREDNISolone (MEDROL) 4 MG Oral Tablet Therapy Pack As directed. 1 each 0    atorvastatin 10 MG Oral Tab Take 1 tablet (10 mg total) by mouth nightly. 90 tablet 1    FLOVENT  MCG/ACT Inhalation Aerosol Inhale 2 puffs into the lungs 2 (two) times daily. 1 each 3    montelukast 10 MG Oral Tab Take 1 tablet (10 mg total) by mouth nightly. 90 tablet 1    Meloxicam 15 MG Oral Tab Take 1 tablet (15 mg total) by mouth daily. 30 tablet 1    albuterol (2.5 MG/3ML) 0.083% Inhalation Nebu Soln Take 3 mL (2.5 mg total) by nebulization every 6 (six) hours as needed for Wheezing or Shortness of Breath. 100 each 3    Nebulizer Does not apply Misc Use every 4-6 hours as needed for wheezing, Shortness of breath 1 each 0    pantoprazole 40 MG Oral Tab EC Take 1 tablet (40 mg total) by mouth every morning before breakfast. 90 tablet 1    Respiratory Therapy Supplies (NEBULIZER) Does not apply Device Use every 4-6 hours as needed 1 each 0    Plecanatide (TRULANCE) 3 MG Oral Tab Take 1 tablet by mouth daily.  (Patient not taking: Reported on 7/12/2023) 30 tablet 1    albuterol 108 (90 Base) MCG/ACT Inhalation Aero Soln Inhale 2 puffs into the lungs every 6 (six) hours as needed. 1 each 3    Olopatadine HCl 0.2 % Ophthalmic Solution Apply 1 drop to eye 3 (three) times daily as needed. (Patient not taking: Reported on 11/1/2022) 1 each 3    Fluocinolone Acetonide Scalp 0.01 % External Oil Place two drops in each ear twice a day. Use for 30 days. Repeat as needed. (Patient not taking: Reported on 11/1/2022) 118.28 mL 0    fexofenadine 180 MG Oral Tab Take 1 tablet (180 mg total) by mouth daily. bacitracin 500 UNIT/GM External Ointment Apply to nasal rim with q-tip twice daily as directed for one month, repeat as necessary (Patient not taking: Reported on 11/1/2022) 28 g 5    Fluocinolone Acetonide 0.01 % Otic Oil 2 drops each ear twice daily for 30 days, repeat as necessary (Patient not taking: Reported on 1/31/2023) 1 each 5    Respiratory Therapy Supplies (NEBULIZER) Does not apply Device Please use every 6 hour as needed for Shortness of breath, wheezing 1 Device 0     Allergies:No Known Allergies    Objective:   Physical Exam  Constitutional:       General: He is not in acute distress. Neurological:      Mental Status: He is alert. Psychiatric:         Mood and Affect: Mood normal.         Assessment & Plan:   Upper respiratory tract infection, unspecified type  (primary encounter diagnosis)  -Keep hydrated  - We will going to prescribe amoxicillin/clavulanic acid together with Medrol Dosepak  - Continue with allergy medications  - Call or come in sooner if symptoms worsen or persist      This note was prepared using [employer] [facility] [employer] voice recognition dictation software. As a result errors may occur. When identified these errors have been corrected. While every attempt is made to correct errors during dictation discrepancies may still exist          No orders of the defined types were placed in this encounter.       Meds This Visit:  Requested Prescriptions     Signed Prescriptions Disp Refills    amoxicillin clavulanate 875-125 MG Oral Tab 20 tablet 0     Sig: Take 1 tablet by mouth 2 (two) times daily for 10 days. methylPREDNISolone (MEDROL) 4 MG Oral Tablet Therapy Pack 1 each 0     Sig: As directed.        Imaging & Referrals:  None

## 2023-10-04 ENCOUNTER — TELEPHONE (OUTPATIENT)
Dept: FAMILY MEDICINE CLINIC | Facility: CLINIC | Age: 58
End: 2023-10-04

## 2023-10-04 ENCOUNTER — HOSPITAL ENCOUNTER (OUTPATIENT)
Dept: GENERAL RADIOLOGY | Age: 58
Discharge: HOME | End: 2023-10-04
Attending: FAMILY MEDICINE
Payer: MEDICAID

## 2023-10-04 ENCOUNTER — OFFICE VISIT (OUTPATIENT)
Dept: FAMILY MEDICINE CLINIC | Facility: CLINIC | Age: 58
End: 2023-10-04
Payer: MEDICAID

## 2023-10-04 VITALS
BODY MASS INDEX: 25.34 KG/M2 | RESPIRATION RATE: 16 BRPM | WEIGHT: 181 LBS | DIASTOLIC BLOOD PRESSURE: 74 MMHG | TEMPERATURE: 98 F | OXYGEN SATURATION: 98 % | SYSTOLIC BLOOD PRESSURE: 112 MMHG | HEART RATE: 80 BPM | HEIGHT: 71 IN

## 2023-10-04 DIAGNOSIS — M25.511 ACUTE PAIN OF RIGHT SHOULDER: Primary | ICD-10-CM

## 2023-10-04 DIAGNOSIS — R39.11 URINARY HESITANCY: ICD-10-CM

## 2023-10-04 DIAGNOSIS — M25.511 ACUTE PAIN OF RIGHT SHOULDER: ICD-10-CM

## 2023-10-04 PROCEDURE — 3008F BODY MASS INDEX DOCD: CPT | Performed by: FAMILY MEDICINE

## 2023-10-04 PROCEDURE — 3078F DIAST BP <80 MM HG: CPT | Performed by: FAMILY MEDICINE

## 2023-10-04 PROCEDURE — 3074F SYST BP LT 130 MM HG: CPT | Performed by: FAMILY MEDICINE

## 2023-10-04 PROCEDURE — 99214 OFFICE O/P EST MOD 30 MIN: CPT | Performed by: FAMILY MEDICINE

## 2023-10-04 PROCEDURE — 90471 IMMUNIZATION ADMIN: CPT | Performed by: FAMILY MEDICINE

## 2023-10-04 PROCEDURE — 73030 X-RAY EXAM OF SHOULDER: CPT | Performed by: FAMILY MEDICINE

## 2023-10-04 PROCEDURE — 90686 IIV4 VACC NO PRSV 0.5 ML IM: CPT | Performed by: FAMILY MEDICINE

## 2023-10-04 RX ORDER — FLUOCINOLONE ACETONIDE 0.11 MG/ML
OIL AURICULAR (OTIC)
Qty: 1 EACH | Refills: 5 | Status: SHIPPED | OUTPATIENT
Start: 2023-10-04

## 2023-10-04 RX ORDER — ATORVASTATIN CALCIUM 10 MG/1
10 TABLET, FILM COATED ORAL NIGHTLY
Qty: 90 TABLET | Refills: 1 | Status: SHIPPED | OUTPATIENT
Start: 2023-10-04

## 2023-10-04 NOTE — PROGRESS NOTES
Subjective:   Patient ID: Roberta Ortiz is a 66year old male. HPI  Mr. Rodolfo Dye is a pleasant 53-year-old gentleman with history of asthma, hypothyroidism, hyperlipidemia, arthritis, allergic rhinitis, sinusitis presenting for right shoulder pain which happened 3 days ago while sleeping. He sleeps on his stomach when he had the rib on his right shoulder and since then has had pain on the right deltoid area. He does not have limited range of motion. He is right-handed no numbness or tingling. He also reports that whenever he uses his albuterol nebulizer that he would have palpitations but will go away. He also notices urinary hesitancy and sense of incomplete bladder emptying. PSA was done earlier this year which was normal no fever no cough no chest pain or shortness of breath no nausea no vomiting no abdominal pain. I had reviewed past medical and family histories together with allergy and medication lists documented. History/Other:   Review of Systems   Constitutional:  Negative for fatigue and fever. Respiratory:  Negative for cough and shortness of breath. Cardiovascular:  Negative for chest pain. Gastrointestinal:  Negative for abdominal pain, diarrhea, nausea and vomiting. Neurological:  Negative for dizziness. Current Outpatient Medications   Medication Sig Dispense Refill    atorvastatin 10 MG Oral Tab Take 1 tablet (10 mg total) by mouth nightly. 90 tablet 1    Fluocinolone Acetonide 0.01 % Otic Oil 2 drops each ear twice daily for 30 days, repeat as necessary 1 each 5    FLOVENT  MCG/ACT Inhalation Aerosol Inhale 2 puffs into the lungs 2 (two) times daily. 1 each 3    montelukast 10 MG Oral Tab Take 1 tablet (10 mg total) by mouth nightly. 90 tablet 1    Meloxicam 15 MG Oral Tab Take 1 tablet (15 mg total) by mouth daily.  30 tablet 1    albuterol (2.5 MG/3ML) 0.083% Inhalation Nebu Soln Take 3 mL (2.5 mg total) by nebulization every 6 (six) hours as needed for Wheezing or Shortness of Breath. 100 each 3    Nebulizer Does not apply Misc Use every 4-6 hours as needed for wheezing, Shortness of breath 1 each 0    pantoprazole 40 MG Oral Tab EC Take 1 tablet (40 mg total) by mouth every morning before breakfast. 90 tablet 1    Respiratory Therapy Supplies (NEBULIZER) Does not apply Device Use every 4-6 hours as needed 1 each 0    Plecanatide (TRULANCE) 3 MG Oral Tab Take 1 tablet by mouth daily. 30 tablet 1    albuterol 108 (90 Base) MCG/ACT Inhalation Aero Soln Inhale 2 puffs into the lungs every 6 (six) hours as needed. 1 each 3    Olopatadine HCl 0.2 % Ophthalmic Solution Apply 1 drop to eye 3 (three) times daily as needed. 1 each 3    fexofenadine 180 MG Oral Tab Take 1 tablet (180 mg total) by mouth daily. bacitracin 500 UNIT/GM External Ointment Apply to nasal rim with q-tip twice daily as directed for one month, repeat as necessary 28 g 5    Respiratory Therapy Supplies (NEBULIZER) Does not apply Device Please use every 6 hour as needed for Shortness of breath, wheezing 1 Device 0    Fluocinolone Acetonide Scalp 0.01 % External Oil Place two drops in each ear twice a day. Use for 30 days. Repeat as needed. (Patient not taking: Reported on 10/4/2023) 118.28 mL 0     Allergies:No Known Allergies    Objective:   Physical Exam  HENT:      Right Ear: Tympanic membrane and ear canal normal.      Left Ear: Tympanic membrane and ear canal normal.      Mouth/Throat:      Mouth: Mucous membranes are moist.      Pharynx: Oropharynx is clear. Eyes:      General: No scleral icterus. Conjunctiva/sclera: Conjunctivae normal.   Cardiovascular:      Rate and Rhythm: Normal rate and regular rhythm. Heart sounds: Normal heart sounds. No murmur heard. Pulmonary:      Effort: Pulmonary effort is normal. No respiratory distress. Breath sounds: Normal breath sounds. No wheezing or rales. Abdominal:      General: Bowel sounds are normal. There is no distension.       Palpations: Abdomen is soft. There is no mass. Musculoskeletal:      Right shoulder: Tenderness present. No swelling, deformity or bony tenderness. Normal range of motion. Normal strength. Arms:       Cervical back: Neck supple. Right lower leg: No edema. Left lower leg: No edema. Lymphadenopathy:      Cervical: No cervical adenopathy. Neurological:      Mental Status: He is alert. Psychiatric:         Mood and Affect: Mood normal.         Behavior: Behavior normal.         Assessment & Plan:   Acute pain of right shoulder  (primary encounter diagnosis)  -Likely due to deltoid sprain/strain  - May take Tylenol or ibuprofen as needed for fever pain  - We will order x-ray of the right shoulder and will provide with recommendations thereafter. Urinary hesitancy  -We will check UA but will also refer to urology for further evaluation management; he declined any medications for now. Follow-up for his next wellness exam.    This note was prepared using [email or web address] voice recognition dictation software. As a result errors may occur. When identified these errors have been corrected. While every attempt is made to correct errors during dictation discrepancies may still exist          Orders Placed This Encounter      Urinalysis, Routine [E]      Fluzone Quadrivalent 6mo+ 0.5mL      Meds This Visit:  Requested Prescriptions     Signed Prescriptions Disp Refills    atorvastatin 10 MG Oral Tab 90 tablet 1     Sig: Take 1 tablet (10 mg total) by mouth nightly.     Fluocinolone Acetonide 0.01 % Otic Oil 1 each 5     Si drops each ear twice daily for 30 days, repeat as necessary       Imaging & Referrals:  INFLUENZA VACCINE, QUAD, PRESERVATIVE FREE, 0.5 ML  UROLOGY - INTERNAL  XR SHOULDER, COMPLETE (MIN 2 VIEWS), RIGHT (CPT=73030)

## 2023-10-04 NOTE — PATIENT INSTRUCTIONS
Thank you for choosing Jose Angel Coto MD at [place] [address]  To Do: [address]  1. Please see below  [facility] is located in [address]. Monday, Tuesday & Friday - 8 a.m. to 4 p.m. Wednesday, Thursday - 7 a.m. to 3 p.m. The lab is closed daily from 12 p.m.-12:30 p.m. Saturday lab hours by appointment. Call [phone] to schedule the appointment. Please signup for NovaSom, which is electronic access to your record if you have not done so. All your results will post on there. [email or web address]   You can NOW use NovaSom to book your appointments with us, or consider using open access scheduling which is through the edward website [email or web address] and type in Jose Angel Coto MD and follow the links for \"Schedule Online Now\"    To schedule Imaging or tests at [facility] Scheduling [phone], Go to [facility] A ER Building (For example: CT scans, X rays, Ultrasound, MRI)  Cardiac Testing in ER building Building A second floor Cardiac Testing [phone] (For example: Holter Monitor, Cardiac Stress tests,Event Monitor, or 2D Echocardiograms)  [facility] call [phone] usually in Bldg A  Walk in Clinic in [place] at [facility]. [address] Mon-Fri at 8am-7:30 p.m., and Sat/Sun 9:00a. m.-4:30 p.m. Also at [address]  Call [phone] for info     Please call our office about any questions regarding your treatment/medicines/tests as a result of today's visit. For your safety, read the entire package insert of all medicines prescribed to you and be aware of all of the risks of treatment even beyond those discussed today. All therapies have potential risk of harm or side effects or medication interactions.   It is your duty and for your safety to discuss with the pharmacist and our office with questions, and to notify us and stop treatment if problems arise, but know that our intention is that the benefits outweigh those potential risks and we strive to make you healthier and to improve your quality of life. Referrals, and Radiology Information:    If your insurance requires a referral to a specialist, please allow 5 business days to process your referral request.    If Arline Mohamud MD orders a CT or MRI, it may take up to 10 business days to receive approval from your insurance company. Once our office has called informing you that the insurance company approved your testing, please call Central Scheduling at [phone]  Please allow our office 5 business days to contact you regarding any testing results. Refill policies:   Allow 3 business days for refills; controlled substances may take longer and must be picked up from the office in person. Narcotic medications can only be filled in 30 day increments and must be refilled at an office visit only. If your prescription is due for a refill, you may be due for a follow-up appointment. We cannot refill your maintenance medications at a preventative wellness visit. To best provide you care, patients receiving maintenance medications need to be seen at least twice a year.

## 2023-10-04 NOTE — TELEPHONE ENCOUNTER
Rica BLACK [facility] Fluocinolone Acetonide 0.01%oil. Key is [id number]. Placed in [address] [place] [address] [place] folder.

## 2023-10-06 ENCOUNTER — TELEPHONE (OUTPATIENT)
Dept: FAMILY MEDICINE CLINIC | Facility: CLINIC | Age: 58
End: 2023-10-06

## 2023-10-06 NOTE — TELEPHONE ENCOUNTER
Rec'd incoming fax approval for medication from [facility] for Fluocinolone Acetonide 0.01% oil effective 07/08/2023 and ends 10/06/2024. Paperwork placed in [place] folder. Requested Quantity.

## 2023-10-23 ENCOUNTER — OFFICE VISIT (OUTPATIENT)
Dept: ORTHOPEDICS CLINIC | Facility: CLINIC | Age: 58
End: 2023-10-23
Payer: MEDICAID

## 2023-10-23 VITALS — WEIGHT: 181 LBS | BODY MASS INDEX: 25.34 KG/M2 | HEIGHT: 71 IN

## 2023-10-23 DIAGNOSIS — M25.561 CHRONIC PAIN OF RIGHT KNEE: ICD-10-CM

## 2023-10-23 DIAGNOSIS — G89.29 CHRONIC PAIN OF RIGHT KNEE: ICD-10-CM

## 2023-10-23 DIAGNOSIS — G89.29 CHRONIC PAIN OF LEFT KNEE: ICD-10-CM

## 2023-10-23 DIAGNOSIS — M25.562 CHRONIC PAIN OF LEFT KNEE: ICD-10-CM

## 2023-10-23 DIAGNOSIS — M17.0 PRIMARY OSTEOARTHRITIS OF BOTH KNEES: Primary | ICD-10-CM

## 2023-10-23 PROCEDURE — 99213 OFFICE O/P EST LOW 20 MIN: CPT | Performed by: PHYSICIAN ASSISTANT

## 2023-10-23 PROCEDURE — 3008F BODY MASS INDEX DOCD: CPT | Performed by: PHYSICIAN ASSISTANT

## 2023-10-23 NOTE — PROGRESS NOTES
[facility] Progress Note    Subjective: Patient returns to clinic after previously being seen on 7/24/2023 for steroid injections. He reports about 1 week of relief from the most recent steroid injections and is interested in alternative measures. He continues to experience pain along the anterior aspect of both knees that worsens with stair climbing. He has already been through physical therapy last year without any significant relief. He does have mild osteoarthritis demonstrated on MRI scans of both knees. Objective: Patient seated comfortably in the exam chair. Alert and oriented x3. Nonlabored breathing. Gross neurologically intact. Ambulating independently. Both knees without any significant redness, warmth, or effusion. No significant tenderness along the bilateral patellar facets. No significant patellofemoral crepitus felt throughout range of motion. No pain with patellar translation. There is some pain along the proximal patellar pole bilaterally. Assessment/Plan: I thoroughly went through nonsurgical treatment options with the patient in detail. Since he has failed to receive relief from steroid injections, I did discuss possible viscosupplementation which may be effective especially given his mild degree of arthritis. I also discussed potential PRP injection which I would defer to my sports colleagues for if we reach that point. Patient did endorse interest in viscosupplementation injections, for which prior authorization was placed. No indication for surgical intervention at this point in time. We will contact the patient after prior authorization has been obtained. His questions were sought and answered satisfactorily. He is happy with the plan and will follow as advised. Randal Angel PA-C  [facility]    This note was dictated using Dragon software.   While it was briefly proofread prior to completion, some grammatical, spelling, and word choice errors due to dictation may still occur.

## 2023-10-25 ENCOUNTER — TELEPHONE (OUTPATIENT)
Dept: ORTHOPEDICS CLINIC | Facility: CLINIC | Age: 58
End: 2023-10-25

## 2023-11-13 ENCOUNTER — TELEMEDICINE (OUTPATIENT)
Dept: FAMILY MEDICINE CLINIC | Facility: CLINIC | Age: 58
End: 2023-11-13
Payer: MEDICAID

## 2023-11-13 DIAGNOSIS — J32.9 SINUSITIS, UNSPECIFIED CHRONICITY, UNSPECIFIED LOCATION: Primary | ICD-10-CM

## 2023-11-13 RX ORDER — AMOXICILLIN AND CLAVULANATE POTASSIUM 875; 125 MG/1; MG/1
1 TABLET, FILM COATED ORAL 2 TIMES DAILY
Qty: 20 TABLET | Refills: 0 | Status: SHIPPED | OUTPATIENT
Start: 2023-11-13 | End: 2023-11-23

## 2023-11-13 NOTE — PROGRESS NOTES
Subjective:   Patient ID: Gita Cannon is a 66year old male. HPI  Mr. Mendoza Atkins is a very pleasant 74-year-old gentleman with history of asthma, hyperlipidemia, hypothyroidism, arthritis and allergic rhinitis presenting for cough for the past 4-5 days. Cough is nonproductive associated with facial and chest congestion. This is also associated with body aches. No fever no shortness of breath. He has been taking his medications compliantly including his nebulizer which tends to make his cough looser. I had reviewed past medical and family histories together with allergy and medication lists documented. History/Other:   Review of Systems   Constitutional:  Negative for fatigue and fever. Respiratory:  Negative for shortness of breath. Cardiovascular:  Negative for chest pain. Gastrointestinal:  Negative for abdominal pain, diarrhea, nausea and vomiting. Current Outpatient Medications   Medication Sig Dispense Refill    amoxicillin clavulanate 875-125 MG Oral Tab Take 1 tablet by mouth 2 (two) times daily for 10 days. 20 tablet 0    atorvastatin 10 MG Oral Tab Take 1 tablet (10 mg total) by mouth nightly. 90 tablet 1    Fluocinolone Acetonide 0.01 % Otic Oil 2 drops each ear twice daily for 30 days, repeat as necessary 1 each 5    FLOVENT  MCG/ACT Inhalation Aerosol Inhale 2 puffs into the lungs 2 (two) times daily. 1 each 3    montelukast 10 MG Oral Tab Take 1 tablet (10 mg total) by mouth nightly. 90 tablet 1    Meloxicam 15 MG Oral Tab Take 1 tablet (15 mg total) by mouth daily. 30 tablet 1    albuterol (2.5 MG/3ML) 0.083% Inhalation Nebu Soln Take 3 mL (2.5 mg total) by nebulization every 6 (six) hours as needed for Wheezing or Shortness of Breath.  100 each 3    Nebulizer Does not apply Misc Use every 4-6 hours as needed for wheezing, Shortness of breath 1 each 0    pantoprazole 40 MG Oral Tab EC Take 1 tablet (40 mg total) by mouth every morning before breakfast. 90 tablet 1    Respiratory Therapy Supplies (NEBULIZER) Does not apply Device Use every 4-6 hours as needed 1 each 0    Plecanatide (TRULANCE) 3 MG Oral Tab Take 1 tablet by mouth daily. 30 tablet 1    albuterol 108 (90 Base) MCG/ACT Inhalation Aero Soln Inhale 2 puffs into the lungs every 6 (six) hours as needed. 1 each 3    Olopatadine HCl 0.2 % Ophthalmic Solution Apply 1 drop to eye 3 (three) times daily as needed. 1 each 3    Fluocinolone Acetonide Scalp 0.01 % External Oil Place two drops in each ear twice a day. Use for 30 days. Repeat as needed. (Patient not taking: Reported on 10/4/2023) 118.28 mL 0    fexofenadine 180 MG Oral Tab Take 1 tablet (180 mg total) by mouth daily. bacitracin 500 UNIT/GM External Ointment Apply to nasal rim with q-tip twice daily as directed for one month, repeat as necessary 28 g 5    Respiratory Therapy Supplies (NEBULIZER) Does not apply Device Please use every 6 hour as needed for Shortness of breath, wheezing 1 Device 0     Allergies:No Known Allergies    Objective:   Physical Exam  Constitutional:       General: He is not in acute distress. Neurological:      Mental Status: He is alert. Assessment & Plan:   1. Sinusitis, unspecified chronicity, unspecified location    -Continue with current medication regimen  - Keep hydrated  - May take Tylenol as needed for fever or pain  - We will treat with Augmentin which was sent to his pharmacy today  - Call or come sooner if symptoms worsen or persist      This note was prepared using [employer] [facility] [employer] voice recognition dictation software. As a result errors may occur. When identified these errors have been corrected. While every attempt is made to correct errors during dictation discrepancies may still exist            No orders of the defined types were placed in this encounter.       Meds This Visit:  Requested Prescriptions     Signed Prescriptions Disp Refills    amoxicillin clavulanate 875-125 MG Oral Tab 20 tablet 0     Sig: Take 1 tablet by mouth 2 (two) times daily for 10 days.        Imaging & Referrals:  None

## 2023-12-06 ENCOUNTER — OFFICE VISIT (OUTPATIENT)
Dept: ORTHOPEDICS CLINIC | Facility: CLINIC | Age: 58
End: 2023-12-06
Payer: MEDICAID

## 2023-12-06 VITALS — WEIGHT: 181 LBS | HEIGHT: 71 IN | BODY MASS INDEX: 25.34 KG/M2

## 2023-12-06 DIAGNOSIS — M17.0 PRIMARY OSTEOARTHRITIS OF BOTH KNEES: Primary | ICD-10-CM

## 2023-12-06 NOTE — PROCEDURES
After informed consent, the patient's right and left knees were marked, locally anesthetized with skin refrigerant, prepped with topical antiseptic, and injected with 6mL of 48mg/6mL Synvisc One through the inferolateral portal.  A band-aid was applied. The patient tolerated the procedure well.     Nacho Kam PA-C  [address] Orthopedic Surgery

## 2023-12-07 ENCOUNTER — OFFICE VISIT (OUTPATIENT)
Dept: SURGERY | Facility: CLINIC | Age: 58
End: 2023-12-07

## 2023-12-07 DIAGNOSIS — N40.1 BPH WITH OBSTRUCTION/LOWER URINARY TRACT SYMPTOMS: ICD-10-CM

## 2023-12-07 DIAGNOSIS — N13.8 BPH WITH OBSTRUCTION/LOWER URINARY TRACT SYMPTOMS: ICD-10-CM

## 2023-12-07 DIAGNOSIS — R82.90 URINE FINDING: Primary | ICD-10-CM

## 2023-12-07 LAB
APPEARANCE: CLEAR
BILIRUBIN: NEGATIVE
GLUCOSE (URINE DIPSTICK): NEGATIVE MG/DL
KETONES (URINE DIPSTICK): NEGATIVE MG/DL
LEUKOCYTES: NEGATIVE
MULTISTIX LOT#: NORMAL NUMERIC
NITRITE, URINE: NEGATIVE
OCCULT BLOOD: NEGATIVE
PH, URINE: 5.5 (ref 4.5–8)
PROTEIN (URINE DIPSTICK): NEGATIVE MG/DL
SPECIFIC GRAVITY: 1.02 (ref 1–1.03)
URINE-COLOR: YELLOW
UROBILINOGEN,SEMI-QN: 0.2 MG/DL (ref 0–1.9)

## 2023-12-07 PROCEDURE — 51798 US URINE CAPACITY MEASURE: CPT | Performed by: SURGERY

## 2023-12-07 PROCEDURE — 81003 URINALYSIS AUTO W/O SCOPE: CPT | Performed by: SURGERY

## 2023-12-07 PROCEDURE — 99204 OFFICE O/P NEW MOD 45 MIN: CPT | Performed by: SURGERY

## 2023-12-07 RX ORDER — TAMSULOSIN HYDROCHLORIDE 0.4 MG/1
0.4 CAPSULE ORAL EVERY EVENING
Qty: 90 CAPSULE | Refills: 6 | Status: SHIPPED | OUTPATIENT
Start: 2023-12-07

## 2023-12-07 NOTE — PROGRESS NOTES
Urology Clinic Note - New Patient    Referring Provider:  No referring provider defined for this encounter. Primary Care Provider:  Sekou Rivera MD     Chief Complaint:   BPH/LUTS    HPI:   Yoan Mcclain is a 66year old male with history of asthma, hyperlipidemia referred for lower urinary tract symptoms. He complains of nocturia multiple times per night approximately every hour at times. He does drink some water when he wakes up at night due to dry mouth. He feels that at nighttime he has a slow urinary stream and significant urinary hesitancy. During the daytime this is less bothersome. He drinks 2 cups of tea in the morning only. Denies gross hematuria. His most recent PSA on 2/7/2023 was 2.46. AUA symptom score is 15/mixed with LUTS. Family history of malignancy: Denies    Post-void residual bladder scan: 17 mL    Urinalysis (clinic dip): Negative    PSA:  Lab Results   Component Value Date    PSAS 2.46 02/07/2023    PSAS 1.62 09/24/2019        History:     Past Medical History:   Diagnosis Date    Allergic rhinitis     Asthma     Hyperlipidemia        No past surgical history on file. Family History   Problem Relation Age of Onset    Heart Disorder Father     Hypertension Mother     Heart Disorder Mother        Social History     Socioeconomic History    Marital status:    Tobacco Use    Smoking status: Former    Smokeless tobacco: Former    Tobacco comments:     quit 20 years ago   Vaping Use    Vaping Use: Never used   Substance and Sexual Activity    Alcohol use: Never    Drug use: Never   Other Topics Concern    Caffeine Concern Yes     Comment: occassionally    Exercise Yes     Comment: every other day       Medications (Active prior to today's visit):  Current Outpatient Medications   Medication Sig Dispense Refill    atorvastatin 10 MG Oral Tab Take 1 tablet (10 mg total) by mouth nightly.  90 tablet 1    Fluocinolone Acetonide 0.01 % Otic Oil 2 drops each ear twice daily for 30 days, repeat as necessary 1 each 5    FLOVENT  MCG/ACT Inhalation Aerosol Inhale 2 puffs into the lungs 2 (two) times daily. 1 each 3    montelukast 10 MG Oral Tab Take 1 tablet (10 mg total) by mouth nightly. 90 tablet 1    Meloxicam 15 MG Oral Tab Take 1 tablet (15 mg total) by mouth daily. 30 tablet 1    albuterol (2.5 MG/3ML) 0.083% Inhalation Nebu Soln Take 3 mL (2.5 mg total) by nebulization every 6 (six) hours as needed for Wheezing or Shortness of Breath. 100 each 3    Nebulizer Does not apply Misc Use every 4-6 hours as needed for wheezing, Shortness of breath 1 each 0    pantoprazole 40 MG Oral Tab EC Take 1 tablet (40 mg total) by mouth every morning before breakfast. 90 tablet 1    Respiratory Therapy Supplies (NEBULIZER) Does not apply Device Use every 4-6 hours as needed 1 each 0    Plecanatide (TRULANCE) 3 MG Oral Tab Take 1 tablet by mouth daily. 30 tablet 1    albuterol 108 (90 Base) MCG/ACT Inhalation Aero Soln Inhale 2 puffs into the lungs every 6 (six) hours as needed. 1 each 3    Olopatadine HCl 0.2 % Ophthalmic Solution Apply 1 drop to eye 3 (three) times daily as needed. 1 each 3    Fluocinolone Acetonide Scalp 0.01 % External Oil Place two drops in each ear twice a day. Use for 30 days. Repeat as needed. 118.28 mL 0    fexofenadine 180 MG Oral Tab Take 1 tablet (180 mg total) by mouth daily. bacitracin 500 UNIT/GM External Ointment Apply to nasal rim with q-tip twice daily as directed for one month, repeat as necessary 28 g 5    Respiratory Therapy Supplies (NEBULIZER) Does not apply Device Please use every 6 hour as needed for Shortness of breath, wheezing 1 Device 0       Allergies:  No Known Allergies      Review of Systems:   A comprehensive 10-point review of systems was completed. Pertinent positives and negatives are noted in the the HPI.     Physical Exam:   CONSTITUTIONAL: Well developed, well nourished, in no acute distress  NEUROLOGIC: Alert and oriented  HEAD: Normocephalic, atraumatic  EYES: Sclera non-icteric  ENT: Hearing intact, moist mucous membranes  NECK: No obvious goiter or masses  RESPIRATORY: Normal respiratory effort  SKIN: No evident rashes  ABDOMEN: Soft, non-tender, non-distended  GENITOURINARY: Normal phallus, orthotopic meatus, normal bilateral testicles  DIGITAL RECTAL EXAM: ~45 gram prostate, no nodules or tenderness    Assessment & Plan:   Rima Chung is a 66year old male with history of asthma, hyperlipidemia referred for lower urinary tract symptoms. He complains of nocturia multiple times per night approximately every hour at times. He does drink some water when he wakes up at night due to dry mouth. He feels that at nighttime he has a slow urinary stream and significant urinary hesitancy. During the daytime this is less bothersome. He drinks 2 cups of tea in the morning only. Denies gross hematuria. His most recent PSA on 2/7/2023 was 2.46.    -Start tamsulosin 0.4 mg nightly  -Return to clinic in 3 months for symptom check      Thank you for this consult. I have personally reviewed all relevant medical records, labs, and imaging. Medical Decision Making  BPH/LUTS: Undiagnosed new problem    Amount and/or Complexity of Data Reviewed  External Data Reviewed: labs and notes. Risk  Prescription drug management. Helen Dominguez.  Blanca Painter MD  Staff Urologist  [facility]  Office: [phone]

## 2023-12-18 ENCOUNTER — TELEMEDICINE (OUTPATIENT)
Dept: FAMILY MEDICINE CLINIC | Facility: CLINIC | Age: 58
End: 2023-12-18
Payer: MEDICAID

## 2023-12-18 DIAGNOSIS — E03.9 HYPOTHYROIDISM, UNSPECIFIED TYPE: ICD-10-CM

## 2023-12-18 DIAGNOSIS — M15.9 OSTEOARTHRITIS OF MULTIPLE JOINTS, UNSPECIFIED OSTEOARTHRITIS TYPE: ICD-10-CM

## 2023-12-18 DIAGNOSIS — R09.89 CHEST CONGESTION: Primary | ICD-10-CM

## 2023-12-18 RX ORDER — AMOXICILLIN AND CLAVULANATE POTASSIUM 875; 125 MG/1; MG/1
1 TABLET, FILM COATED ORAL 2 TIMES DAILY
Qty: 20 TABLET | Refills: 0 | Status: SHIPPED | OUTPATIENT
Start: 2023-12-18 | End: 2023-12-28

## 2023-12-18 NOTE — PROGRESS NOTES
Subjective:   Patient ID: Janice Verma is a 66year old male. HPI  Mr. Aniya Baptiste is a pleasant 73-year-old gentleman with history of asthma, hypothyroidism, hyperlipidemia, arthritis presenting today for video visit for chest and nasal congestion which has been ongoing for the past few days. He has been exposed to a friend of his who tested positive for COVID-19. He initially had fever last night but fever had resolved. He also had bodyaches which had resolved. Cough is mostly dry. However in the past he has had new numerous sinus and lung infections with good response to antibiotics. No shortness of breath no nausea no vomiting no abdominal pain. He also has arthritis mostly in the hands and is wondering if he could see a specialist for it but he had tried meloxicam but with not much improvement. He is due to have his laboratory test done. I had reviewed past medical and family histories together with allergy and medication lists documented. History/Other:   Review of Systems   Constitutional:  Negative for fatigue. HENT:  Positive for congestion and sore throat. Negative for trouble swallowing. Respiratory:  Positive for cough. Negative for shortness of breath and wheezing. Cardiovascular:  Negative for chest pain. Gastrointestinal:  Negative for abdominal pain, diarrhea, nausea and vomiting. Musculoskeletal:  Positive for arthralgias. Current Outpatient Medications   Medication Sig Dispense Refill    amoxicillin clavulanate 875-125 MG Oral Tab Take 1 tablet by mouth 2 (two) times daily for 10 days. 20 tablet 0    tamsulosin 0.4 MG Oral Cap Take 1 capsule (0.4 mg total) by mouth every evening. 90 capsule 6    atorvastatin 10 MG Oral Tab Take 1 tablet (10 mg total) by mouth nightly.  90 tablet 1    Fluocinolone Acetonide 0.01 % Otic Oil 2 drops each ear twice daily for 30 days, repeat as necessary 1 each 5    FLOVENT  MCG/ACT Inhalation Aerosol Inhale 2 puffs into the lungs 2 (two) times daily. 1 each 3    montelukast 10 MG Oral Tab Take 1 tablet (10 mg total) by mouth nightly. 90 tablet 1    Meloxicam 15 MG Oral Tab Take 1 tablet (15 mg total) by mouth daily. 30 tablet 1    albuterol (2.5 MG/3ML) 0.083% Inhalation Nebu Soln Take 3 mL (2.5 mg total) by nebulization every 6 (six) hours as needed for Wheezing or Shortness of Breath. 100 each 3    Nebulizer Does not apply Misc Use every 4-6 hours as needed for wheezing, Shortness of breath 1 each 0    pantoprazole 40 MG Oral Tab EC Take 1 tablet (40 mg total) by mouth every morning before breakfast. 90 tablet 1    Respiratory Therapy Supplies (NEBULIZER) Does not apply Device Use every 4-6 hours as needed 1 each 0    Plecanatide (TRULANCE) 3 MG Oral Tab Take 1 tablet by mouth daily. 30 tablet 1    albuterol 108 (90 Base) MCG/ACT Inhalation Aero Soln Inhale 2 puffs into the lungs every 6 (six) hours as needed. 1 each 3    Olopatadine HCl 0.2 % Ophthalmic Solution Apply 1 drop to eye 3 (three) times daily as needed. 1 each 3    Fluocinolone Acetonide Scalp 0.01 % External Oil Place two drops in each ear twice a day. Use for 30 days. Repeat as needed. 118.28 mL 0    fexofenadine 180 MG Oral Tab Take 1 tablet (180 mg total) by mouth daily. bacitracin 500 UNIT/GM External Ointment Apply to nasal rim with q-tip twice daily as directed for one month, repeat as necessary 28 g 5    Respiratory Therapy Supplies (NEBULIZER) Does not apply Device Please use every 6 hour as needed for Shortness of breath, wheezing 1 Device 0     Allergies:No Known Allergies    Objective:   Physical Exam  Constitutional:       General: He is not in acute distress. Neurological:      Mental Status: He is alert. Assessment & Plan:   1. Chest congestion   -Increase fluid intake  - May take Tylenol or ibuprofen as needed for fever or pain  - Will send for Augmentin   2.  Osteoarthritis of multiple joints, unspecified osteoarthritis type   -We will refer him to rheumatology for further evaluation management   3. Hypothyroidism, unspecified type   -We will check routine labs as ordered below and will await results and provide recommendations thereafter if necessary     This note was prepared using [employer] voice recognition dictation software. As a result errors may occur. When identified these errors have been corrected. While every attempt is made to correct errors during dictation discrepancies may still exist          Orders Placed This Encounter   Procedures    CBC W Differential W Platelet [E]    Comp Metabolic Panel (14) [E]    Lipid Panel [E]    TSH and Free T4 [E]       Meds This Visit:  Requested Prescriptions     Signed Prescriptions Disp Refills    amoxicillin clavulanate 875-125 MG Oral Tab 20 tablet 0     Sig: Take 1 tablet by mouth 2 (two) times daily for 10 days.        Imaging & Referrals:  RHEUMATOLOGY - INTERNAL

## 2024-01-23 ENCOUNTER — LAB ENCOUNTER (OUTPATIENT)
Dept: LAB | Age: 59
End: 2024-01-23
Attending: FAMILY MEDICINE
Payer: MEDICAID

## 2024-01-23 DIAGNOSIS — R39.11 URINARY HESITANCY: ICD-10-CM

## 2024-01-23 DIAGNOSIS — E03.9 HYPOTHYROIDISM, UNSPECIFIED TYPE: ICD-10-CM

## 2024-01-23 LAB
ALBUMIN SERPL-MCNC: 3.8 G/DL (ref 3.4–5)
ALBUMIN/GLOB SERPL: 1.1 {RATIO} (ref 1–2)
ALP LIVER SERPL-CCNC: 88 U/L
ANION GAP SERPL CALC-SCNC: 1 MMOL/L (ref 0–18)
AST SERPL-CCNC: 27 U/L (ref 15–37)
BASOPHILS # BLD AUTO: 0.09 X10(3) UL (ref 0–0.2)
BASOPHILS NFR BLD AUTO: 1.3 %
BILIRUB SERPL-MCNC: 1.3 MG/DL (ref 0.1–2)
BILIRUB UR QL STRIP.AUTO: NEGATIVE
BUN BLD-MCNC: 20 MG/DL (ref 9–23)
CALCIUM BLD-MCNC: 8.8 MG/DL (ref 8.5–10.1)
CHLORIDE SERPL-SCNC: 110 MMOL/L (ref 98–112)
CHOLEST SERPL-MCNC: 156 MG/DL (ref ?–200)
CLARITY UR REFRACT.AUTO: CLEAR
CO2 SERPL-SCNC: 29 MMOL/L (ref 21–32)
COLOR UR AUTO: YELLOW
CREAT BLD-MCNC: 0.96 MG/DL
EGFRCR SERPLBLD CKD-EPI 2021: 93 ML/MIN/1.73M2 (ref 60–?)
EOSINOPHIL # BLD AUTO: 0.63 X10(3) UL (ref 0–0.7)
EOSINOPHIL NFR BLD AUTO: 8.8 %
ERYTHROCYTE [DISTWIDTH] IN BLOOD BY AUTOMATED COUNT: 12.9 %
FASTING PATIENT LIPID ANSWER: YES
FASTING STATUS PATIENT QL REPORTED: YES
GLOBULIN PLAS-MCNC: 3.4 G/DL (ref 2.8–4.4)
GLUCOSE BLD-MCNC: 103 MG/DL (ref 70–99)
GLUCOSE UR STRIP.AUTO-MCNC: NORMAL MG/DL
HCT VFR BLD AUTO: 44.3 %
HDLC SERPL-MCNC: 60 MG/DL (ref 40–59)
HGB BLD-MCNC: 15.2 G/DL
IMM GRANULOCYTES # BLD AUTO: 0.01 X10(3) UL (ref 0–1)
IMM GRANULOCYTES NFR BLD: 0.1 %
KETONES UR STRIP.AUTO-MCNC: NEGATIVE MG/DL
LDLC SERPL CALC-MCNC: 86 MG/DL (ref ?–100)
LEUKOCYTE ESTERASE UR QL STRIP.AUTO: NEGATIVE
LYMPHOCYTES # BLD AUTO: 3.44 X10(3) UL (ref 1–4)
LYMPHOCYTES NFR BLD AUTO: 48.1 %
MCH RBC QN AUTO: 30.3 PG (ref 26–34)
MCHC RBC AUTO-ENTMCNC: 34.3 G/DL (ref 31–37)
MCV RBC AUTO: 88.4 FL
MONOCYTES # BLD AUTO: 0.51 X10(3) UL (ref 0.1–1)
MONOCYTES NFR BLD AUTO: 7.1 %
NEUTROPHILS # BLD AUTO: 2.47 X10 (3) UL (ref 1.5–7.7)
NEUTROPHILS # BLD AUTO: 2.47 X10(3) UL (ref 1.5–7.7)
NEUTROPHILS NFR BLD AUTO: 34.6 %
NITRITE UR QL STRIP.AUTO: NEGATIVE
NONHDLC SERPL-MCNC: 96 MG/DL (ref ?–130)
OSMOLALITY SERPL CALC.SUM OF ELEC: 293 MOSM/KG (ref 275–295)
PH UR STRIP.AUTO: 5.5 [PH] (ref 5–8)
PLATELET # BLD AUTO: 248 10(3)UL (ref 150–450)
POTASSIUM SERPL-SCNC: 4.1 MMOL/L (ref 3.5–5.1)
PROT SERPL-MCNC: 7.2 G/DL (ref 6.4–8.2)
RBC # BLD AUTO: 5.01 X10(6)UL
RBC UR QL AUTO: NEGATIVE
SODIUM SERPL-SCNC: 140 MMOL/L (ref 136–145)
SP GR UR STRIP.AUTO: 1.03 (ref 1–1.03)
T4 FREE SERPL-MCNC: 1.1 NG/DL (ref 0.8–1.7)
TRIGL SERPL-MCNC: 48 MG/DL (ref 30–149)
TSI SER-ACNC: 2.43 MIU/ML (ref 0.36–3.74)
UROBILINOGEN UR STRIP.AUTO-MCNC: NORMAL MG/DL
VLDLC SERPL CALC-MCNC: 8 MG/DL (ref 0–30)
WBC # BLD AUTO: 7.2 X10(3) UL (ref 4–11)

## 2024-01-23 PROCEDURE — 85025 COMPLETE CBC W/AUTO DIFF WBC: CPT

## 2024-01-23 PROCEDURE — 84439 ASSAY OF FREE THYROXINE: CPT

## 2024-01-23 PROCEDURE — 84443 ASSAY THYROID STIM HORMONE: CPT

## 2024-01-23 PROCEDURE — 80061 LIPID PANEL: CPT

## 2024-01-23 PROCEDURE — 36415 COLL VENOUS BLD VENIPUNCTURE: CPT

## 2024-01-23 PROCEDURE — 80053 COMPREHEN METABOLIC PANEL: CPT

## 2024-01-23 PROCEDURE — 81003 URINALYSIS AUTO W/O SCOPE: CPT

## 2024-01-25 ENCOUNTER — TELEMEDICINE (OUTPATIENT)
Dept: FAMILY MEDICINE CLINIC | Facility: CLINIC | Age: 59
End: 2024-01-25
Payer: MEDICAID

## 2024-01-25 DIAGNOSIS — R04.0 EPISTAXIS: Primary | ICD-10-CM

## 2024-01-25 PROCEDURE — 99213 OFFICE O/P EST LOW 20 MIN: CPT | Performed by: FAMILY MEDICINE

## 2024-01-31 ENCOUNTER — OFFICE VISIT (OUTPATIENT)
Dept: ORTHOPEDICS CLINIC | Facility: CLINIC | Age: 59
End: 2024-01-31
Payer: MEDICAID

## 2024-01-31 VITALS — BODY MASS INDEX: 25.34 KG/M2 | WEIGHT: 181 LBS | HEIGHT: 71 IN

## 2024-01-31 DIAGNOSIS — M25.562 CHRONIC PAIN OF LEFT KNEE: ICD-10-CM

## 2024-01-31 DIAGNOSIS — M25.561 CHRONIC PAIN OF RIGHT KNEE: ICD-10-CM

## 2024-01-31 DIAGNOSIS — G89.29 CHRONIC PAIN OF LEFT KNEE: ICD-10-CM

## 2024-01-31 DIAGNOSIS — S80.02XD CONTUSION OF LEFT KNEE, SUBSEQUENT ENCOUNTER: Primary | ICD-10-CM

## 2024-01-31 DIAGNOSIS — G89.29 CHRONIC PAIN OF RIGHT KNEE: ICD-10-CM

## 2024-01-31 PROCEDURE — 99212 OFFICE O/P EST SF 10 MIN: CPT | Performed by: PHYSICIAN ASSISTANT

## 2024-01-31 PROCEDURE — 3008F BODY MASS INDEX DOCD: CPT | Performed by: PHYSICIAN ASSISTANT

## 2024-01-31 RX ORDER — FLUTICASONE PROPIONATE 220 UG/1
2 AEROSOL, METERED RESPIRATORY (INHALATION) 2 TIMES DAILY
COMMUNITY
Start: 2023-12-29

## 2024-01-31 NOTE — PROGRESS NOTES
[facility] Progress Note    Subjective: Patient returns to clinic after last being seen on 12/6 for administration of bilateral Synvisc 1 injections.  He reports no significant relief after the Synvisc 1 injections.  To recap, in the past he has received steroid injections in both knees which is only offered 1 week of relief.  He is attempted physical therapy without any significant relief.  He continues to experience anterior knee pain bilaterally with climbing.    More recently however, he accidentally slipped on snow and landed directly onto the left knee.  He reports 1-2/10 intensity pain with walking and 3-4/10 intensity pain with applying pressure to the anterior aspect of the left knee.  He reports that it has been gotten better without any significant disruption of function.    Objective: Patient seated comfortably in the exam chair.  Ambulating independently.  Demonstrates full active knee extension of the left knee and flex to approximately 120 degrees.  No ecchymosis, effusion, redness, warmth of the left knee.    Assessment/Plan: Discussed with the patient that I have low concern for fracture of the left knee at this point in time.  He likely suffered a bone bruise/soft tissue contusion in the process of the fall.  We regards to the patient's chronic bilateral knee pain, he has attempted formal physical therapy, intra-articular steroid injection, intra-articular viscosupplementation injection, and multiple medications without significant relief.  I recommending consultation with one of our sports colleagues for assessment of possible arthroscopic surgery as a possibility or injection of biologic such as PRP as a possibility as well.  The patient expresses understanding of the discussion.  His questions were sought and answered satisfactorily.  He is happy with the plan will follow as advised.      Remigio Fischer PA-C  [facility] Orthopedic [facility]    This note was dictated  using Dragon software.  While it was briefly proofread prior to completion, some grammatical, spelling, and word choice errors due to dictation may still occur.

## 2024-02-09 ENCOUNTER — OFFICE VISIT (OUTPATIENT)
Dept: ORTHOPEDICS CLINIC | Facility: CLINIC | Age: 59
End: 2024-02-09
Payer: MEDICAID

## 2024-02-09 ENCOUNTER — TELEPHONE (OUTPATIENT)
Dept: ORTHOPEDICS CLINIC | Facility: CLINIC | Age: 59
End: 2024-02-09

## 2024-02-09 DIAGNOSIS — M94.262 CHONDROMALACIA OF LEFT KNEE: Primary | ICD-10-CM

## 2024-02-09 DIAGNOSIS — M23.42 LOOSE BODY OF LEFT KNEE: ICD-10-CM

## 2024-02-09 DIAGNOSIS — M17.0 PRIMARY OSTEOARTHRITIS OF BOTH KNEES: ICD-10-CM

## 2024-02-09 DIAGNOSIS — M65.9 SYNOVITIS OF KNEE: ICD-10-CM

## 2024-02-09 NOTE — TELEPHONE ENCOUNTER
Date of Surgery:    3/12/2024    Post Op Appt:  3/18/2024 1040AM    Case ID: [id number]     Notes: Lets please add for March at [facility], thanks!             OR BOOKING SHEET KNEE ARTHROSCOPY  Location: [x] [facility]                    [] [facility]  Name: Devin Urrutia  MRN: [id number]   : 1965  Diagnosis:  [x] Chondromalacia of left knee [M94.262]  Disposition:    [x] Ambulatory  Operative Time Required: 45 minutes ([facility])  Procedure:  Antibiotics: 2 g cefazolin within 60 minutes of surgical incision (3 g if > 120 kg)  Laterality:                  [] RIGHT                  [x] LEFT                          [] BILATERAL  Procedures:                    [x] Knee Arthroscopy                                                   [x] Synovectomy (84596)                    [x] Abrasionplasty (86229)                    [x] Removal of Loose Body (23177)     Injections:                    [] Stem cells from bone marrow aspiration (59670)                                      From:                   [] Left Iliac crest                   [] Right Iliac crest                                      To:                   [] Left knee         [] Right knee                          [] Other      Additional info:   [] PCP Clearance Needed  [] MRSA  [x] Physical Therapy Internal   [] DME Rx  [] Appt with Dr. Sandoval needed  Implants needed: nONE  Positioning Equipment: Supine with Lateral Post

## 2024-02-09 NOTE — PROGRESS NOTES
OR BOOKING SHEET KNEE ARTHROSCOPY  Location: [x] [facility]   [] [facility]  Name: Devin Urrutia  MRN: [id number]   : 1965  Diagnosis:  [x] Chondromalacia of left knee [M94.262]  Disposition:    [x] Ambulatory  Operative Time Required: 45 minutes ([facility])  Procedure:  Antibiotics: 2 g cefazolin within 60 minutes of surgical incision (3 g if > 120 kg)  Laterality: [] RIGHT  [x] LEFT                       [] BILATERAL  Procedures:   [x] Knee Arthroscopy     [x] Synovectomy (42971)   [x] Abrasionplasty (03685)   [x] Removal of Loose Body (38364)    Injections:   [] Stem cells from bone marrow aspiration (56696)    From:  [] Left Iliac crest  [] Right Iliac crest    To:  [] Left knee  [] Right knee  [] Other     Additional info:   [] PCP Clearance Needed  [] MRSA  [x] Physical Therapy Internal   [] DME Rx  [] Appt with Dr. Sandoval needed  Implants needed: nONE  Positioning Equipment: Supine with Lateral Post

## 2024-02-09 NOTE — H&P
Orthopaedic Surgery  [address] [place] [id number]  [phone]     NEW PATIENT VISIT - HISTORY AND PHYSICAL EXAMINATION     Name: Devin Urrutia   MRN: [id number]  Date: 2/9/2024     CC: Bilateral Knee Pain, left greater than right    REFERRED BY: MD Remigio    HPI:   Devin Urrutia is a very pleasant 58 year old male who presents today for evaluation of bilateral knee pain sustained 2022, due to fall on knees.      To Summarize, Patient was last seen by Remigio Knowles; Patient returns to clinic after last being seen on 12/6 for administration of bilateral Synvisc 1 injections.  He reports no significant relief after the Synvisc 1 injections.  To recap, in the past he has received steroid injections in both knees which is only offered 1 week of relief.  He is attempted physical therapy without any significant relief.  He continues to experience anterior knee pain bilaterally with climbing. More recently however, he accidentally slipped on snow and landed directly onto the left knee.     Today, He is in office today for the possible surgical discussion regarding and knee arthroscopy.  Physical therapy has been ineffective in alleviating his symptomology.  Pain levels today are 2-3/10 with stiffness and difficulty walking.        PMH:   Past Medical History:   Diagnosis Date    Allergic rhinitis     Asthma     Hyperlipidemia        PAST SURGICAL HX:  No past surgical history on file.    FAMILY HX:  Family History   Problem Relation Age of Onset    Heart Disorder Father     Hypertension Mother     Heart Disorder Mother        ALLERGIES:  Patient has no known allergies.    MEDICATIONS:   Current Outpatient Medications   Medication Sig Dispense Refill    Fluticasone Propionate  MCG/ACT Inhalation Aerosol Inhale 2 puffs into the lungs 2 (two) times daily.      tamsulosin 0.4 MG Oral Cap Take 1 capsule (0.4 mg total) by mouth every evening. 90 capsule 6    atorvastatin 10 MG Oral Tab Take 1  tablet (10 mg total) by mouth nightly. 90 tablet 1    Fluocinolone Acetonide 0.01 % Otic Oil 2 drops each ear twice daily for 30 days, repeat as necessary 1 each 5    FLOVENT  MCG/ACT Inhalation Aerosol Inhale 2 puffs into the lungs 2 (two) times daily. 1 each 3    montelukast 10 MG Oral Tab Take 1 tablet (10 mg total) by mouth nightly. 90 tablet 1    Meloxicam 15 MG Oral Tab Take 1 tablet (15 mg total) by mouth daily. 30 tablet 1    albuterol (2.5 MG/3ML) 0.083% Inhalation Nebu Soln Take 3 mL (2.5 mg total) by nebulization every 6 (six) hours as needed for Wheezing or Shortness of Breath. 100 each 3    Nebulizer Does not apply Misc Use every 4-6 hours as needed for wheezing, Shortness of breath 1 each 0    pantoprazole 40 MG Oral Tab EC Take 1 tablet (40 mg total) by mouth every morning before breakfast. 90 tablet 1    Respiratory Therapy Supplies (NEBULIZER) Does not apply Device Use every 4-6 hours as needed 1 each 0    Plecanatide (TRULANCE) 3 MG Oral Tab Take 1 tablet by mouth daily. 30 tablet 1    albuterol 108 (90 Base) MCG/ACT Inhalation Aero Soln Inhale 2 puffs into the lungs every 6 (six) hours as needed. 1 each 3    Olopatadine HCl 0.2 % Ophthalmic Solution Apply 1 drop to eye 3 (three) times daily as needed. 1 each 3    Fluocinolone Acetonide Scalp 0.01 % External Oil Place two drops in each ear twice a day. Use for 30 days. Repeat as needed. 118.28 mL 0    fexofenadine 180 MG Oral Tab Take 1 tablet (180 mg total) by mouth daily.      bacitracin 500 UNIT/GM External Ointment Apply to nasal rim with q-tip twice daily as directed for one month, repeat as necessary 28 g 5    Respiratory Therapy Supplies (NEBULIZER) Does not apply Device Please use every 6 hour as needed for Shortness of breath, wheezing 1 Device 0       ROS: A comprehensive 14 point review of systems was performed and was negative aside from the aforementioned per history of present illness.    SOCIAL HX:  Social History     Tobacco  Use    Smoking status: Former    Smokeless tobacco: Former    Tobacco comments:     quit 20 years ago   Substance Use Topics    Alcohol use: Never       PE:   There were no vitals filed for this visit.  Estimated body mass index is 25.24 kg/m² as calculated from the following:    Height as of 1/31/24: 5' 11\" (1.803 m).    Weight as of 1/31/24: 181 lb.    Physical Exam  Constitutional:       Appearance: Normal appearance.   HENT:      Head: Normocephalic and atraumatic.   Eyes:      Extraocular Movements: Extraocular movements intact.   Neck:      Musculoskeletal: Normal range of motion and neck supple.   Cardiovascular:      Pulses: Normal pulses.   Pulmonary:      Effort: Pulmonary effort is normal. No respiratory distress.   Abdominal:      General: There is no distension.   Skin:     General: Skin is warm.      Capillary Refill: Capillary refill takes less than 2 seconds.      Findings: No bruising.   Neurological:      General: No focal deficit present.      Mental Status: Alert.   Psychiatric:         Mood and Affect: Mood normal.     Examination of the left knee demonstrates:   Skin is intact, warm and dry.   Atrophy: none    Effusion: small    Joint line tenderness:  Primarily patellofemoral  Crepitation: none   Alexandrea: Negative   Patellar mobility: normal without apprehension  J-sign: none    ROM: Extension full  Flexion 120 degrees  ACL:  Negative Lachman, Negative Pivot Shift   PCL:  Negative Posterior Drawer  Collateral Ligaments: Stable to Varus and Valgus stress at 0 and 30 degrees  Strength: normal   Hip joint: normal pain-free ROM   Gait:  normal   Leg length: equal and symmetric  Alignment:  neutral     No obvious peripheral edema noted.   Distal neurovascular exam demonstrates normal perfusion, intact sensation to light touch and full strength.     Examination of the contralateral knee demonstrates:  No significant evidence of functional limitations.  Although also having patellofemoral and medial  joint line discomfort.    Radiographic Examination/Diagnostics:  Knee XR personally viewed, independently interpreted and radiology report was reviewed.      PROCEDURE:  MRI KNEE, LEFT (FAJ=38560)     LOCATION:  [facility]       COMPARISON:  None.     INDICATIONS:  G89.29 Chronic pain of left knee M25.562 Chronic pain of left knee S80.02XD Contusion of left knee, subsequent encounter     TECHNIQUE:  Axial, coronal, and sagittal proton density with and without fat saturation images were obtained.     PATIENT STATED HISTORY: (As transcribed by Technologist)  The patient reports a fall five months ago. Now with bilateral medial knee pain.         FINDINGS:    LIGAMENTS:          The ACL, PCL, patellar retinacula, and collateral ligament complexes are intact.  MENISCI:            The medial and lateral menisci are intact without evidence of tear or significant degenerative change.  TENDONS:            The tendinous insertions about the knee are intact without significant tendinosis or tears.  MUSCULATURE:        No evidence of strain, edema, or atrophy.  BONY COMPARTMENTS:  There is no evidence of acute osseous injuries.  There is mild patellofemoral joint osteoarthritis.  There is mild chondromalacia of the femoral tibial compartments.  SYNOVIUM:           There is a mild suprapatellar knee joint effusion.  There is suggestion of 2 ossified intra-articular bodies within the posterior medial joint space, measuring 5 mm and 4 mm.  These are best seen on sagittal fat sat PD images 16 and  17 of series 6.          Impression   CONCLUSION:    1. Mild patellofemoral joint osteoarthritis and mild chondromalacia of the femoral tibial compartments.  Associated mild joint effusion and suggestion of 2 ossified intra-articular bodies within the posterior medial joint space measuring 5 and 4 mm  respectively.  2. No evidence of meniscal injuries.    3. No acute process.          IMPRESSION: Devin Urrutia is a 58 year old male with  Chondromalacia, Primary osteoarthritis of both knees, Synovitis, Loose body of left knee sustained 2022, due to a fall on his knees.  He has had extensive conservative treatment including rehabilitative efforts greater than 3 months with physical therapy.  Intra-articular injections with corticosteroid multiple times as well as a series of Synvisc gel injections.  He continues to have pain and mechanical symptoms in the left knee with intra-articular evidence of loose bodies as well as chondromalacia and knee synovitis.  He is an appropriate candidate for knee arthroscopy to remove the loose bodies and perform a synovectomy to help alleviate functional limitations.  He may additionally benefit from abrasionplasty of articular cartilage wear segments.     PLAN:   We had a detailed discussion outlining the etiology, anatomy, pathophysiology, and natural history of physical finding's. Imaging was reviewed in detail and correlated to a 3-dimensional model of the knee.     I had a lengthy discussion with Devin about the diagnosis and options, both surgical and nonsurgical. I have recommended that we proceed with arthroscopic surgical treatment as we agree that this intervention will likely offer the best opportunity for symptomatic relief and functional recovery. I used the MRI scan and a 3-dimensional knee model to outline his pathology, as well as general surgical principles. We reviewed the risks associated with arthroscopic partial meniscectomy.  In particular I emphasized that the displaced flap component and degenerative portion of the meniscus would be removed as this is dysvascular.  Simultaneously, I will do a diagnostic knee arthroscopy of the whole knee and hope to identify and address any other pathology that may be encountered such as scar tissue, inflammation, cartilage pathology.  In particular we discussed risks that include, a low risk of infection (<1%), potential transient or permanent injury to  nerves with superficial numbness, joint stiffness which may require additional physical therapy, persistent pain/lack of symptomatic improvement, need for future operation, wound complications (rare as incisions are very small), deep vein thrombosis (DVT) and pulmonary embolism (PE).   We discussed the proposed rehabilitation timeline as well as expected postoperative restrictions.  In this regard, I emphasized that there will be no weightbearing or range of motion restrictions post operatively.  Crutches will be provided initially for patient comfort and I will aim to have the patient begin physical therapy on postoperative day 1.  The advantage of this is to begin immediate mobility and range of motion to mitigate pain and encourage reduction of inflammation/swelling.  This will ultimately lead to a quicker postsurgical rehabilitation.  For most patients, this requires a total of 4 to 6 weeks of postsurgical physical therapy to attain full functional status after simple knee arthroscopy.  Devin voiced a good understanding of treatment options, risks and benefits, postoperative instructions, rehabilitation timeline, and restrictions. He was given the opportunity to ask questions, which were all answered to the best of my ability and to his satisfaction. Devin will work with my office to arrange a time for surgery and obtain any medical clearance information necessary. My pre-operative information packet, which details the process and answers many FAQ's will be provided. He was encouraged to call the office with any further questions or concerns.    I spent 45 minutes in preparation to see the patient, counseling/education of relevant pathology, discussing imaging results, ordering therapy  intervention, care coordination, surgical counseling and documentation into the electronic medical record.      FOLLOW-UP:  Post-Operative Visit, POD 8      Jennifer Sandoval MD  Knee, Shoulder, & Elbow Surgery / Sports Medicine  Specialist  Orthopaedic Surgery  [address] [place] [id number]   [email or web address]  [email or web address]  t: [phone]  o: [phone]  f: [phone]    This note was dictated using Dragon software.  While it was briefly proofread prior to completion, some grammatical, spelling, and word choice errors due to dictation may still occur.

## 2024-02-09 NOTE — TELEPHONE ENCOUNTER
CALLED PATIENT AND WE SCHEDULED SURGERY, POST OP AND WENT OVER PRE OPERATIVE PROCEDURES. ALL QUESTIONS ANSWERED

## 2024-02-13 ENCOUNTER — TELEPHONE (OUTPATIENT)
Dept: ORTHOPEDICS CLINIC | Facility: CLINIC | Age: 59
End: 2024-02-13

## 2024-02-13 ENCOUNTER — TELEPHONE (OUTPATIENT)
Dept: SURGERY | Facility: CLINIC | Age: 59
End: 2024-02-13

## 2024-02-13 DIAGNOSIS — M94.262 CHONDROMALACIA OF LEFT KNEE: Primary | ICD-10-CM

## 2024-02-13 NOTE — TELEPHONE ENCOUNTER
Pt has been taking Tamsulosin rx since December - had a lot of side effects - loss of voice, fatigue, weakness,dizzy - he stopped rx and is asking to talk to Dr about another rx

## 2024-02-15 RX ORDER — SILODOSIN 4 MG/1
4 CAPSULE ORAL DAILY
Qty: 90 CAPSULE | Refills: 3 | Status: SHIPPED | OUTPATIENT
Start: 2024-02-15

## 2024-02-15 NOTE — TELEPHONE ENCOUNTER
Tamsulosin giving lightheaded dizziness fatigue. Advised to try silodosin. If ongoing side effects, can stop and discuss at next OV with MB.

## 2024-02-20 ENCOUNTER — APPOINTMENT (OUTPATIENT)
Dept: ADMINISTRATIVE | Facility: HOSPITAL | Age: 59
End: 2024-02-20
Payer: MEDICAID

## 2024-02-21 ENCOUNTER — OFFICE VISIT (OUTPATIENT)
Facility: LOCATION | Age: 59
End: 2024-02-21
Payer: MEDICAID

## 2024-02-21 DIAGNOSIS — R04.0 EPISTAXIS: Primary | ICD-10-CM

## 2024-02-21 PROCEDURE — 99204 OFFICE O/P NEW MOD 45 MIN: CPT | Performed by: OTOLARYNGOLOGY

## 2024-02-21 PROCEDURE — 30901 CONTROL OF NOSEBLEED: CPT | Performed by: OTOLARYNGOLOGY

## 2024-02-21 NOTE — PROGRESS NOTES
[facility], [facility]    Report of Consultation    Date of Consult: 2/21/2024     Reason for Consultation:   Left-sided nosebleed.    History of Present Illness:   Patient is a 58 year old male who is being seen for intermittent left-sided nosebleeds.  In the past he has been told to use ointment to control it.  No prior history nasal surgery nasal trauma.  No history of bleeding disorders.    Past Medical History  Past Medical History:   Diagnosis Date    Allergic rhinitis     Asthma (HCC)     High cholesterol     Hyperlipidemia     Visual impairment        Past Surgical History  Past Surgical History:   Procedure Laterality Date    COLONOSCOPY         Family History  Family History   Problem Relation Age of Onset    Heart Disorder Father     Hypertension Mother     Heart Disorder Mother        Social History  Pediatric History   Patient Parents    Not on file     Other Topics Concern    Caffeine Concern Yes     Comment: occassionally    Exercise Yes     Comment: every other day    Seat Belt Not Asked    Special Diet Not Asked    Stress Concern Not Asked    Weight Concern Not Asked     Service Not Asked    Blood Transfusions Not Asked    Occupational Exposure Not Asked    Hobby Hazards Not Asked    Sleep Concern Not Asked    Back Care Not Asked    Bike Helmet Not Asked    Self-Exams Not Asked   Social History Narrative    Not on file           Current Medications:  Current Outpatient Medications   Medication Sig Dispense Refill    atorvastatin 10 MG Oral Tab Take 1 tablet (10 mg total) by mouth nightly. 90 tablet 1    Fluocinolone Acetonide 0.01 % Otic Oil 2 drops each ear twice daily for 30 days, repeat as necessary 1 each 5    FLOVENT  MCG/ACT Inhalation Aerosol Inhale 2 puffs into the lungs 2 (two) times daily. 1 each 3    montelukast 10 MG Oral Tab Take 1 tablet (10 mg total) by mouth nightly. 90 tablet 1    albuterol (2.5 MG/3ML) 0.083% Inhalation Nebu Soln  Take 3 mL (2.5 mg total) by nebulization every 6 (six) hours as needed for Wheezing or Shortness of Breath. 100 each 3    Respiratory Therapy Supplies (NEBULIZER) Does not apply Device Use every 4-6 hours as needed 1 each 0    albuterol 108 (90 Base) MCG/ACT Inhalation Aero Soln Inhale 2 puffs into the lungs every 6 (six) hours as needed. 1 each 3    Olopatadine HCl 0.2 % Ophthalmic Solution Apply 1 drop to eye 3 (three) times daily as needed. 1 each 3    Respiratory Therapy Supplies (NEBULIZER) Does not apply Device Please use every 6 hour as needed for Shortness of breath, wheezing 1 Device 0       Allergies  No Known Allergies    Review of Systems:   A comprehensive review of systems was negative.    Physical Exam:   There were no vitals taken for this visit.         Constitutional Normal Overall appearance - Normal.   Psychiatric Normal Orientation - Oriented to time, place, person & situation. Appropriate mood and affect.   Head/Face Normal Facial features -- Normal. Skull - Normal.   Eyes Normal Pupils equal ,round ,react to light and accomidate   Ears Normal External Ear Right: Normal, Left: Normal. Canal - Right: Normal, Left: Normal. TM - Right: Normal, Left: Normal.   Nose Normal External Nose, Normal, patient was decongested with Afrin and lidocaine.  Examination was very telescope found no active bleeding on the right.  No polyps are present.  Left side shows excoriation and superior to Riley box area.   Mouth/Throat Normal Lips/teeth/gums - Normal. Tonsils - Normal. Oropharynx - Normal.   Neck Exam Normal Inspection - Normal. Palpation - Normal. Parotid gland - Normal. Thyroid gland - Normal.   Neurological Normal Memory - Normal. Cranial nerves - Cranial nerves II through XII grossly intact.   Nasopharynx Normal  Normal        Skin Normal Inspection - Normal.        Lymph Detail Normal Submental. Submandibular. Anterior cervical. Posterior cervical. Supraclavicular.       Results:     Laboratory  Data:  Lab Results   Component Value Date    WBC 7.2 01/23/2024    HGB 15.2 01/23/2024    HCT 44.3 01/23/2024    .0 01/23/2024    CREATSERUM 0.96 01/23/2024    BUN 20 01/23/2024     01/23/2024    K 4.1 01/23/2024     01/23/2024    CO2 29.0 01/23/2024     (H) 01/23/2024    CA 8.8 01/23/2024    ALB 3.8 01/23/2024    ALKPHO 88 01/23/2024    TP 7.2 01/23/2024    AST 27 01/23/2024    ALT  01/23/2024      Comment:      Due to  backorder we are temporarily unable to offer hospital-based ALT testing at [facility].   If urgently needed, please order ALT test code [id number].   The new order will need a new venipuncture and will be sent to [facility] for testing.   The expected turnaround time will be within 24 hours.     T4F 1.1 01/23/2024    TSH 2.430 01/23/2024    DDIMER 0.32 04/01/2021    ESRML 9 09/24/2019    TROP <0.045 04/02/2021         Imaging:  No results found.      Impression:   Epistaxis.    Recommendations:    [id number]  After adequate decongestion the patient underwent cautery of the left septal region.  Patient was observed for 15 minutes no further bleeding was seen.  He is to apply bacitracin or Neosporin twice a day for the next 10 days.  If further bleeding occurs he will return.    Thank you for allowing me to participate in the care of your patient.      Hima Salazar MD  2/21/2024

## 2024-02-23 ENCOUNTER — TELEPHONE (OUTPATIENT)
Dept: PHYSICAL THERAPY | Facility: HOSPITAL | Age: 59
End: 2024-02-23

## 2024-02-27 ENCOUNTER — TELEPHONE (OUTPATIENT)
Dept: PHYSICAL THERAPY | Facility: HOSPITAL | Age: 59
End: 2024-02-27

## 2024-03-05 ENCOUNTER — TELEPHONE (OUTPATIENT)
Dept: PHYSICAL THERAPY | Facility: HOSPITAL | Age: 59
End: 2024-03-05

## 2024-03-11 RX ORDER — TRAMADOL HYDROCHLORIDE 50 MG/1
TABLET ORAL
Qty: 20 TABLET | Refills: 1 | Status: SHIPPED | OUTPATIENT
Start: 2024-03-11

## 2024-03-11 RX ORDER — MELOXICAM 15 MG/1
15 TABLET ORAL DAILY
Qty: 14 TABLET | Refills: 1 | Status: SHIPPED | OUTPATIENT
Start: 2024-03-11

## 2024-03-11 RX ORDER — ONDANSETRON 4 MG/1
TABLET, FILM COATED ORAL
Qty: 8 TABLET | Refills: 0 | Status: SHIPPED | OUTPATIENT
Start: 2024-03-11

## 2024-03-11 NOTE — DISCHARGE INSTRUCTIONS
Knee Arthroscopy   Postoperative Guidelines      This document will help you plan for your post-operative recovery course following surgery. Please read and retain this information for future reference. Many of the questions you may have later can be answered by referring to this information.      DIET   Begin with clear liquids and light foods (jellos, soups, etc.).   Progress to your normal diet if you are not experiencing nausea.       WOUND CARE   Please keep the ACE wrap on for the first 2-3 days, you may remove the ACE wrap for ice therapy.  Underneath the ACE wrap are waterproof bandages, please keep these in place until your first post-op appointment with Dr. Sandoval.  Under the waterproof bandages is Dermabond, this is a surgical glue and tape that is used in conjunction with absorbable stitches to close the incision.   Please do not touch the Dermabond or place any ointments lotions or creams directly over the incisions.  You may shower after removing the ACE wrap by placing Saran wrap around the leg and covering the bandages.  NO soaking of the operative leg (ie: bath or pool) is allowed until 6 weeks after surgery.     CRUTCHES   Crutches are to aid your progression to walking.   You may discontinue the crutches as soon as you are comfortable with full weight on the leg (usually 1-3 days). Your physical therapist may guide you with this process.     PAIN MANAGEMENT  Local numbing medications are injected into the wound around the knee at the time of surgery. These will wear off within 8-12 hours and it is not uncommon for you to encounter more pain on the first or second day after surgery when swelling peaks.   Do not drive a car or operate machinery while taking the narcotic medication.   Please avoid alcohol use while taking the narcotic pain medication.     NON-NARCOTIC PAIN MEDICATIONS   Extra-Strength Tylenol (Acetaminophen) 500mg Tablets (available over the counter)  Indication: pain, non-narcotic  pain reliever  Use: Take 1-2 tablets every 6 hours.  Do not exceed 8 tablets in a 24-hour period.  Mobic (Meloxicam) 15mg Tablets (Prescription sent to pharmacy)  Indication: pain and anti-inflammatory, non-narcotic pain reliever  Use: Take 1 tablets daily with meals for the first 14 days after surgery.  Side Effects: upset stomach, acid reflux.  If this occurs, stop the medication.    NARCOTIC PAIN MEDICATION  Tramadol is a prescription pain medication that should only be used if adequate pain control is not achieved with combination of ice, extra-strength Tylenol and Aleve as outlined above.   Side effects include constipation, nausea, drowsiness, dry mouth, itchiness.  Use a 0-10 pain scale to decide how much medication to take:                                Pain 0-4/10: No tramadol necessary                                Pain 5-7/10: Take one tablet                                 Pain 8-10/10: Take two tablets   Tramadol 50mg tablet  Indication: pain 5/10 or greater.  Narcotic pain medication.  Use: 1-2 tabs every 4-6 hours as needed for pain.  Do not exceed more than 10 tabs in any 24-hour time period unless otherwise directed.    MEDICATIONS TO MANAGE SIDE EFFECTS  Narcotics may cause nausea and constipation. Bowel regimen is recommended.  Colace (Docusate) 100 mg - available OTC  Indication:  constipation, stool softener.  Take consistently while on narcotics.  Use:  Take 1 pill three times per day while you are taking narcotics.    Senokot (Senna) 8.6 mg - available OTC  Indication: constipation, stool laxative.  Take consistently while on narcotics.   Use: Take 2 pills at bedtime.  Increase to 2 pills twice daily if no bowel movement by post-surgery day two.    Zofran (Ondansetron ODT) 4 mg- Prescription sent to pharmacy  Indication: nausea.  Take as needed.  Use: Take 1 pill AS NEEDED every 8 hours, do not exceed 3 pills in 24 hours      ACTIVITY   Raise the operative leg to chest level whenever  possible to decrease swelling.   Do not place pillows under knees (i.e. do not maintain knee in a flexed or bent position), but rather place pillows under the foot/ankle.   Use crutches to assist with walking for the first 24-48 hours  Do not engage in activities which increase knee pain/swelling (prolonged periods of standing or walking) for the first 2 weeks following surgery.   Avoid long periods of sitting (without leg elevated) or long distance traveling for 2 weeks.   NO driving until instructed otherwise by physician.   You may return to sedentary work or school 1-2 days after surgery if feeling well.    You will not be needing a knee brace      ICE THERAPY   Icing is very important in the initial post-operative period and should begin immediately after surgery.   Use ice packs / bags for 30-45 minutes every 2 hours daily until there is no swelling and pain is relieved - remember to keep leg elevated to level of chest while icing. Care should be taken with icing to avoid frostbite to the skin.       EXERCISES   Begin exercises 24 hours after surgery, including knee extension, quad sets, straight leg raises, and active flexion / extension unless otherwise instructed. (Please see handout)  Discomfort and knee stiffness are normal for a few days following surgery. It is safe to bend your knee in a non-weightbearing position when performing exercises unless otherwise instructed.   Complete exercises 3-4 times daily until your first post-operative visit - your motion goals are to have complete extension (straightening) and 90 degrees of flexion (bending) at your first post- operative appointment unless otherwise instructed.   Perform ankle pumps continuously throughout the day to reduce the risk of developing a blood clot in your calf.   Formal physical therapy (PT) typically begins as soon as possible, ideally 1-3 days after surgery. A prescription and protocol will be provided prior to surgery.             EMERGENCIES**   Contact Dr. Sandoval’s Team via [employer] or [phone] if any of the following are present:     Painful swelling or numbness (note that some swelling and numbness is normal).   Unrelenting pain.  Fever (over 101° - it is normal to have a low-grade fever for the first day or two following surgery) or chills.   Redness around incisions.   Color change in foot or ankle.   Continuous drainage or bleeding from incision (a small amount of drainage is expected).   Difficulty breathing.   Excessive nausea/vomiting   Severe calf pain.   If you have an emergency after office hours or on the weekend, contact the office at [phone] and you will be connected to our pager service. This will connect you with the Physician on call.   If you have an emergency that requires immediate attention proceed to the nearest emergency room.       FOLLOW-UP CARE/QUESTIONS   Your first post-operative appointment will be 7-10 days following surgery for wound evaluation and to answer any questions you have regarding the procedure.   Typically, the first physical therapy appointment is made for 1-3 days following surgery. This prescription will be communicated prior to surgery.  If you have any further questions please contact Dr. Sandoval’s team directly.

## 2024-03-12 ENCOUNTER — ANESTHESIA EVENT (OUTPATIENT)
Dept: SURGERY | Facility: HOSPITAL | Age: 59
End: 2024-03-12
Payer: MEDICAID

## 2024-03-12 ENCOUNTER — ANESTHESIA (OUTPATIENT)
Dept: SURGERY | Facility: HOSPITAL | Age: 59
End: 2024-03-12
Payer: MEDICAID

## 2024-03-12 ENCOUNTER — HOSPITAL ENCOUNTER (OUTPATIENT)
Facility: HOSPITAL | Age: 59
Setting detail: HOSPITAL OUTPATIENT SURGERY
Discharge: HOME | End: 2024-03-12
Attending: ORTHOPAEDIC SURGERY | Admitting: ORTHOPAEDIC SURGERY
Payer: MEDICAID

## 2024-03-12 VITALS
OXYGEN SATURATION: 97 % | TEMPERATURE: 97 F | RESPIRATION RATE: 12 BRPM | HEIGHT: 71 IN | BODY MASS INDEX: 25.93 KG/M2 | HEART RATE: 78 BPM | WEIGHT: 185.19 LBS | DIASTOLIC BLOOD PRESSURE: 84 MMHG | SYSTOLIC BLOOD PRESSURE: 115 MMHG

## 2024-03-12 DIAGNOSIS — Z98.890 S/P ARTHROSCOPY OF LEFT KNEE: Primary | ICD-10-CM

## 2024-03-12 PROCEDURE — 0SBC4ZZ EXCISION OF RIGHT KNEE JOINT, PERCUTANEOUS ENDOSCOPIC APPROACH: ICD-10-PCS | Performed by: ORTHOPAEDIC SURGERY

## 2024-03-12 RX ORDER — HYDROMORPHONE HYDROCHLORIDE 1 MG/ML
0.6 INJECTION, SOLUTION INTRAMUSCULAR; INTRAVENOUS; SUBCUTANEOUS EVERY 5 MIN PRN
Status: DISCONTINUED | OUTPATIENT
Start: 2024-03-12 | End: 2024-03-12

## 2024-03-12 RX ORDER — ACETAMINOPHEN 500 MG
1000 TABLET ORAL ONCE
Status: DISCONTINUED | OUTPATIENT
Start: 2024-03-12 | End: 2024-03-12 | Stop reason: HOSPADM

## 2024-03-12 RX ORDER — SODIUM CHLORIDE, SODIUM LACTATE, POTASSIUM CHLORIDE, CALCIUM CHLORIDE 600; 310; 30; 20 MG/100ML; MG/100ML; MG/100ML; MG/100ML
INJECTION, SOLUTION INTRAVENOUS CONTINUOUS
Status: DISCONTINUED | OUTPATIENT
Start: 2024-03-12 | End: 2024-03-12

## 2024-03-12 RX ORDER — NALOXONE HYDROCHLORIDE 0.4 MG/ML
80 INJECTION, SOLUTION INTRAMUSCULAR; INTRAVENOUS; SUBCUTANEOUS AS NEEDED
Status: DISCONTINUED | OUTPATIENT
Start: 2024-03-12 | End: 2024-03-12

## 2024-03-12 RX ORDER — EPHEDRINE SULFATE 50 MG/ML
INJECTION INTRAVENOUS AS NEEDED
Status: DISCONTINUED | OUTPATIENT
Start: 2024-03-12 | End: 2024-03-12 | Stop reason: SURG

## 2024-03-12 RX ORDER — LABETALOL HYDROCHLORIDE 5 MG/ML
5 INJECTION, SOLUTION INTRAVENOUS EVERY 5 MIN PRN
Status: DISCONTINUED | OUTPATIENT
Start: 2024-03-12 | End: 2024-03-12

## 2024-03-12 RX ORDER — CETIRIZINE HYDROCHLORIDE 5 MG/1
5 TABLET ORAL DAILY
COMMUNITY

## 2024-03-12 RX ORDER — LIDOCAINE HYDROCHLORIDE 10 MG/ML
INJECTION, SOLUTION EPIDURAL; INFILTRATION; INTRACAUDAL; PERINEURAL AS NEEDED
Status: DISCONTINUED | OUTPATIENT
Start: 2024-03-12 | End: 2024-03-12 | Stop reason: SURG

## 2024-03-12 RX ORDER — HYDROCODONE BITARTRATE AND ACETAMINOPHEN 10; 325 MG/1; MG/1
1 TABLET ORAL ONCE AS NEEDED
Status: DISCONTINUED | OUTPATIENT
Start: 2024-03-12 | End: 2024-03-12

## 2024-03-12 RX ORDER — MEPERIDINE HYDROCHLORIDE 25 MG/ML
12.5 INJECTION INTRAMUSCULAR; INTRAVENOUS; SUBCUTANEOUS AS NEEDED
Status: DISCONTINUED | OUTPATIENT
Start: 2024-03-12 | End: 2024-03-12

## 2024-03-12 RX ORDER — ACETAMINOPHEN 500 MG
1000 TABLET ORAL ONCE AS NEEDED
Status: DISCONTINUED | OUTPATIENT
Start: 2024-03-12 | End: 2024-03-12

## 2024-03-12 RX ORDER — BUPIVACAINE HYDROCHLORIDE AND EPINEPHRINE 5; 5 MG/ML; UG/ML
INJECTION, SOLUTION EPIDURAL; INTRACAUDAL; PERINEURAL AS NEEDED
Status: DISCONTINUED | OUTPATIENT
Start: 2024-03-12 | End: 2024-03-12 | Stop reason: HOSPADM

## 2024-03-12 RX ORDER — ALBUTEROL SULFATE 2.5 MG/3ML
2.5 SOLUTION RESPIRATORY (INHALATION) AS NEEDED
Status: DISCONTINUED | OUTPATIENT
Start: 2024-03-12 | End: 2024-03-12

## 2024-03-12 RX ORDER — ONDANSETRON 2 MG/ML
INJECTION INTRAMUSCULAR; INTRAVENOUS
Status: COMPLETED
Start: 2024-03-12 | End: 2024-03-12

## 2024-03-12 RX ORDER — HYDROMORPHONE HYDROCHLORIDE 1 MG/ML
0.4 INJECTION, SOLUTION INTRAMUSCULAR; INTRAVENOUS; SUBCUTANEOUS EVERY 5 MIN PRN
Status: DISCONTINUED | OUTPATIENT
Start: 2024-03-12 | End: 2024-03-12

## 2024-03-12 RX ORDER — KETOROLAC TROMETHAMINE 30 MG/ML
INJECTION, SOLUTION INTRAMUSCULAR; INTRAVENOUS AS NEEDED
Status: DISCONTINUED | OUTPATIENT
Start: 2024-03-12 | End: 2024-03-12 | Stop reason: SURG

## 2024-03-12 RX ORDER — MIDAZOLAM HYDROCHLORIDE 1 MG/ML
1 INJECTION INTRAMUSCULAR; INTRAVENOUS EVERY 5 MIN PRN
Status: DISCONTINUED | OUTPATIENT
Start: 2024-03-12 | End: 2024-03-12

## 2024-03-12 RX ORDER — HYDROCODONE BITARTRATE AND ACETAMINOPHEN 10; 325 MG/1; MG/1
2 TABLET ORAL ONCE AS NEEDED
Status: DISCONTINUED | OUTPATIENT
Start: 2024-03-12 | End: 2024-03-12

## 2024-03-12 RX ORDER — HYDROMORPHONE HYDROCHLORIDE 1 MG/ML
0.2 INJECTION, SOLUTION INTRAMUSCULAR; INTRAVENOUS; SUBCUTANEOUS EVERY 5 MIN PRN
Status: DISCONTINUED | OUTPATIENT
Start: 2024-03-12 | End: 2024-03-12

## 2024-03-12 RX ORDER — DEXAMETHASONE SODIUM PHOSPHATE 4 MG/ML
VIAL (ML) INJECTION AS NEEDED
Status: DISCONTINUED | OUTPATIENT
Start: 2024-03-12 | End: 2024-03-12 | Stop reason: SURG

## 2024-03-12 RX ORDER — SCOLOPAMINE TRANSDERMAL SYSTEM 1 MG/1
1 PATCH, EXTENDED RELEASE TRANSDERMAL ONCE
Status: DISCONTINUED | OUTPATIENT
Start: 2024-03-12 | End: 2024-03-12 | Stop reason: HOSPADM

## 2024-03-12 RX ORDER — PROCHLORPERAZINE EDISYLATE 5 MG/ML
5 INJECTION INTRAMUSCULAR; INTRAVENOUS EVERY 8 HOURS PRN
Status: DISCONTINUED | OUTPATIENT
Start: 2024-03-12 | End: 2024-03-12

## 2024-03-12 RX ORDER — CEFAZOLIN SODIUM/WATER 2 G/20 ML
2 SYRINGE (ML) INTRAVENOUS ONCE
Status: COMPLETED | OUTPATIENT
Start: 2024-03-12 | End: 2024-03-12

## 2024-03-12 RX ORDER — ONDANSETRON 2 MG/ML
INJECTION INTRAMUSCULAR; INTRAVENOUS AS NEEDED
Status: DISCONTINUED | OUTPATIENT
Start: 2024-03-12 | End: 2024-03-12 | Stop reason: SURG

## 2024-03-12 RX ORDER — ONDANSETRON 2 MG/ML
4 INJECTION INTRAMUSCULAR; INTRAVENOUS EVERY 6 HOURS PRN
Status: DISCONTINUED | OUTPATIENT
Start: 2024-03-12 | End: 2024-03-12

## 2024-03-12 RX ORDER — DIPHENHYDRAMINE HYDROCHLORIDE 50 MG/ML
12.5 INJECTION INTRAMUSCULAR; INTRAVENOUS AS NEEDED
Status: DISCONTINUED | OUTPATIENT
Start: 2024-03-12 | End: 2024-03-12

## 2024-03-12 RX ADMIN — LIDOCAINE HYDROCHLORIDE 50 MG: 10 INJECTION, SOLUTION EPIDURAL; INFILTRATION; INTRACAUDAL; PERINEURAL at 14:29:00

## 2024-03-12 RX ADMIN — DEXAMETHASONE SODIUM PHOSPHATE 8 MG: 4 MG/ML VIAL (ML) INJECTION at 14:32:00

## 2024-03-12 RX ADMIN — CEFAZOLIN SODIUM/WATER 2 G: 2 G/20 ML SYRINGE (ML) INTRAVENOUS at 14:33:00

## 2024-03-12 RX ADMIN — SODIUM CHLORIDE, SODIUM LACTATE, POTASSIUM CHLORIDE, CALCIUM CHLORIDE: 600; 310; 30; 20 INJECTION, SOLUTION INTRAVENOUS at 15:18:00

## 2024-03-12 RX ADMIN — SODIUM CHLORIDE, SODIUM LACTATE, POTASSIUM CHLORIDE, CALCIUM CHLORIDE: 600; 310; 30; 20 INJECTION, SOLUTION INTRAVENOUS at 14:24:00

## 2024-03-12 RX ADMIN — EPHEDRINE SULFATE 10 MG: 50 INJECTION INTRAVENOUS at 14:49:00

## 2024-03-12 RX ADMIN — KETOROLAC TROMETHAMINE 30 MG: 30 INJECTION, SOLUTION INTRAMUSCULAR; INTRAVENOUS at 15:14:00

## 2024-03-12 RX ADMIN — ONDANSETRON 4 MG: 2 INJECTION INTRAMUSCULAR; INTRAVENOUS at 15:18:00

## 2024-03-12 NOTE — ANESTHESIA PROCEDURE NOTES
Airway  Date/Time: 3/12/2024 2:30 PM  Urgency: elective      General Information and Staff    Patient location during procedure: OR  Anesthesiologist: Jaron Wright MD  Performed: anesthesiologist   Performed by: Jaron Wright MD  Authorized by: Jaron Wright MD      Indications and Patient Condition  Indications for airway management: anesthesia  Sedation level: deep  Preoxygenated: yes  Patient position: sniffing  Mask difficulty assessment: 1 - vent by mask    Final Airway Details  Final airway type: supraglottic airway      Successful airway: classic  Size 3       Number of attempts at approach: 1

## 2024-03-12 NOTE — ANESTHESIA PREPROCEDURE EVALUATION
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  Temp: 98.3 °F (36.8 °C)  Pulse: 66  Resp: 16  BP: 111/72  SpO2: 95 %  Body mass index is 25.83 kg/m².    Current medications reviewed.  Hospital Medications:   [MAR Hold] acetaminophen (Tylenol Extra Strength) tab 1,000 mg  1,000 mg Oral Once    [MAR Hold] scopolamine (Transderm-Scop) 1 MG/3DAYS patch 1 patch  1 patch Transdermal Once    lactated ringers infusion   Intravenous Continuous    ceFAZolin (Ancef) 2 g in 20mL IV syringe premix  2 g Intravenous Once       Outpatient Medications:     Medications Prior to Admission   Medication Sig Dispense Refill Last Dose    cetirizine 5 MG Oral Tab Take 1 tablet (5 mg total) by mouth daily.   3/8/2024    atorvastatin 10 MG Oral Tab Take 1 tablet (10 mg total) by mouth nightly. 90 tablet 1 3/11/2024    FLOVENT  MCG/ACT Inhalation Aerosol Inhale 2 puffs into the lungs 2 (two) times daily. 1 each 3 3/8/2024    montelukast 10 MG Oral Tab Take 1 tablet (10 mg total) by mouth nightly. 90 tablet 1 3/9/2024    albuterol 108 (90 Base) MCG/ACT Inhalation Aero Soln Inhale 2 puffs into the lungs every 6 (six) hours as needed. 1 each 3 3/8/2024    silodosin 4 MG Oral Cap Take 1 capsule (4 mg total) by mouth daily. 90 capsule 3     Fluocinolone Acetonide 0.01 % Otic Oil 2 drops each ear twice daily for 30 days, repeat as necessary 1 each 5 More than a month    albuterol (2.5 MG/3ML) 0.083% Inhalation Nebu Soln Take 3 mL (2.5 mg total) by nebulization every 6 (six) hours as needed for Wheezing or Shortness of Breath. 100 each 3 More than a month    Respiratory Therapy Supplies (NEBULIZER) Does not apply Device  Use every 4-6 hours as needed 1 each 0     Olopatadine HCl 0.2 % Ophthalmic Solution Apply 1 drop to eye 3 (three) times daily as needed. 1 each 3 More than a month    Respiratory Therapy Supplies (NEBULIZER) Does not apply Device Please use every 6 hour as needed for Shortness of breath, wheezing 1 Device 0        Allergies: Patient has no known allergies.      Anesthesia Evaluation    Patient summary reviewed.    Anesthetic Complications  (-) history of anesthetic complications         GI/Hepatic/Renal      (-) GERD                           Cardiovascular        Exercise tolerance: good     MET: >4                        (-) dysrhythmias                   Endo/Other           (+) hypothyroidism                       Pulmonary      (+) asthma         (-) shortness of breath            Neuro/Psych                                      Past Surgical History:   Procedure Laterality Date    COLONOSCOPY       Social History     Socioeconomic History    Marital status:    Tobacco Use    Smoking status: Former    Smokeless tobacco: Former    Tobacco comments:     quit 20 years ago   Vaping Use    Vaping Use: Never used   Substance and Sexual Activity    Alcohol use: Never    Drug use: Never   Other Topics Concern    Caffeine Concern Yes     Comment: occassionally    Exercise Yes     Comment: every other day     History   Drug Use Unknown     Available pre-op labs reviewed.  Lab Results   Component Value Date    WBC 7.2 01/23/2024    RBC 5.01 01/23/2024    HGB 15.2 01/23/2024    HCT 44.3 01/23/2024    MCV 88.4 01/23/2024    MCH 30.3 01/23/2024    MCHC 34.3 01/23/2024    RDW 12.9 01/23/2024    .0 01/23/2024     Lab Results   Component Value Date     01/23/2024    K 4.1 01/23/2024     01/23/2024    CO2 29.0 01/23/2024    BUN 20 01/23/2024    CREATSERUM 0.96 01/23/2024     (H) 01/23/2024    CA 8.8 01/23/2024            Airway      Mallampati: II  Mouth opening: 3 FB  TM distance: 4 - 6  cm  Neck ROM: full Cardiovascular      Rhythm: regular  Rate: normal     Dental             Pulmonary      Breath sounds clear to auscultation bilaterally.               Other findings              ASA: 2   Plan: general  NPO status verified and patient meets guidelines.    Post-procedure pain management plan discussed with surgeon and patient.      Plan/risks discussed with: patient                Present on Admission:  **None**

## 2024-03-12 NOTE — OPERATIVE REPORT
OPERATIVE REPORT  ATTENDING SURGEON: LAURA MAC MD  ASSISTANT(S): Andi Medina (AJ)  STATEMENT OF MEDICAL NECESSITY  The aid of assistant Andi Medina (AJ) was needed for patient positioning, preparation, drape, retraction,  wound closure, dressing application and critical portions of the procedure.   PRELIMINARY DIAGNOSIS:  1.  Left Patellofemoral and Medial Femoral Chondromalacia     POSTOPERATIVE DIAGNOSIS:  1.  Left Patellofemoral and Medial Femoral Chondromalacia  2.  Left Synovitis involving medial, lateral and patellofemoral compartments    THE OPERATION:  1.  Left Knee Arthroscopy, Chondroplasty of Patella, Trochlea, and Medial femoral condyle (08543)   2.  Left Knee arthroscopic extensive debridement involving multiple compartments (57822)      ANESTHESIA: General Endotracheal  ANESTHESIOLOGIST:  MD Adriana  ANTIBIOTICS: Cefazolin 2g within 60 minutes of surgical incision.    IMPLANTS:   None  PATHOLOGY/CULTURES: None  ESTIMATED BLOOD LOSS: Blood Output: 5 mL (3/12/2024  3:17 PM)    DRAINS: None  COMPLICATIONS: No intraoperative nor immediate postoperative complications noted.  COUNTS: All sponge and needle counts were correct at the conclusion of the procedure.  TOURNIQUET TIME: Not Applicable  OPERATIVE FINDINGS:  Evidence of chondromalacia overlying the lateral patellar facet, grade 2 versus grade 3 changes managed with chondroplasty to stable margin using shaver and Coblation device.  Evidence of near complete trochlear articular cartilage wear grade 3/grade 4 changes approximately 15 x 20 mm area segment.  Managed with debridement and chondroplasty to a stable margin.  Primary articular cartilage effacement overlying the medial femoral condyle, particularly in the weightbearing region with focal area of full-thickness articular cartilage loss, grade 4 and primary changes grade 2/grade 3 throughout the remainder.  Managed with again chondroplasty.  Evidence of inflammatory changes and  synovitis in the medial, lateral and patellofemoral compartments managed with synovectomy to a stable margin using Coblation device primarily to maintain hemostasis.    Indications:  Devin is a 58 year old male with history, physical examination, and imaging findings consistent with degenerative changes in the knee and persistent pain.  Unfortunately, he is not an appropriate candidate for knee arthroplasty and elected to proceed with knee arthroscopy as a treatment modality.  Physical therapy greater than 3 months, intra-articular corticosteroid and ketorolac injection, activity modification, and anti-inflammatory medications without successful symptomatic resolution.  Operative and nonoperative options were discussed with him in my office and we ultimately agreed that surgery would provide the highest likelihood of symptomatic relief and functional improvement.  The risks and benefits of surgery as well as the postoperative rehabilitation plan were reviewed. He voiced a good understanding of treatment options, risks and benefits, and alternatives to surgery. He was given the opportunity to ask questions, which were all answered to the best of my ability and to his satisfaction. Devin wished to proceed.   On the day of surgery, the Devin was seen in the preoperative area.  I confirmed his identity by name and birthday. We reviewed and confirmed the surgical consent including laterality.  The surgical site was marked with my initials.  We re-reviewed the risks and benefits of the procedure and I answered all additional questions to his satisfaction.      OPERATIVE REPORT:   Devin was taken to the operating room in stable condition.    A clear 1010 drape x 2 was applied to the upper thigh. An adjustable lateral post was utilized. The extremity underwent a prescrub with chlorhexidine and alcohol followed by a chlorhexidine formal preparation.  A preoperative timeout was performed confirming the correct surgical  site, procedure, and preoperative imaging was reviewed.      Exam under anesthesia demonstrated a Stable knee with normal range of motion.      Diagnostic knee arthroscopy was performed with standard anterolateral visualization portal was made utilizing a 15 blade, and an arthroscope was introduced. The medial working portal was established under spinal needle localization and diagnostic arthroscopy was commenced.      Diagnostic arthroscopy revealed:      Suprapatellar No evidence of loose bodies   Patellofemoral Evidence of chondromalacia overlying the lateral patellar facet, grade 2 versus grade 3 changes managed with chondroplasty to stable margin using shaver and Coblation device.  Evidence of near complete trochlear articular cartilage wear grade 3/grade 4 changes approximately 15 x 20 mm area segment.  Managed with debridement and chondroplasty to a stable margin.   Gutters Clear without evidence of loose bodies or osteophytes.   Lateral compartment Grade 1 tibial cartilage  Grade 1 femoral cartilage  Lateral meniscus intact   Medial compartment Grade 1-2 tibial cartilage  Grade 3-4 femoral cartilage   Medial meniscus intact  No pathologic plica   Ligaments ACL Intact.   PCL intact  Popliteus intact    Other Mild erythema and synovitis identified within the medial, lateral and patellofemoral compartments adequately debrided to a stable margin with shaver and Coblation.     Within the patellofemoral compartment, a 4.0 mm curved mechanical shaver was used to debride chondral surfaces of the patella and trochlea to stable margin.  Coblation device was utilized to ensure appropriate margin.  This was also repeated in the medial compartment overlying medial femoral condyle as well as the femoral trochlea.  Within the medial, lateral and patellofemoral compartments extensive debridement was performed using 4.0 mm curved mechanical shaver and Coblation.  At this point in time approximately 30 cc of Marcaine with  epinephrine were utilized to provide local anesthesia.     Adequate hemostasis were noted.  Wound closure was performed with buried 3-0 monocryl and overlying Dermabond and steri strips were applied.  4 x 4 and Tegaderm, David style dressing was applied.  The knee was wrapped in a 6-inch Ace wrap.   The final count prior to closure was correct. The patient tolerated the procedure well without complications.     Devin was taken to the recovery room in a stable condition with plan for ambulatory discharge to home. He was provided my postoperative discharge booklet, appropriate home exercises, script for outpatient physical therapy, and a copy of my rehabilitation guidelines.      POST OPERATIVE PLAN:  Activity Precautions: WBAT / ROMAT. To begin physical therapy ASAP.   DVT Prophylaxis: Not indicated as patient is ambulatory.   Follow-up Visit: POD #7-14      Jennifer Sandoval MD  Knee, Shoulder, & Elbow Surgery / Sports Medicine Specialist  Orthopaedic Surgery  [address] [place] [address] [id number] [place] [email or web address]  [email or web address]  t: [phone]  o: [phone]  f: [phone]    This note was dictated using Dragon software.  While it was briefly proofread prior to completion, some grammatical, spelling, and word choice errors due to dictation may still occur.

## 2024-03-12 NOTE — H&P
The below referenced H&P was reviewed by Jennifer Sandoval MD, the patient was examined and no significant changes have occurred in the patient's condition since the H&P was performed.  I discussed with the patient and/or legal representative the potential benefits, risks and side effects of this procedure; the likelihood of the patient achieving goals; and potential problems that might occur during recuperation.  I discussed reasonable alternatives to the procedure, including risks, benefits and side effects related to the alternatives and risks related to not receiving this procedure.  We will proceed with procedure as planned.           Orthopaedic Surgery  [address] [place] [id number]  [phone]     NEW PATIENT VISIT - HISTORY AND PHYSICAL EXAMINATION     Name: Devin Urrutia   MRN: [id number]    CC: Bilateral Knee Pain, left greater than right    REFERRED BY: MD Remigio    HPI:   Devin Urrutia is a very pleasant 58 year old male who presents today for evaluation of bilateral knee pain sustained 2022, due to fall on knees.      To Summarize, Patient was last seen by Remigio Knowles; Patient returns to clinic after last being seen on 12/6 for administration of bilateral Synvisc 1 injections.  He reports no significant relief after the Synvisc 1 injections.  To recap, in the past he has received steroid injections in both knees which is only offered 1 week of relief.  He is attempted physical therapy without any significant relief.  He continues to experience anterior knee pain bilaterally with climbing. More recently however, he accidentally slipped on snow and landed directly onto the left knee.     Today, He is in office today for the possible surgical discussion regarding and knee arthroscopy.  Physical therapy has been ineffective in alleviating his symptomology.  Pain levels today are 2-3/10 with stiffness and difficulty walking.        PMH:   Past Medical History:   Diagnosis Date     Allergic rhinitis     Asthma (HCC)     High cholesterol     Hyperlipidemia     Visual impairment        PAST SURGICAL HX:  Past Surgical History:   Procedure Laterality Date    COLONOSCOPY         FAMILY HX:  Family History   Problem Relation Age of Onset    Heart Disorder Father     Hypertension Mother     Heart Disorder Mother        ALLERGIES:  Patient has no known allergies.    MEDICATIONS:   Current Outpatient Medications   Medication Sig Dispense Refill    Acetaminophen 500 MG Oral Cap Take 2 capsules (1,000 mg total) by mouth every 6 (six) hours for 14 days. 50 capsule 1    traMADol 50 MG Oral Tab Please take 1 tablet every 6 hours as needed for pain. Max of 8 tablets per day. Collaborating - Dr. Jennifer Sandoval. 20 tablet 1    Sennosides-Docusate Sodium 8.6-50 MG Oral Cap Take 1 capsule by mouth daily as needed. 30 capsule 1    ondansetron (ZOFRAN) 4 mg tablet Take 1 tablet by mouth every 8 hours as needed for nausea. 8 tablet 0    Meloxicam 15 MG Oral Tab Take 1 tablet (15 mg total) by mouth daily. 14 tablet 1       ROS: A comprehensive 14 point review of systems was performed and was negative aside from the aforementioned per history of present illness.    SOCIAL HX:  Social History     Tobacco Use    Smoking status: Former    Smokeless tobacco: Former    Tobacco comments:     quit 20 years ago   Substance Use Topics    Alcohol use: Never       PE:   Vitals:    02/20/24 1436 03/12/24 1215   BP:  111/72   BP Location:  Right arm   Pulse:  66   Resp:  16   Temp:  98.3 °F (36.8 °C)   TempSrc:  Temporal   SpO2:  95%   Weight: 181 lb (82.1 kg) 185 lb 3.2 oz (84 kg)   Height: 5' 11\" (1.803 m)      Estimated body mass index is 25.83 kg/m² as calculated from the following:    Height as of this encounter: 5' 11\" (1.803 m).    Weight as of this encounter: 185 lb 3.2 oz (84 kg).    Physical Exam  Constitutional:       Appearance: Normal appearance.   HENT:      Head: Normocephalic and atraumatic.   Eyes:      Extraocular  Movements: Extraocular movements intact.   Neck:      Musculoskeletal: Normal range of motion and neck supple.   Cardiovascular:      Pulses: Normal pulses.   Pulmonary:      Effort: Pulmonary effort is normal. No respiratory distress.   Abdominal:      General: There is no distension.   Skin:     General: Skin is warm.      Capillary Refill: Capillary refill takes less than 2 seconds.      Findings: No bruising.   Neurological:      General: No focal deficit present.      Mental Status: Alert.   Psychiatric:         Mood and Affect: Mood normal.     Examination of the left knee demonstrates:   Skin is intact, warm and dry.   Atrophy: none    Effusion: small    Joint line tenderness:  Primarily patellofemoral  Crepitation: none   Alexandrea: Negative   Patellar mobility: normal without apprehension  J-sign: none    ROM: Extension full  Flexion 120 degrees  ACL:  Negative Lachman, Negative Pivot Shift   PCL:  Negative Posterior Drawer  Collateral Ligaments: Stable to Varus and Valgus stress at 0 and 30 degrees  Strength: normal   Hip joint: normal pain-free ROM   Gait:  normal   Leg length: equal and symmetric  Alignment:  neutral     No obvious peripheral edema noted.   Distal neurovascular exam demonstrates normal perfusion, intact sensation to light touch and full strength.     Examination of the contralateral knee demonstrates:  No significant evidence of functional limitations.  Although also having patellofemoral and medial joint line discomfort.    Radiographic Examination/Diagnostics:  Knee XR personally viewed, independently interpreted and radiology report was reviewed.      PROCEDURE:  MRI KNEE, LEFT (OKF=[id number])     LOCATION:  [facility]       COMPARISON:  None.     INDICATIONS:  G89.29 Chronic pain of left knee M25.562 Chronic pain of left knee S80.02XD Contusion of left knee, subsequent encounter     TECHNIQUE:  Axial, coronal, and sagittal proton density with and without fat saturation images were obtained.      PATIENT STATED HISTORY: (As transcribed by Technologist)  The patient reports a fall five months ago. Now with bilateral medial knee pain.         FINDINGS:    LIGAMENTS:          The ACL, PCL, patellar retinacula, and collateral ligament complexes are intact.  MENISCI:            The medial and lateral menisci are intact without evidence of tear or significant degenerative change.  TENDONS:            The tendinous insertions about the knee are intact without significant tendinosis or tears.  MUSCULATURE:        No evidence of strain, edema, or atrophy.  BONY COMPARTMENTS:  There is no evidence of acute osseous injuries.  There is mild patellofemoral joint osteoarthritis.  There is mild chondromalacia of the femoral tibial compartments.  SYNOVIUM:           There is a mild suprapatellar knee joint effusion.  There is suggestion of 2 ossified intra-articular bodies within the posterior medial joint space, measuring 5 mm and 4 mm.  These are best seen on sagittal fat sat PD images 16 and  17 of series 6.          Impression   CONCLUSION:    1. Mild patellofemoral joint osteoarthritis and mild chondromalacia of the femoral tibial compartments.  Associated mild joint effusion and suggestion of 2 ossified intra-articular bodies within the posterior medial joint space measuring 5 and 4 mm  respectively.  2. No evidence of meniscal injuries.    3. No acute process.          IMPRESSION: Devin Urrutia is a 58 year old male with Chondromalacia, Primary osteoarthritis of both knees, Synovitis, Loose body of left knee sustained 2022, due to a fall on his knees.  He has had extensive conservative treatment including rehabilitative efforts greater than 3 months with physical therapy.  Intra-articular injections with corticosteroid multiple times as well as a series of Synvisc gel injections.  He continues to have pain and mechanical symptoms in the left knee with intra-articular evidence of loose bodies as well as  chondromalacia and knee synovitis.  He is an appropriate candidate for knee arthroscopy to remove the loose bodies and perform a synovectomy to help alleviate functional limitations.  He may additionally benefit from abrasionplasty of articular cartilage wear segments.     PLAN:   We had a detailed discussion outlining the etiology, anatomy, pathophysiology, and natural history of physical finding's. Imaging was reviewed in detail and correlated to a 3-dimensional model of the knee.     I had a lengthy discussion with Devin about the diagnosis and options, both surgical and nonsurgical. I have recommended that we proceed with arthroscopic surgical treatment as we agree that this intervention will likely offer the best opportunity for symptomatic relief and functional recovery. I used the MRI scan and a 3-dimensional knee model to outline his pathology, as well as general surgical principles. We reviewed the risks associated with arthroscopic partial meniscectomy.  In particular I emphasized that the displaced flap component and degenerative portion of the meniscus would be removed as this is dysvascular.  Simultaneously, I will do a diagnostic knee arthroscopy of the whole knee and hope to identify and address any other pathology that may be encountered such as scar tissue, inflammation, cartilage pathology.  In particular we discussed risks that include, a low risk of infection (<1%), potential transient or permanent injury to nerves with superficial numbness, joint stiffness which may require additional physical therapy, persistent pain/lack of symptomatic improvement, need for future operation, wound complications (rare as incisions are very small), deep vein thrombosis (DVT) and pulmonary embolism (PE).   We discussed the proposed rehabilitation timeline as well as expected postoperative restrictions.  In this regard, I emphasized that there will be no weightbearing or range of motion restrictions post  operatively.  Crutches will be provided initially for patient comfort and I will aim to have the patient begin physical therapy on postoperative day 1.  The advantage of this is to begin immediate mobility and range of motion to mitigate pain and encourage reduction of inflammation/swelling.  This will ultimately lead to a quicker postsurgical rehabilitation.  For most patients, this requires a total of 4 to 6 weeks of postsurgical physical therapy to attain full functional status after simple knee arthroscopy.  Devin voiced a good understanding of treatment options, risks and benefits, postoperative instructions, rehabilitation timeline, and restrictions. He was given the opportunity to ask questions, which were all answered to the best of my ability and to his satisfaction. Devin will work with my office to arrange a time for surgery and obtain any medical clearance information necessary. My pre-operative information packet, which details the process and answers many FAQ's will be provided. He was encouraged to call the office with any further questions or concerns.    I spent 45 minutes in preparation to see the patient, counseling/education of relevant pathology, discussing imaging results, ordering therapy  intervention, care coordination, surgical counseling and documentation into the electronic medical record.      FOLLOW-UP:  Post-Operative Visit, POD 8      Jennifer Sandoval MD  Knee, Shoulder, & Elbow Surgery / Sports Medicine Specialist  Orthopaedic Surgery  [address] [place] [address] [id number] [place] [email or web address]  [email or web address]  t: [phone]  o: [phone]  f: [phone]    This note was dictated using Dragon software.  While it was briefly proofread prior to completion, some grammatical, spelling, and word choice errors due to dictation may still occur.

## 2024-03-12 NOTE — ANESTHESIA POSTPROCEDURE EVALUATION
[facility]    Devin Urrutia Patient Status:  Hospital Outpatient Surgery   Age/Gender 58 year old male MRN [id number]   Location [facility] SURGERY Attending Jennifer Sandoval MD   Hosp Day # 0 PCP Braxton Rivas MD       Anesthesia Post-op Note    LEFT KNEE ARTHROSCOPY , CHONDROPLASTY, SYNOVECTOMY    Procedure Summary       Date: 03/12/24 Room / Location:  MAIN OR 14 / [facility] MAIN OR    Anesthesia Start: 1424 Anesthesia Stop:     Procedure: LEFT KNEE ARTHROSCOPY , CHONDROPLASTY, SYNOVECTOMY (Left: Knee) Diagnosis:       Chondromalacia of left knee      (Chondromalacia of left knee [M94.262])    Surgeons: Jennifer Sandoval MD Anesthesiologist: Jaron Wright MD    Anesthesia Type: general ASA Status: 2            Anesthesia Type: general    Vitals Value Taken Time   /84 03/12/24 1529   Temp 97.3 degrees F 03/12/24 1529   Pulse 78 03/12/24 1529   Resp 16 03/12/24 1529   SpO2 98% 03/12/24 1529       Patient Location: PACU    Anesthesia Type: general    Airway Patency: patent and extubated    Postop Pain Control: adequate    Mental Status: mildly sedated but able to meaningfully participate in the post-anesthesia evaluation    Nausea/Vomiting: none    Cardiopulmonary/Hydration status: stable euvolemic    Complications: no apparent anesthesia related complications    Postop vital signs: stable    Dental Exam: Unchanged from Preop    Patient to be discharged from PACU when criteria met.

## 2024-03-13 ENCOUNTER — OFFICE VISIT (OUTPATIENT)
Facility: LOCATION | Age: 59
End: 2024-03-13
Attending: ORTHOPAEDIC SURGERY
Payer: MEDICAID

## 2024-03-13 DIAGNOSIS — M17.0 PRIMARY OSTEOARTHRITIS OF BOTH KNEES: Primary | ICD-10-CM

## 2024-03-13 DIAGNOSIS — M94.262 CHONDROMALACIA OF LEFT KNEE: ICD-10-CM

## 2024-03-13 PROCEDURE — 97110 THERAPEUTIC EXERCISES: CPT

## 2024-03-13 PROCEDURE — 97161 PT EVAL LOW COMPLEX 20 MIN: CPT

## 2024-03-13 NOTE — PROGRESS NOTES
LOWER EXTREMITY EVALUATION:     Diagnosis:    Primary osteoarthritis of both knees (M17.0)  Chondromalacia of left knee (M94.262)      Referring Provider: Jaime  Date of Evaluation:    3/13/2024    Precautions:    WBAT & ROMAT Next MD visit:   3/18/24  Date of Surgery:   3/12/24 - (L) knee arthroscopy, chondroplasty, synovectomy        PATIENT SUMMARY   Devin Urrutia is a 58 year old male who presents to therapy today with complaints of post operative (L) knee pain & mobility restrictions. Had had a fall on his knees back in 2022. Imaging revealed (R) knee PCL & MCL strain as well as loose bodies noted in the (L) knee. Went through extensive conservative treatment including physical therapy as well as intra-articular injections multiple times without significant relief. Underwent procedure (L) knee arthroscopic procedure on 3/12/24. 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 Has steps at home, currently going up and down as instructed (up with good, down with bad). Has been icing and taking prescribed medicine         Pt describes pain level current 3/10, at best 3/10, at worst 3/10.   Current functional limitations include transitions, standing, walking, steps, squatting down, kneeling, participating in workouts at [facility]     Devin describes prior level of function with pain and restriction since fall in 2022. Pt goals include going back to [facility] and workout, daily activities such as walking, steps, being able to kneel or squat down to  things  Past medical history was reviewed with Devin. Significant findings include  has a past medical history of Allergic rhinitis, Asthma (HCC), High cholesterol, Hyperlipidemia, and Visual impairment.      ASSESSMENT  Devin presents to physical therapy evaluation with primary c/o post operative (L) knee pain & mobility restrictions. The results of the objective tests and measures show decreased knee ROM, decreased  flexibility, swelling, decreased LE strength (inhibited/decreased quad contraction), impaired gait, impaired balance, knee mobility restrictions.  Functional deficits include but are not limited to transitions (in/out of car, chair), standing, walking, steps, squatting down to  things from lower surfaces, ADLs, household activities, participating in workouts at the [facility].  Signs and symptoms are consistent with post operative diagnosis. Pt and PT discussed evaluation findings, pathology, POC and HEP.  Pt voiced understanding and performs HEP correctly without reported pain. Skilled Physical Therapy is medically necessary to address the above impairments and reach functional goals.     OBJECTIVE:   Observation: mild swelling (L) knee & LE, arthroscopic site bandages intact, wearing ACE wrap    Gait Summary: pt ambulates on level ground with slight decrease in volitional knee flexion, mild antalgic gait     Patellar Orientation: unremarkable     A/PROM (Degrees) (R) (L)   Knee Flexion 125* 95*   Knee Extension 0 0*     -palpable quad contraction (able to perform SLR without lag)    Flexibility/Special Tests:  Knee/Hip (R) (L)   HS 90/90 Mod Mod   Quad Mod Mod to max   Hip Flexor     Piriformis      ITB      Gastroc-Soleus Mod Mod     Strength/MMT (0-5 Scale):   Knee/Hip (R) (L)   Knee Flexion 4 defer   Knee Extension 5 defer   Hip Flexion 5 5   Hip Extension 4 4   Hip Abduction 4 4   Hip Adduction     Hip ER      Hip IR       Palpation:   Mild diffuse tenderness surrounding (L) knee     Accessory Motion: defer due to post op status    Functional Tests: defer due to post op status (will test in future visits)      Today’s Treatment and Response:   Pt education was provided on exam findings, treatment diagnosis, treatment plan, expectations, and prognosis. Pt was also provided recommendations for possible soreness after evaluation, modalities as needed [ice/heat], detrimental fear avoidance behaviors, and  importance of remaining active. Pt educated on current symptoms, knee pathology, functional anatomy and biomechanics/pathomechanics of the knee complex, following proper rehab protocol and progression, avoidance of aggravating activities, importance of reducing swelling, performance of HEP for optimal outcomes.    Patient was instructed in and issued a HEP for:     Access Code: [email or web address]  URL: [email or web address]  Date: 03/13/2024  Prepared by: Helio Arreola    Exercises  - Supine Quad Set  - 2 x daily - 7 x weekly - 1 sets - 15 reps - 5 second hold  - Active Straight Leg Raise with Quad Set  - 2 x daily - 7 x weekly - 1 sets - 15 reps - 5 second hold hold  - Supine Heel Slide  - 2 x daily - 7 x weekly - 2 sets - 10 reps  - Seated Knee Extension Stretch with Chair  - 2 x daily - 7 x weekly - 1 sets - >2 min hold  - Seated Calf Stretch with Strap  - 2 x daily - 7 x weekly - 3 sets - 30 seconds hold  - Seated Knee Extension Stretch with Chair  - 2 x daily - 7 x weekly - 1 sets - >2 min hold    Charges: PT Eval Low Complexity, TherEx 1 unit      Total Timed Treatment: 10 min     Total Treatment Time: 45 min     Based on clinical rationale and outcome measures, this evaluation involved Low Complexity decision making due to 1-2 personal factors/comorbidities, 3 body structures involved/activity limitations, and evolving symptoms including changing pain levels.  PLAN OF CARE:    Goals: (to be met in 12 visits)  Pt will improve knee extension ROM to 0 deg to allow proper heel strike during gait and terminal knee extension in stance   Pt will demonstrate proper heel to toe gait pattern with good volitional knee flexion and improved terminal knee extension  Pt will improve knee AROM flexion to >120 degrees to improve ability walk, negotiate steps, squat to pick things up   Pt will improve quad strength to 5/5 via MMT & to demonstrate adequate control to ascend/descend 1 flight of stairs reciprocally without UE  assist   Pt will increase hip and knee strength to grossly 4+/5 to be able to get up and down from the floor safely   Pt will perform all ADLs and household activities with minimal to no pain  Pt will be able to walk 3-4 blocks with minimal to no pain  Pt will be independent and compliant with comprehensive HEP to maintain progress achieved in PT      Frequency / Duration: Patient will be seen for 2 x/week or a total of 12 visits over a 90 day period. Treatment will include: Gait training, Manual Therapy, Neuromuscular Re-education, Self-Care Home Management, Therapeutic Activities, Therapeutic Exercise, Home Exercise Program instruction, and Modalities    Education or treatment limitation: None    Rehab Potential:good    LEFS Score  LEFS Score: 31.25 % (3/10/2024  5:17 PM)      Patient/Family/Caregiver was advised of these findings, precautions, and treatment options and has agreed to actively participate in planning and for this course of care.    Thank you for your referral. Please co-sign or sign and return this letter via fax as soon as possible to [phone]. If you have any questions, please contact me at Dept: [phone]    Sincerely,  Electronically signed by therapist: Helio Arreola, PT  Physician's certification required: Yes  I certify the need for these services furnished under this plan of treatment and while under my care.    X___________________________________________________ Date____________________    Certification From: 3/13/2024  To:6/11/2024

## 2024-03-18 ENCOUNTER — OFFICE VISIT (OUTPATIENT)
Dept: ORTHOPEDICS CLINIC | Facility: CLINIC | Age: 59
End: 2024-03-18
Payer: MEDICAID

## 2024-03-18 DIAGNOSIS — Z98.890 S/P ARTHROSCOPY OF KNEE: Primary | ICD-10-CM

## 2024-03-18 PROCEDURE — 99024 POSTOP FOLLOW-UP VISIT: CPT | Performed by: PHYSICIAN ASSISTANT

## 2024-03-18 NOTE — PROGRESS NOTES
[facility]  [address] [place] [id number]  [phone]       Name: Devin Urrutia   MRN: [id number]  Date: 3/18/2024     REASON FOR VISIT: First Post-Surgical Visit   Surgery: Left knee scope, chondroplasty of patella, trochlea, and medial femoral condyle - extensive debridement on 03/12/2024.     INTERVAL HISTORY:  Devin Urrutia is a 58 year old male who returns after the aforementioned procedure.  The post-operative course has been unremarkable with pain well controlled and overall progress noted.     Physical therapy was started and is progressing well.  The patient denies any calf pain or tenderness, fevers, chills, sweats or signs of active infection. The patient has been compliant with the postoperative protocol, and admits to normal bowel and bladder function. No acute issues.     ROS: ROS    PE:   There were no vitals filed for this visit.  Estimated body mass index is 25.83 kg/m² as calculated from the following:    Height as of 2/20/24: 5' 11\" (1.803 m).    Weight as of 3/12/24: 185 lb 3.2 oz (84 kg).    Physical Exam  Constitutional:       Appearance: Normal appearance.   HENT:      Head: Normocephalic and atraumatic.   Eyes:      Extraocular Movements: Extraocular movements intact.   Neck:      Musculoskeletal: Normal range of motion and neck supple.   Cardiovascular:      Pulses: Normal pulses.   Pulmonary:      Effort: Pulmonary effort is normal. No respiratory distress.   Abdominal:      General: There is no distension.   Skin:     General: Skin is warm.      Capillary Refill: Capillary refill takes less than 2 seconds.      Findings: No bruising.   Neurological:      General: No focal deficit present.      Mental Status: She is alert.   Psychiatric:         Mood and Affect: Mood normal.     Examination of the left knee demonstrates:     Physical examination the patient is alert and oriented x3, well-developed, well-nourished, no acute distress.      Tegaderm dressings were removed, and Steri-Strips were maintained and kept in place.    Incisional sites are clean dry intact without signs of active pathology.  Calf is soft and nontender to palpation.    The contralateral knee is without limitation in range of motion or strength, no positive provocative maneuvers.         IMPRESSION: Devin Urrutia is a 58 year old male who presents 6 days s/p Left knee scope, chondroplasty of patella, trochlea, and medial femoral condyle - extensive debridement on 03/12/2024.     PLAN:   We had a lengthy discussion with the patient regarding the patient's findings consistent with the expected postoperative course. We recommend continuation of physical therapy with rehabilitation efforts focused on strengthening, range of motion, functional ability, and return to baseline activity. The patient can continue to progress per protocol.    All questions were answered appropriately and the patient was in agreement with the treatment plan.       FOLLOW-UP:  Return to clinic in four weeks. No imaging required at next visit.             [facility], PA-C Orthopedic Surgery / Sports Medicine Specialist  [facility]  [address] [place] [address] [id number] [place] [address].org  [email or web address]  t: [phone]  o: [phone]  f: [phone]    This note was dictated using Dragon software.  While it was briefly proofread prior to completion, some grammatical, spelling, and word choice errors due to dictation may still occur.

## 2024-03-19 ENCOUNTER — OFFICE VISIT (OUTPATIENT)
Facility: LOCATION | Age: 59
End: 2024-03-19
Attending: ORTHOPAEDIC SURGERY
Payer: MEDICAID

## 2024-03-19 PROCEDURE — 97140 MANUAL THERAPY 1/> REGIONS: CPT

## 2024-03-19 PROCEDURE — 97110 THERAPEUTIC EXERCISES: CPT

## 2024-03-19 NOTE — PROGRESS NOTES
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)        Referring Provider: Jaime  Date of Evaluation:    3/13/24    Precautions:  None Next MD visit:     Date of Surgery:   3/12/24 - (L) knee arthroscopy, chondroplasty, synovectomy    Insurance Primary/Secondary: BLUE CROSS MEDICAID / N/A     # Auth Visits: 12            Subjective: had follow up with surgeon; bandages taken off; having difficulty bending knee     Pain: 4/10      Objective:   AROM (L) knee flexion 115 degrees (post treatment) - 105 degrees prior to      Assessment:   Pt demonstrates enough quad contraction to perform SLR without lag. Able to obtain 115 degrees of (L) knee flexion albeit with some pain  Swelling at the arthroscopic sites. Encouraged patient to ice frequently as he admits to not icing very much. Tolerated exercises today and will progress depending on response      Goals:   Pt will improve knee extension ROM to 0 deg to allow proper heel strike during gait and terminal knee extension in stance   Pt will demonstrate proper heel to toe gait pattern with good volitional knee flexion and improved terminal knee extension  Pt will improve knee AROM flexion to >120 degrees to improve ability walk, negotiate steps, squat to pick things up   Pt will improve quad strength to 5/5 via MMT & to demonstrate adequate control to ascend/descend 1 flight of stairs reciprocally without UE assist   Pt will increase hip and knee strength to grossly 4+/5 to be able to get up and down from the floor safely   Pt will perform all ADLs and household activities with minimal to no pain  Pt will be able to walk 3-4 blocks with minimal to no pain  Pt will be independent and compliant with comprehensive HEP to maintain progress achieved in PT      Plan: improve knee ROM via soft tissue treatment, gentle stretching; strength quad and hip musculature; balance  Date: 3/19/2024  TX#: 2/12 Date:                 TX#: 3/  Date:                 TX#: 4/ Date:                 TX#: 5/ Date:   Tx#: 6/   Manual Therapy (15 min)       STM to (L) RF, lateral quad  Patellar inferior glides, grade 3       TherEx (30 min)       NuStep x 8 min       Heel slide x 20       Supine quad set x 20 (5 second hold)        SLR w/quad set x 20       SAQ x 15       SL clam x 30       Seated LAQ x 20       Seated HS stretch 30 sec x 3       Standing calf stretch on SB 30 sec x 3       HEP:   Access Code: [email or web address]  URL: [email or web address]  Date: 03/13/2024  Prepared by: Helio Arreola    Exercises  - Supine Quad Set  - 2 x daily - 7 x weekly - 1 sets - 15 reps - 5 second hold  - Active Straight Leg Raise with Quad Set  - 2 x daily - 7 x weekly - 1 sets - 15 reps - 5 second hold hold  - Supine Heel Slide  - 2 x daily - 7 x weekly - 2 sets - 10 reps  - Seated Knee Extension Stretch with Chair  - 2 x daily - 7 x weekly - 1 sets - >2 min hold  - Seated Calf Stretch with Strap  - 2 x daily - 7 x weekly - 3 sets - 30 seconds hold  - Seated Knee Extension Stretch with Chair  - 2 x daily - 7 x weekly - 1 sets - >2 min hold      Charges: Manual 1; TherEx 2       Total Timed Treatment: 45 min  Total Treatment Time: 45 min

## 2024-03-21 ENCOUNTER — OFFICE VISIT (OUTPATIENT)
Facility: LOCATION | Age: 59
End: 2024-03-21
Attending: ORTHOPAEDIC SURGERY
Payer: MEDICAID

## 2024-03-21 PROCEDURE — 97140 MANUAL THERAPY 1/> REGIONS: CPT

## 2024-03-21 PROCEDURE — 97110 THERAPEUTIC EXERCISES: CPT

## 2024-03-21 NOTE — PROGRESS NOTES
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)        Referring Provider: Jaime  Date of Evaluation:    3/13/24    Precautions:  None Next MD visit:     Date of Surgery:   3/12/24 - (L) knee arthroscopy, chondroplasty, synovectomy    Insurance Primary/Secondary: BLUE CROSS MEDICAID / N/A     # Auth Visits: 12            Subjective: knee a bit better than last session; has been icing more     Pain: 2/10      Objective:   AROM (L) knee flexion 125 degrees (post treatment)      Assessment:   Swelling at arthroscopic sites - encouraged continued icing throughout the day  Improved knee flexion, good knee extension         Goals:   Pt will improve knee extension ROM to 0 deg to allow proper heel strike during gait and terminal knee extension in stance   Pt will demonstrate proper heel to toe gait pattern with good volitional knee flexion and improved terminal knee extension  Pt will improve knee AROM flexion to >120 degrees to improve ability walk, negotiate steps, squat to pick things up   Pt will improve quad strength to 5/5 via MMT & to demonstrate adequate control to ascend/descend 1 flight of stairs reciprocally without UE assist   Pt will increase hip and knee strength to grossly 4+/5 to be able to get up and down from the floor safely   Pt will perform all ADLs and household activities with minimal to no pain  Pt will be able to walk 3-4 blocks with minimal to no pain  Pt will be independent and compliant with comprehensive HEP to maintain progress achieved in PT      Plan: improve knee ROM via soft tissue treatment, gentle stretching; strength quad and hip musculature; balance  Date: 3/19/2024  TX#: 2/12 Date:  3/21/24             TX#: 3/12 Date:                 TX#: 4/ Date:                 TX#: 5/ Date:   Tx#: 6/   Manual Therapy (15 min) Manual Therapy (15 min)      STM to (L) RF, lateral quad  Patellar inferior glides, grade 3 STM/IASTM to (L) RF, lateral  quad  Patellar inferior glides, grade 3      TherEx (30 min) TherEx (25 min)      NuStep x 8 min NuStep x 8 min      Heel slide x 20 Supine quad set x 20 (5 second hold)       Supine quad set x 20 (5 second hold)  SLR w/quad set x 20      SLR w/quad set x 20 LAQ x 15      SAQ x 15 SL clam x 30      SL clam x 30       Seated LAQ x 20 Standing HS stretch 30 sec x 2      Seated HS stretch 30 sec x 3 SB calf stretch 30 sec x 3      Standing calf stretch on SB 30 sec x 3 Shuttle (DL) 100# 2 x 15  Shuttle (SL) 62# x 20      HEP:   Access Code: [email or web address]  URL: [email or web address]  Date: 03/13/2024  Prepared by: Helio Arreola    Exercises  - Supine Quad Set  - 2 x daily - 7 x weekly - 1 sets - 15 reps - 5 second hold  - Active Straight Leg Raise with Quad Set  - 2 x daily - 7 x weekly - 1 sets - 15 reps - 5 second hold hold  - Supine Heel Slide  - 2 x daily - 7 x weekly - 2 sets - 10 reps  - Seated Knee Extension Stretch with Chair  - 2 x daily - 7 x weekly - 1 sets - >2 min hold  - Seated Calf Stretch with Strap  - 2 x daily - 7 x weekly - 3 sets - 30 seconds hold  - Seated Knee Extension Stretch with Chair  - 2 x daily - 7 x weekly - 1 sets - >2 min hold      Charges: Manual 1; TherEx 2       Total Timed Treatment: 40 min  Total Treatment Time: 40 min

## 2024-03-26 ENCOUNTER — OFFICE VISIT (OUTPATIENT)
Facility: LOCATION | Age: 59
End: 2024-03-26
Attending: ORTHOPAEDIC SURGERY
Payer: MEDICAID

## 2024-03-26 PROCEDURE — 97110 THERAPEUTIC EXERCISES: CPT

## 2024-03-26 NOTE — PROGRESS NOTES
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)        Referring Provider: Jaime  Date of Evaluation:    3/13/24    Precautions:  None Next MD visit:     Date of Surgery:   3/12/24 - (L) knee arthroscopy, chondroplasty, synovectomy    Insurance Primary/Secondary: BLUE CROSS MEDICAID / N/A     # Auth Visits: 12            Subjective: knee feeling good today; feels much better with ice. Walking is easier at times and then other times it is painful    Pain: 3/10      Objective:   See flowsheet  AROM (L) knee 0-130 degrees       Assessment:   Tolerated treatment session well without increased pain   Good knee ROM overall, solid quad contraction, however demonstrates quad weakness with functional strength such as step up/down   Some swelling at arthroscopic sites         Goals:   Pt will improve knee extension ROM to 0 deg to allow proper heel strike during gait and terminal knee extension in stance   Pt will demonstrate proper heel to toe gait pattern with good volitional knee flexion and improved terminal knee extension  Pt will improve knee AROM flexion to >120 degrees to improve ability walk, negotiate steps, squat to pick things up   Pt will improve quad strength to 5/5 via MMT & to demonstrate adequate control to ascend/descend 1 flight of stairs reciprocally without UE assist   Pt will increase hip and knee strength to grossly 4+/5 to be able to get up and down from the floor safely   Pt will perform all ADLs and household activities with minimal to no pain  Pt will be able to walk 3-4 blocks with minimal to no pain  Pt will be independent and compliant with comprehensive HEP to maintain progress achieved in PT      Plan: improve knee ROM via soft tissue treatment, gentle stretching; strength quad and hip musculature; balance  Date: 3/19/2024  TX#: 2/12 Date:  3/21/24             TX#: 3/12 Date: 3/26/24                TX#: 4/12 Date:                 TX#: 5/ Date:    Tx#: 6/   Manual Therapy (15 min) Manual Therapy (15 min) Manual Therapy (NT)     STM to (L) RF, lateral quad  Patellar inferior glides, grade 3 STM/IASTM to (L) RF, lateral quad  Patellar inferior glides, grade 3      TherEx (30 min) TherEx (25 min) TherEx (40 min)     NuStep x 8 min NuStep x 8 min Upright bike x 8 min     Heel slide x 20 Supine quad set x 20 (5 second hold)  SLR w/quad set x 20     Supine quad set x 20 (5 second hold)  SLR w/quad set x 20 Bridge x 20     SLR w/quad set x 20 LAQ x 15 SL clam GTB x 20     SAQ x 15 SL clam x 30 HS stretch on step 30 sec x 3     SL clam x 30  Prostretch (gastroc) 30 sec x 3     Seated LAQ x 20 Standing HS stretch 30 sec x 2 Shuttle (DL) 112 # 2 x 15       Seated HS stretch 30 sec x 3 SB calf stretch 30 sec x 3 Rocker board AP balance x 1 min     Standing calf stretch on SB 30 sec x 3 Shuttle (DL) 100# 2 x 15  Shuttle (SL) 62# x 20 (B) heel raise x 20       6 inch FSU x 15      HEP:   Access Code: [email or web address]  URL: [email or web address]  Date: 03/13/2024  Prepared by: Helio Arreola    Exercises  - Supine Quad Set  - 2 x daily - 7 x weekly - 1 sets - 15 reps - 5 second hold  - Active Straight Leg Raise with Quad Set  - 2 x daily - 7 x weekly - 1 sets - 15 reps - 5 second hold hold  - Supine Heel Slide  - 2 x daily - 7 x weekly - 2 sets - 10 reps  - Seated Knee Extension Stretch with Chair  - 2 x daily - 7 x weekly - 1 sets - >2 min hold  - Seated Calf Stretch with Strap  - 2 x daily - 7 x weekly - 3 sets - 30 seconds hold  - Seated Knee Extension Stretch with Chair  - 2 x daily - 7 x weekly - 1 sets - >2 min hold      Charges: TherEx 3       Total Timed Treatment: 40 min  Total Treatment Time: 40 min

## 2024-03-27 ENCOUNTER — TELEMEDICINE (OUTPATIENT)
Dept: FAMILY MEDICINE CLINIC | Facility: CLINIC | Age: 59
End: 2024-03-27
Payer: MEDICAID

## 2024-03-27 DIAGNOSIS — J32.9 SINUSITIS, UNSPECIFIED CHRONICITY, UNSPECIFIED LOCATION: Primary | ICD-10-CM

## 2024-03-27 PROCEDURE — 99213 OFFICE O/P EST LOW 20 MIN: CPT | Performed by: FAMILY MEDICINE

## 2024-03-27 RX ORDER — ALBUTEROL SULFATE 2.5 MG/3ML
2.5 SOLUTION RESPIRATORY (INHALATION) EVERY 6 HOURS PRN
Qty: 100 EACH | Refills: 3 | Status: SHIPPED | OUTPATIENT
Start: 2024-03-27

## 2024-03-27 RX ORDER — AMOXICILLIN AND CLAVULANATE POTASSIUM 875; 125 MG/1; MG/1
1 TABLET, FILM COATED ORAL 2 TIMES DAILY
Qty: 20 TABLET | Refills: 0 | Status: SHIPPED | OUTPATIENT
Start: 2024-03-27 | End: 2024-04-06

## 2024-03-27 NOTE — PROGRESS NOTES
Subjective:   Patient ID: Devin Urrutia is a 58 year old male.    HPI  Mr. Urrutia is a pleasant 58-year-old gentleman with history of asthma, allergic rhinitis, hypothyroidism, hyperlipidemia arthritis presenting for video visit today for sinus congestion and pain which started yesterday associated with nasal congestion and cough.  No fever no bodyaches no shortness of breath no chest pain no nausea no vomiting no abdominal pain.  He does have history of recurrent sinusitis and will need an antibiotic that would treat this. I had reviewed past medical and family histories together with allergy and medication lists documented.    History/Other:   Review of Systems   Constitutional:  Negative for fever.   Respiratory:  Negative for shortness of breath.    Cardiovascular:  Negative for chest pain.   Gastrointestinal:  Negative for abdominal pain, diarrhea, nausea and vomiting.     Current Outpatient Medications   Medication Sig Dispense Refill    albuterol (2.5 MG/3ML) 0.083% Inhalation Nebu Soln Take 3 mL (2.5 mg total) by nebulization every 6 (six) hours as needed for Wheezing or Shortness of Breath. 100 each 3    amoxicillin clavulanate 875-125 MG Oral Tab Take 1 tablet by mouth 2 (two) times daily for 10 days. 20 tablet 0    cetirizine 5 MG Oral Tab Take 1 tablet (5 mg total) by mouth daily.      traMADol 50 MG Oral Tab Please take 1 tablet every 6 hours as needed for pain. Max of 8 tablets per day. Collaborating - Dr. Jennifer Sandoval. 20 tablet 1    Sennosides-Docusate Sodium 8.6-50 MG Oral Cap Take 1 capsule by mouth daily as needed. 30 capsule 1    ondansetron (ZOFRAN) 4 mg tablet Take 1 tablet by mouth every 8 hours as needed for nausea. 8 tablet 0    Meloxicam 15 MG Oral Tab Take 1 tablet (15 mg total) by mouth daily. 14 tablet 1    silodosin 4 MG Oral Cap Take 1 capsule (4 mg total) by mouth daily. 90 capsule 3    atorvastatin 10 MG Oral Tab Take 1 tablet (10 mg total) by mouth nightly. 90 tablet 1    Fluocinolone  Acetonide 0.01 % Otic Oil 2 drops each ear twice daily for 30 days, repeat as necessary 1 each 5    FLOVENT  MCG/ACT Inhalation Aerosol Inhale 2 puffs into the lungs 2 (two) times daily. 1 each 3    montelukast 10 MG Oral Tab Take 1 tablet (10 mg total) by mouth nightly. 90 tablet 1    Respiratory Therapy Supplies (NEBULIZER) Does not apply Device Use every 4-6 hours as needed 1 each 0    albuterol 108 (90 Base) MCG/ACT Inhalation Aero Soln Inhale 2 puffs into the lungs every 6 (six) hours as needed. 1 each 3    Olopatadine HCl 0.2 % Ophthalmic Solution Apply 1 drop to eye 3 (three) times daily as needed. 1 each 3    Respiratory Therapy Supplies (NEBULIZER) Does not apply Device Please use every 6 hour as needed for Shortness of breath, wheezing 1 Device 0     Allergies:No Known Allergies    Objective:   Physical Exam  Constitutional:       General: He is not in acute distress.  Neurological:      Mental Status: He is alert.         Assessment & Plan:   1. Sinusitis, unspecified chronicity, unspecified location    -Continue with current medication regimen for allergies  - We will treat with Augmentin which was sent to his pharmacy  - Keep hydrated  - May take Tylenol or ibuprofen as needed for fever or pain  - Call or come in sooner symptoms worsen or persist    This note was prepared using Dragon Medical voice recognition dictation software. As a result errors may occur. When identified these errors have been corrected. While every attempt is made to correct errors during dictation discrepancies may still exist            No orders of the defined types were placed in this encounter.      Meds This Visit:  Requested Prescriptions     Signed Prescriptions Disp Refills    albuterol (2.5 MG/3ML) 0.083% Inhalation Nebu Soln 100 each 3     Sig: Take 3 mL (2.5 mg total) by nebulization every 6 (six) hours as needed for Wheezing or Shortness of Breath.    amoxicillin clavulanate 875-125 MG Oral Tab 20 tablet 0      Sig: Take 1 tablet by mouth 2 (two) times daily for 10 days.       Imaging & Referrals:  None

## 2024-03-28 ENCOUNTER — OFFICE VISIT (OUTPATIENT)
Dept: SURGERY | Facility: CLINIC | Age: 59
End: 2024-03-28

## 2024-03-28 DIAGNOSIS — N40.1 BPH WITH OBSTRUCTION/LOWER URINARY TRACT SYMPTOMS: ICD-10-CM

## 2024-03-28 DIAGNOSIS — N13.8 BPH WITH OBSTRUCTION/LOWER URINARY TRACT SYMPTOMS: ICD-10-CM

## 2024-03-28 DIAGNOSIS — R35.1 NOCTURIA: ICD-10-CM

## 2024-03-28 DIAGNOSIS — R82.90 URINE FINDING: Primary | ICD-10-CM

## 2024-03-28 LAB
APPEARANCE: CLEAR
BILIRUBIN: NEGATIVE
GLUCOSE (URINE DIPSTICK): NEGATIVE MG/DL
KETONES (URINE DIPSTICK): NEGATIVE MG/DL
LEUKOCYTES: NEGATIVE
MULTISTIX LOT#: NORMAL NUMERIC
NITRITE, URINE: NEGATIVE
OCCULT BLOOD: NEGATIVE
PH, URINE: 5.5 (ref 4.5–8)
PROTEIN (URINE DIPSTICK): NEGATIVE MG/DL
SPECIFIC GRAVITY: 1 (ref 1–1.03)
URINE-COLOR: YELLOW
UROBILINOGEN,SEMI-QN: 0.2 MG/DL (ref 0–1.9)

## 2024-03-28 PROCEDURE — 51798 US URINE CAPACITY MEASURE: CPT | Performed by: SURGERY

## 2024-03-28 PROCEDURE — 99214 OFFICE O/P EST MOD 30 MIN: CPT | Performed by: SURGERY

## 2024-03-28 PROCEDURE — 81003 URINALYSIS AUTO W/O SCOPE: CPT | Performed by: SURGERY

## 2024-03-28 RX ORDER — ALFUZOSIN HYDROCHLORIDE 10 MG/1
10 TABLET, EXTENDED RELEASE ORAL DAILY
Qty: 90 TABLET | Refills: 5 | Status: SHIPPED | OUTPATIENT
Start: 2024-03-28

## 2024-03-28 NOTE — PROGRESS NOTES
Urology Clinic Note    Primary Care Provider:  Braxton Rivas MD     Chief Complaint:   BPH/LUTS     HPI:   Devin Urrutia is a 58 year old male with history of asthma, hyperlipidemia referred for lower urinary tract symptoms.    I saw him for initial visit on 12/7/2023.  At that time he endorsed nocturia hourly, and he endorsed drinking water when waking up at night due to dry mouth.  He also endorsed slow urinary stream and urinary hesitancy.  His symptoms are more bothersome at night.  2 cups of tea in the morning only.  NICOLE with 45 g benign prostate.  I started him on tamsulosin 0.4 mg nightly.  He experienced dizziness and fatigue so we will switch to silodosin.    He was unable to get silodosin due to insurance.  He continues to endorse bothersome nocturia every hour.  He does wake up with dry mouth so drinks water throughout the night.  He denies urinary urgency or frequency during the daytime.  He occasionally has weak urinary stream but this is not his main problem.  He is unsure if he snores, and given significant nocturia with no daytime symptoms as well as dry mouth I recommend sleep study.  I will also trial alfuzosin to see if insurance will cover this and if this would not cause dizziness and other side effects.     His most recent PSA on 2/7/2023 was 2.46.    AUA symptom score is 29/4 with LUTS.    Post-void residual bladder scan: 60 mL    Urinalysis: Negative    PSA:  Lab Results   Component Value Date    PSAS 2.46 02/07/2023    PSAS 1.62 09/24/2019        History:     Past Medical History:   Diagnosis Date    Allergic rhinitis     Asthma (HCC)     High cholesterol     Hyperlipidemia     Visual impairment        Past Surgical History:   Procedure Laterality Date    COLONOSCOPY         Family History   Problem Relation Age of Onset    Heart Disorder Father     Hypertension Mother     Heart Disorder Mother        Social History     Socioeconomic History    Marital status:    Tobacco Use     Smoking status: Former    Smokeless tobacco: Former    Tobacco comments:     quit 20 years ago   Vaping Use    Vaping Use: Never used   Substance and Sexual Activity    Alcohol use: Never    Drug use: Never   Other Topics Concern    Caffeine Concern Yes     Comment: occassionally    Exercise Yes     Comment: every other day       Medications (Active prior to today's visit):  Current Outpatient Medications   Medication Sig Dispense Refill    albuterol (2.5 MG/3ML) 0.083% Inhalation Nebu Soln Take 3 mL (2.5 mg total) by nebulization every 6 (six) hours as needed for Wheezing or Shortness of Breath. 100 each 3    cetirizine 5 MG Oral Tab Take 1 tablet (5 mg total) by mouth daily.      atorvastatin 10 MG Oral Tab Take 1 tablet (10 mg total) by mouth nightly. 90 tablet 1    Fluocinolone Acetonide 0.01 % Otic Oil 2 drops each ear twice daily for 30 days, repeat as necessary 1 each 5    FLOVENT  MCG/ACT Inhalation Aerosol Inhale 2 puffs into the lungs 2 (two) times daily. 1 each 3    montelukast 10 MG Oral Tab Take 1 tablet (10 mg total) by mouth nightly. 90 tablet 1    Respiratory Therapy Supplies (NEBULIZER) Does not apply Device Use every 4-6 hours as needed 1 each 0    albuterol 108 (90 Base) MCG/ACT Inhalation Aero Soln Inhale 2 puffs into the lungs every 6 (six) hours as needed. 1 each 3    Olopatadine HCl 0.2 % Ophthalmic Solution Apply 1 drop to eye 3 (three) times daily as needed. 1 each 3    Respiratory Therapy Supplies (NEBULIZER) Does not apply Device Please use every 6 hour as needed for Shortness of breath, wheezing 1 Device 0    amoxicillin clavulanate 875-125 MG Oral Tab Take 1 tablet by mouth 2 (two) times daily for 10 days. (Patient not taking: Reported on 3/28/2024) 20 tablet 0    traMADol 50 MG Oral Tab Please take 1 tablet every 6 hours as needed for pain. Max of 8 tablets per day. Collaborating - Dr. Jennifer Sandoval. (Patient not taking: Reported on 3/28/2024) 20 tablet 1    Sennosides-Docusate  Sodium 8.6-50 MG Oral Cap Take 1 capsule by mouth daily as needed. (Patient not taking: Reported on 3/28/2024) 30 capsule 1    ondansetron (ZOFRAN) 4 mg tablet Take 1 tablet by mouth every 8 hours as needed for nausea. (Patient not taking: Reported on 3/28/2024) 8 tablet 0    Meloxicam 15 MG Oral Tab Take 1 tablet (15 mg total) by mouth daily. (Patient not taking: Reported on 3/28/2024) 14 tablet 1    silodosin 4 MG Oral Cap Take 1 capsule (4 mg total) by mouth daily. (Patient not taking: Reported on 3/28/2024) 90 capsule 3       Allergies:  No Known Allergies    Review of Systems:   A comprehensive 10-point review of systems was completed.  Pertinent positives and negatives are noted in the the HPI.    Physical Exam:   CONSTITUTIONAL: Well developed, well nourished, in no acute distress  NEUROLOGIC: Alert and oriented  HEAD: Normocephalic, atraumatic  EYES: Sclera non-icteric  ENT: Hearing intact, moist mucous membranes  NECK: No obvious goiter or masses  RESPIRATORY: Normal respiratory effort  SKIN: No evident rashes  ABDOMEN: Soft, non-tender, non-distended  DIGITAL RECTAL EXAM: ~45 gram prostate, no nodules or tenderness    Assessment & Plan:   Devin Urrutia is a 58 year old male with history of asthma, hyperlipidemia referred for lower urinary tract symptoms.    I saw him for initial visit on 12/7/2023.  At that time he endorsed nocturia hourly, and he endorsed drinking water when waking up at night due to dry mouth.  He also endorsed slow urinary stream and urinary hesitancy.  His symptoms are more bothersome at night.  2 cups of tea in the morning only.  NICOLE with 45 g benign prostate.  I started him on tamsulosin 0.4 mg nightly.  He experienced dizziness and fatigue so we will switch to silodosin.    He was unable to get silodosin due to insurance.  He continues to endorse bothersome nocturia every hour.  He does wake up with dry mouth so drinks water throughout the night.  He denies urinary urgency or  frequency during the daytime.  He occasionally has weak urinary stream but this is not his main problem.  He is unsure if he snores, and given significant nocturia with no daytime symptoms as well as dry mouth I recommend sleep study.  I will also trial alfuzosin to see if insurance will cover this and if this would not cause dizziness and other side effects.     His most recent PSA on 2/7/2023 was 2.46.    -Sleep study  -Try to limit fluids before bedtime and during the night  -Start alfuzosin 10 mg daily  -Return to clinic in 3 months    In total, 30 minutes were spent on this patient encounter (including chart review, patient history, physical, and counseling, documentation, and communication).    Julio Birmingham MD  Staff Urologist  [facility]  Office: [phone]

## 2024-03-29 ENCOUNTER — APPOINTMENT (OUTPATIENT)
Facility: LOCATION | Age: 59
End: 2024-03-29
Attending: ORTHOPAEDIC SURGERY
Payer: MEDICAID

## 2024-03-29 ENCOUNTER — TELEPHONE (OUTPATIENT)
Dept: PHYSICAL THERAPY | Facility: HOSPITAL | Age: 59
End: 2024-03-29

## 2024-03-29 NOTE — PROGRESS NOTES
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)        Referring Provider: Jaime  Date of Evaluation:    3/13/24    Precautions:  None Next MD visit:     Date of Surgery:   3/12/24 - (L) knee arthroscopy, chondroplasty, synovectomy    Insurance Primary/Secondary: [employer] MEDICAID / N/A     # Auth Visits: 12            Subjective: ***    Pain: ***/10      Objective:   See flowsheet  AROM (L) knee 0-130 degrees       Assessment:   ***        Goals:   Pt will improve knee extension ROM to 0 deg to allow proper heel strike during gait and terminal knee extension in stance   Pt will demonstrate proper heel to toe gait pattern with good volitional knee flexion and improved terminal knee extension  Pt will improve knee AROM flexion to >120 degrees to improve ability walk, negotiate steps, squat to pick things up   Pt will improve quad strength to 5/5 via MMT & to demonstrate adequate control to ascend/descend 1 flight of stairs reciprocally without UE assist   Pt will increase hip and knee strength to grossly 4+/5 to be able to get up and down from the floor safely   Pt will perform all ADLs and household activities with minimal to no pain  Pt will be able to walk 3-4 blocks with minimal to no pain  Pt will be independent and compliant with comprehensive HEP to maintain progress achieved in PT      Plan: improve knee ROM via soft tissue treatment, gentle stretching; strength quad and hip musculature; balance  Date: 3/19/2024  TX#: 2/12 Date:  3/21/24             TX#: 3/12 Date: 3/26/24                TX#: 4/12 Date:  3/29/24               TX#: 5/12 Date:   Tx#: 6/   Manual Therapy (15 min) Manual Therapy (15 min) Manual Therapy (NT)     STM to (L) RF, lateral quad  Patellar inferior glides, grade 3 STM/IASTM to (L) RF, lateral quad  Patellar inferior glides, grade 3      TherEx (30 min) TherEx (25 min) TherEx (40 min) TherEx (*** min)    NuStep x 8 min NuStep x 8 min  Upright bike x 8 min Upright bike x 8 min    Heel slide x 20 Supine quad set x 20 (5 second hold)  SLR w/quad set x 20 SLR w/quad set x 20    Supine quad set x 20 (5 second hold)  SLR w/quad set x 20 Bridge x 20 Bridge x 20    SLR w/quad set x 20 LAQ x 15 SL clam GTB x 20 SL clam GTB x 20    SAQ x 15 SL clam x 30 HS stretch on step 30 sec x 3 HS stretch on step 30 sec x 3    SL clam x 30  Prostretch (gastroc) 30 sec x 3 Prostretch (gastroc) 30 sec x 3    Seated LAQ x 20 Standing HS stretch 30 sec x 2 Shuttle (DL) 112 # 2 x 15   Shuttle (DL) 112 # 2 x 15    Seated HS stretch 30 sec x 3 SB calf stretch 30 sec x 3 Rocker board AP balance x 1 min Rocker board AP balance x 1 min    Standing calf stretch on SB 30 sec x 3 Shuttle (DL) 100# 2 x 15  Shuttle (SL) 62# x 20 (B) heel raise x 20 (B) heel raise x 20      6 inch FSU x 15  6 inch FSU x 15     HEP:   Access Code: [email or web address]  URL: [email or web address]  Date: 03/13/2024  Prepared by: Helio Arreola    Exercises  - Supine Quad Set  - 2 x daily - 7 x weekly - 1 sets - 15 reps - 5 second hold  - Active Straight Leg Raise with Quad Set  - 2 x daily - 7 x weekly - 1 sets - 15 reps - 5 second hold hold  - Supine Heel Slide  - 2 x daily - 7 x weekly - 2 sets - 10 reps  - Seated Knee Extension Stretch with Chair  - 2 x daily - 7 x weekly - 1 sets - >2 min hold  - Seated Calf Stretch with Strap  - 2 x daily - 7 x weekly - 3 sets - 30 seconds hold  - Seated Knee Extension Stretch with Chair  - 2 x daily - 7 x weekly - 1 sets - >2 min hold      Charges: TherEx ***       Total Timed Treatment: *** min  Total Treatment Time: *** min

## 2024-04-01 ENCOUNTER — TELEPHONE (OUTPATIENT)
Dept: PHYSICAL THERAPY | Facility: HOSPITAL | Age: 59
End: 2024-04-01

## 2024-04-02 ENCOUNTER — APPOINTMENT (OUTPATIENT)
Facility: LOCATION | Age: 59
End: 2024-04-02
Attending: ORTHOPAEDIC SURGERY
Payer: COMMERCIAL

## 2024-04-03 ENCOUNTER — TELEPHONE (OUTPATIENT)
Dept: PHYSICAL THERAPY | Facility: HOSPITAL | Age: 59
End: 2024-04-03

## 2024-04-04 ENCOUNTER — APPOINTMENT (OUTPATIENT)
Facility: LOCATION | Age: 59
End: 2024-04-04
Attending: ORTHOPAEDIC SURGERY
Payer: COMMERCIAL

## 2024-04-08 ENCOUNTER — TELEPHONE (OUTPATIENT)
Dept: PHYSICAL THERAPY | Facility: HOSPITAL | Age: 59
End: 2024-04-08

## 2024-04-09 ENCOUNTER — APPOINTMENT (OUTPATIENT)
Facility: LOCATION | Age: 59
End: 2024-04-09
Attending: ORTHOPAEDIC SURGERY
Payer: COMMERCIAL

## 2024-04-11 ENCOUNTER — APPOINTMENT (OUTPATIENT)
Facility: LOCATION | Age: 59
End: 2024-04-11
Attending: ORTHOPAEDIC SURGERY
Payer: COMMERCIAL

## 2024-04-16 ENCOUNTER — APPOINTMENT (OUTPATIENT)
Facility: LOCATION | Age: 59
End: 2024-04-16
Attending: ORTHOPAEDIC SURGERY
Payer: COMMERCIAL

## 2024-04-18 ENCOUNTER — APPOINTMENT (OUTPATIENT)
Facility: LOCATION | Age: 59
End: 2024-04-18
Attending: ORTHOPAEDIC SURGERY
Payer: COMMERCIAL

## 2024-04-23 ENCOUNTER — APPOINTMENT (OUTPATIENT)
Facility: LOCATION | Age: 59
End: 2024-04-23
Attending: ORTHOPAEDIC SURGERY
Payer: COMMERCIAL

## 2024-04-30 ENCOUNTER — APPOINTMENT (OUTPATIENT)
Facility: LOCATION | Age: 59
End: 2024-04-30
Attending: ORTHOPAEDIC SURGERY
Payer: COMMERCIAL

## 2024-05-07 ENCOUNTER — APPOINTMENT (OUTPATIENT)
Facility: LOCATION | Age: 59
End: 2024-05-07
Attending: ORTHOPAEDIC SURGERY
Payer: COMMERCIAL

## 2024-05-09 ENCOUNTER — APPOINTMENT (OUTPATIENT)
Facility: LOCATION | Age: 59
End: 2024-05-09
Attending: ORTHOPAEDIC SURGERY
Payer: COMMERCIAL

## 2024-05-16 ENCOUNTER — TELEMEDICINE (OUTPATIENT)
Dept: FAMILY MEDICINE CLINIC | Facility: CLINIC | Age: 59
End: 2024-05-16

## 2024-05-16 DIAGNOSIS — J01.91 ACUTE RECURRENT SINUSITIS, UNSPECIFIED LOCATION: ICD-10-CM

## 2024-05-16 DIAGNOSIS — G89.29 CHRONIC PAIN OF LEFT KNEE: Primary | ICD-10-CM

## 2024-05-16 DIAGNOSIS — M25.562 CHRONIC PAIN OF LEFT KNEE: Primary | ICD-10-CM

## 2024-05-16 PROCEDURE — 99213 OFFICE O/P EST LOW 20 MIN: CPT | Performed by: FAMILY MEDICINE

## 2024-05-16 RX ORDER — AMOXICILLIN AND CLAVULANATE POTASSIUM 875; 125 MG/1; MG/1
1 TABLET, FILM COATED ORAL 2 TIMES DAILY
Qty: 20 TABLET | Refills: 0 | Status: SHIPPED | OUTPATIENT
Start: 2024-05-16 | End: 2024-05-26

## 2024-05-16 NOTE — PROGRESS NOTES
Subjective:   Patient ID: Devin Urrutia is a 59 year old male.    HPI  Mr. Urrutia is a pleasant 59-year-old gentleman with history of asthma, hyperlipidemia, hypothyroidism, arthritis presenting for video visit today for sinus congestion for the past few days.  He has been feeling tired and has been having chills.  He has had recurrent sinusitis and also has history of allergic rhinitis and has been taking his meds for this.  He would usually take an antibiotic that would help relieve this.  He also had left knee arthroscopy earlier this year but continues to have left knee pain and would like to have a referral to his orthopedist. I had reviewed past medical and family histories together with allergy and medication lists documented.    History/Other:   Review of Systems   Respiratory:  Negative for shortness of breath.    Cardiovascular:  Negative for chest pain.   Gastrointestinal:  Negative for abdominal pain, diarrhea, nausea and vomiting.   Musculoskeletal:  Positive for arthralgias.     Current Outpatient Medications   Medication Sig Dispense Refill    amoxicillin clavulanate 875-125 MG Oral Tab Take 1 tablet by mouth 2 (two) times daily for 10 days. 20 tablet 0    alfuzosin ER 10 MG Oral Tablet 24 Hr Take 1 tablet (10 mg total) by mouth daily. 90 tablet 5    albuterol (2.5 MG/3ML) 0.083% Inhalation Nebu Soln Take 3 mL (2.5 mg total) by nebulization every 6 (six) hours as needed for Wheezing or Shortness of Breath. 100 each 3    cetirizine 5 MG Oral Tab Take 1 tablet (5 mg total) by mouth daily.      traMADol 50 MG Oral Tab Please take 1 tablet every 6 hours as needed for pain. Max of 8 tablets per day. Collaborating - Dr. Jennifer Sandoval. (Patient not taking: Reported on 3/28/2024) 20 tablet 1    Sennosides-Docusate Sodium 8.6-50 MG Oral Cap Take 1 capsule by mouth daily as needed. (Patient not taking: Reported on 3/28/2024) 30 capsule 1    ondansetron (ZOFRAN) 4 mg tablet Take 1 tablet by mouth every 8 hours as  needed for nausea. (Patient not taking: Reported on 3/28/2024) 8 tablet 0    Meloxicam 15 MG Oral Tab Take 1 tablet (15 mg total) by mouth daily. (Patient not taking: Reported on 3/28/2024) 14 tablet 1    atorvastatin 10 MG Oral Tab Take 1 tablet (10 mg total) by mouth nightly. 90 tablet 1    Fluocinolone Acetonide 0.01 % Otic Oil 2 drops each ear twice daily for 30 days, repeat as necessary 1 each 5    FLOVENT  MCG/ACT Inhalation Aerosol Inhale 2 puffs into the lungs 2 (two) times daily. 1 each 3    montelukast 10 MG Oral Tab Take 1 tablet (10 mg total) by mouth nightly. 90 tablet 1    Respiratory Therapy Supplies (NEBULIZER) Does not apply Device Use every 4-6 hours as needed 1 each 0    albuterol 108 (90 Base) MCG/ACT Inhalation Aero Soln Inhale 2 puffs into the lungs every 6 (six) hours as needed. 1 each 3    Olopatadine HCl 0.2 % Ophthalmic Solution Apply 1 drop to eye 3 (three) times daily as needed. 1 each 3    Respiratory Therapy Supplies (NEBULIZER) Does not apply Device Please use every 6 hour as needed for Shortness of breath, wheezing 1 Device 0     Allergies:No Known Allergies    Objective:   Physical Exam  Constitutional:       General: He is not in acute distress.  Neurological:      Mental Status: He is alert.         Assessment & Plan:   1. Chronic pain of left knee   -Will place new referral to orthopedist for further evaluation management   2. Acute recurrent sinusitis, unspecified location   -Continue with current over-the-counter allergy medications  - Will send for Augmentin  - Keep hydrated  - May take Tylenol as needed for fever or pain     -Call or come sooner symptoms worsen or persist    This note was prepared using Dragon Medical voice recognition dictation software. As a result errors may occur. When identified these errors have been corrected. While every attempt is made to correct errors during dictation discrepancies may still exist          No orders of the defined types were  placed in this encounter.      Meds This Visit:  Requested Prescriptions     Signed Prescriptions Disp Refills    amoxicillin clavulanate 875-125 MG Oral Tab 20 tablet 0     Sig: Take 1 tablet by mouth 2 (two) times daily for 10 days.       Imaging & Referrals:  ORTHOPEDIC - INTERNAL

## 2024-05-28 ENCOUNTER — TELEMEDICINE (OUTPATIENT)
Dept: FAMILY MEDICINE CLINIC | Facility: CLINIC | Age: 59
End: 2024-05-28

## 2024-05-28 DIAGNOSIS — J01.91 ACUTE RECURRENT SINUSITIS, UNSPECIFIED LOCATION: Primary | ICD-10-CM

## 2024-05-28 PROCEDURE — 99213 OFFICE O/P EST LOW 20 MIN: CPT | Performed by: FAMILY MEDICINE

## 2024-05-28 RX ORDER — DOXYCYCLINE HYCLATE 100 MG
100 TABLET ORAL 2 TIMES DAILY
Qty: 20 TABLET | Refills: 0 | Status: SHIPPED | OUTPATIENT
Start: 2024-05-28 | End: 2024-06-07

## 2024-05-28 RX ORDER — METHYLPREDNISOLONE 4 MG/1
TABLET ORAL
Qty: 1 EACH | Refills: 0 | Status: SHIPPED | OUTPATIENT
Start: 2024-05-28

## 2024-05-28 NOTE — PROGRESS NOTES
Subjective:   Patient ID: Devin Urrutia is a 59 year old male.    HPI  Mr. Urrutia is a very pleasant 59-year-old gentleman with history of asthma, allergic rhinitis, sinusitis presenting for video visit today.  Last week he was treated with Augmentin for sinus infection.  He continues to have congestion involving his sinuses and ears.  Cough is productive of phlegm.  He has been sneezing more.  He feels that he is not better. I had reviewed past medical and family histories together with allergy and medication lists documented.      History/Other:   Review of Systems   Constitutional:  Negative for fatigue and fever.   Cardiovascular:  Negative for chest pain.   Gastrointestinal:  Negative for abdominal pain, diarrhea, nausea and vomiting.   Neurological:  Negative for dizziness.     Current Outpatient Medications   Medication Sig Dispense Refill    Doxycycline Hyclate 100 MG Oral Tab Take 1 tablet (100 mg total) by mouth 2 (two) times daily for 10 days. 20 tablet 0    methylPREDNISolone (MEDROL) 4 MG Oral Tablet Therapy Pack As directed. 1 each 0    alfuzosin ER 10 MG Oral Tablet 24 Hr Take 1 tablet (10 mg total) by mouth daily. 90 tablet 5    albuterol (2.5 MG/3ML) 0.083% Inhalation Nebu Soln Take 3 mL (2.5 mg total) by nebulization every 6 (six) hours as needed for Wheezing or Shortness of Breath. 100 each 3    cetirizine 5 MG Oral Tab Take 1 tablet (5 mg total) by mouth daily.      traMADol 50 MG Oral Tab Please take 1 tablet every 6 hours as needed for pain. Max of 8 tablets per day. Collaborating - Dr. Jennifer Sandoval. (Patient not taking: Reported on 3/28/2024) 20 tablet 1    Sennosides-Docusate Sodium 8.6-50 MG Oral Cap Take 1 capsule by mouth daily as needed. (Patient not taking: Reported on 3/28/2024) 30 capsule 1    ondansetron (ZOFRAN) 4 mg tablet Take 1 tablet by mouth every 8 hours as needed for nausea. (Patient not taking: Reported on 3/28/2024) 8 tablet 0    Meloxicam 15 MG Oral Tab Take 1 tablet (15 mg  total) by mouth daily. (Patient not taking: Reported on 3/28/2024) 14 tablet 1    atorvastatin 10 MG Oral Tab Take 1 tablet (10 mg total) by mouth nightly. 90 tablet 1    Fluocinolone Acetonide 0.01 % Otic Oil 2 drops each ear twice daily for 30 days, repeat as necessary 1 each 5    FLOVENT  MCG/ACT Inhalation Aerosol Inhale 2 puffs into the lungs 2 (two) times daily. 1 each 3    montelukast 10 MG Oral Tab Take 1 tablet (10 mg total) by mouth nightly. 90 tablet 1    Respiratory Therapy Supplies (NEBULIZER) Does not apply Device Use every 4-6 hours as needed 1 each 0    albuterol 108 (90 Base) MCG/ACT Inhalation Aero Soln Inhale 2 puffs into the lungs every 6 (six) hours as needed. 1 each 3    Olopatadine HCl 0.2 % Ophthalmic Solution Apply 1 drop to eye 3 (three) times daily as needed. 1 each 3    Respiratory Therapy Supplies (NEBULIZER) Does not apply Device Please use every 6 hour as needed for Shortness of breath, wheezing 1 Device 0     Allergies:No Known Allergies    Objective:   Physical Exam  Neurological:      Mental Status: He is alert.   Psychiatric:         Mood and Affect: Mood normal.         Behavior: Behavior normal.         Assessment & Plan:   1. Acute recurrent sinusitis, unspecified location    -Continue with allergy medications  - May take Tylenol as needed for fever or pain  - We will treat with doxycycline and hold amoxicillin  - Will also treat with Medrol Dosepak  - Call or come in sooner symptoms worsen or persist      This note was prepared using Dragon Medical voice recognition dictation software. As a result errors may occur. When identified these errors have been corrected. While every attempt is made to correct errors during dictation discrepancies may still exist            No orders of the defined types were placed in this encounter.      Meds This Visit:  Requested Prescriptions     Signed Prescriptions Disp Refills    Doxycycline Hyclate 100 MG Oral Tab 20 tablet 0     Sig:  Take 1 tablet (100 mg total) by mouth 2 (two) times daily for 10 days.    methylPREDNISolone (MEDROL) 4 MG Oral Tablet Therapy Pack 1 each 0     Sig: As directed.       Imaging & Referrals:  None

## 2024-06-10 ENCOUNTER — TELEMEDICINE (OUTPATIENT)
Dept: FAMILY MEDICINE CLINIC | Facility: CLINIC | Age: 59
End: 2024-06-10
Payer: COMMERCIAL

## 2024-06-10 DIAGNOSIS — R06.02 SOB (SHORTNESS OF BREATH): Primary | ICD-10-CM

## 2024-06-10 DIAGNOSIS — J45.30 MILD PERSISTENT ASTHMA WITHOUT COMPLICATION (HCC): ICD-10-CM

## 2024-06-10 RX ORDER — PREDNISONE 20 MG/1
TABLET ORAL
Qty: 11 TABLET | Refills: 0 | Status: SHIPPED | OUTPATIENT
Start: 2024-06-10 | End: 2024-06-19

## 2024-06-10 NOTE — PROGRESS NOTES
Subjective:   Patient ID: Devin Urrutia is a 59 year old male.    HPI  Mr. Urrutia is a very pleasant 59-year-old gentleman with known history of hypothyroidism, asthma, sinusitis, allergic rhinitis, arthritis presenting today for video visit for management of asthma.  Over the past few weeks he has been treated with antibiotics and steroids for sinusitis and upper respiratory tract infection.  He did feel better briefly with recent antibiotic and Medrol Dosepak that was given by me.  However he has been having mild shortness of breath and congestion.  He would like to see his allergist who has seen him in the past for management of his asthma.  He has been taking his medications compliantly.  No fever no cough no chest pain no palpitations no nausea no vomiting no abdominal pain. I had reviewed past medical and family histories together with allergy and medication lists documented.    History/Other:   Review of Systems   Constitutional:  Negative for fever.   Gastrointestinal:  Negative for abdominal pain, diarrhea, nausea and vomiting.     Current Outpatient Medications   Medication Sig Dispense Refill    predniSONE 20 MG Oral Tab Take 1 tablet (20 mg total) by mouth 2 (two) times daily for 3 days, THEN 1 tablet (20 mg total) daily for 3 days, THEN 0.5 tablets (10 mg total) daily for 3 days. 11 tablet 0    methylPREDNISolone (MEDROL) 4 MG Oral Tablet Therapy Pack As directed. 1 each 0    alfuzosin ER 10 MG Oral Tablet 24 Hr Take 1 tablet (10 mg total) by mouth daily. 90 tablet 5    albuterol (2.5 MG/3ML) 0.083% Inhalation Nebu Soln Take 3 mL (2.5 mg total) by nebulization every 6 (six) hours as needed for Wheezing or Shortness of Breath. 100 each 3    cetirizine 5 MG Oral Tab Take 1 tablet (5 mg total) by mouth daily.      traMADol 50 MG Oral Tab Please take 1 tablet every 6 hours as needed for pain. Max of 8 tablets per day. Collaborating - Dr. Jennifer Sandoval. (Patient not taking: Reported on 3/28/2024) 20 tablet 1     Sennosides-Docusate Sodium 8.6-50 MG Oral Cap Take 1 capsule by mouth daily as needed. (Patient not taking: Reported on 3/28/2024) 30 capsule 1    ondansetron (ZOFRAN) 4 mg tablet Take 1 tablet by mouth every 8 hours as needed for nausea. (Patient not taking: Reported on 3/28/2024) 8 tablet 0    Meloxicam 15 MG Oral Tab Take 1 tablet (15 mg total) by mouth daily. (Patient not taking: Reported on 3/28/2024) 14 tablet 1    atorvastatin 10 MG Oral Tab Take 1 tablet (10 mg total) by mouth nightly. 90 tablet 1    Fluocinolone Acetonide 0.01 % Otic Oil 2 drops each ear twice daily for 30 days, repeat as necessary 1 each 5    FLOVENT  MCG/ACT Inhalation Aerosol Inhale 2 puffs into the lungs 2 (two) times daily. 1 each 3    montelukast 10 MG Oral Tab Take 1 tablet (10 mg total) by mouth nightly. 90 tablet 1    Respiratory Therapy Supplies (NEBULIZER) Does not apply Device Use every 4-6 hours as needed 1 each 0    albuterol 108 (90 Base) MCG/ACT Inhalation Aero Soln Inhale 2 puffs into the lungs every 6 (six) hours as needed. 1 each 3    Olopatadine HCl 0.2 % Ophthalmic Solution Apply 1 drop to eye 3 (three) times daily as needed. 1 each 3    Respiratory Therapy Supplies (NEBULIZER) Does not apply Device Please use every 6 hour as needed for Shortness of breath, wheezing 1 Device 0     Allergies:No Known Allergies    Objective:   Physical Exam  Constitutional:       General: He is not in acute distress.  Neurological:      Mental Status: He is alert.   Psychiatric:         Mood and Affect: Mood normal.         Behavior: Behavior normal.         Assessment & Plan:   1. SOB (shortness of breath)    2. Mild persistent asthma without complication (HCC)    -We will refer him to his allergist  - May need to do pulmonary function test but will defer to specialty  - Will order chest x-ray  - Will start on prednisone taper  - Go to the emergency room if with respiratory distress  - Continue medication regimen    This note was  prepared using Dragon Medical voice recognition dictation software. As a result errors may occur. When identified these errors have been corrected. While every attempt is made to correct errors during dictation discrepancies may still exist            No orders of the defined types were placed in this encounter.      Meds This Visit:  Requested Prescriptions     Signed Prescriptions Disp Refills    predniSONE 20 MG Oral Tab 11 tablet 0     Sig: Take 1 tablet (20 mg total) by mouth 2 (two) times daily for 3 days, THEN 1 tablet (20 mg total) daily for 3 days, THEN 0.5 tablets (10 mg total) daily for 3 days.       Imaging & Referrals:  ALLERGY - INTERNAL  XR CHEST PA + LAT CHEST (CPT=71046)

## 2024-06-12 ENCOUNTER — OFFICE VISIT (OUTPATIENT)
Dept: RHEUMATOLOGY | Facility: CLINIC | Age: 59
End: 2024-06-12
Payer: COMMERCIAL

## 2024-06-12 VITALS
BODY MASS INDEX: 26.04 KG/M2 | TEMPERATURE: 98 F | HEART RATE: 94 BPM | OXYGEN SATURATION: 96 % | SYSTOLIC BLOOD PRESSURE: 102 MMHG | DIASTOLIC BLOOD PRESSURE: 70 MMHG | RESPIRATION RATE: 16 BRPM | WEIGHT: 186 LBS | HEIGHT: 71 IN

## 2024-06-12 DIAGNOSIS — M62.830 LUMBAR PARASPINAL MUSCLE SPASM: ICD-10-CM

## 2024-06-12 DIAGNOSIS — M15.9 PRIMARY OSTEOARTHRITIS INVOLVING MULTIPLE JOINTS: Primary | ICD-10-CM

## 2024-06-12 DIAGNOSIS — M79.18 MYOFASCIAL PAIN DYSFUNCTION SYNDROME: ICD-10-CM

## 2024-06-12 PROBLEM — M15.0 PRIMARY OSTEOARTHRITIS INVOLVING MULTIPLE JOINTS: Status: ACTIVE | Noted: 2024-06-12

## 2024-06-12 PROCEDURE — 99205 OFFICE O/P NEW HI 60 MIN: CPT | Performed by: INTERNAL MEDICINE

## 2024-06-12 RX ORDER — MELOXICAM 15 MG/1
15 TABLET ORAL DAILY
Qty: 30 TABLET | Refills: 2 | Status: SHIPPED | OUTPATIENT
Start: 2024-06-12

## 2024-06-12 RX ORDER — GABAPENTIN 100 MG/1
300 CAPSULE ORAL NIGHTLY
Qty: 90 CAPSULE | Refills: 2 | Status: SHIPPED | OUTPATIENT
Start: 2024-06-12

## 2024-06-12 NOTE — PROGRESS NOTES
[facility], [address] [place] [address]      Consult     Devni Urrutia Patient Status:  No patient class for patient encounter    1965 MRN [id number]   Location [facility], [address] [place] [address] Attending No att. providers found   Hosp Day # 0 PCP Braxton Rivas MD     Referring Provider: PCP    Reason for Consultation: Hand pain neuropathy spasm and chronic knee pain    Subjective:    Devin Urrutia is a 59 year old male with more frequent episodes of pain in hands and joints in general    More pain stiffness; no burning no achiness. Stiffness     Some pain and stiffness on and off in shoulders, elbows , wrists or hands    On and off  knee pain; low back; hip pain    Denies any major issues with her neck    Aleve sometimes (1-2 times a week) 30 percent (knees)     MRI knee (done) in ) tried cortisone and synvisc (injection) not helping at all    Around 2 years ago. 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 (No stress test)     History of smoking ( quit 25 years ago) 1 ppd x 15-20 years) (PFTS) pending (flovent) prohair)     States no urinary or bowel symptoms; bloody stools (colonoscopy was normal 3 yearse ago)     States no headaches jaw pain, vision changes    States no history of pericardial, pleural effusions    States no significant dry eyes or dry mouth (mild) (eyes checked once a year)     States no history of uveitis iritis scleritis    States no history of Raynaud's or digital ulcerations    States no major weight changes; night  sweats;     Wt Readings from Last 6 Encounters:   06/12/24 186 lb (84.4 kg)   03/12/24 185 lb 3.2 oz (84 kg)   01/31/24 181 lb (82.1 kg)   12/06/23 181 lb (82.1 kg)   10/23/23 181 lb (82.1 kg)   10/04/23 181 lb (82.1 kg)         Her weight has been stable    States no history of photosensitive or malar rash.    States no history of psoriasis    Denies any depression anxiety or insomnia ;  3 kids (22, 18, 16) no medical issues      History/Other:      Past Medical History:  Past Medical History:    Allergic rhinitis    Asthma (HCC)    High cholesterol    Hyperlipidemia    Visual impairment        Past Surgical History:   Past Surgical History:   Procedure Laterality Date    Colonoscopy         Social History:  reports that he has quit smoking. He has quit using smokeless tobacco. He reports that he does not drink alcohol and does not use drugs.    Family History:   Family History   Problem Relation Age of Onset    Heart Disorder Father     Hypertension Mother     Heart Disorder Mother        Allergies: No Known Allergies    Current Medications:  Current Outpatient Medications   Medication Sig Dispense Refill    Meloxicam 15 MG Oral Tab Take 1 tablet (15 mg total) by mouth daily. 30 tablet 2    gabapentin 100 MG Oral Cap Take 3 capsules (300 mg total) by mouth nightly. 100mg at bedtime x 1 week then increase to 200mg at bedtime x 1 week then 300mg at bedtime 90 capsule 2    alfuzosin ER 10 MG Oral Tablet 24 Hr Take 1 tablet (10 mg total) by mouth daily. 90 tablet 5    cetirizine 5 MG Oral Tab Take 1 tablet (5 mg total) by mouth daily.      atorvastatin 10 MG Oral Tab Take 1 tablet (10 mg total) by mouth nightly. 90 tablet 1    Fluocinolone Acetonide 0.01 % Otic Oil 2 drops each ear twice daily for 30 days, repeat as necessary 1 each 5    FLOVENT  MCG/ACT Inhalation Aerosol Inhale 2 puffs into the lungs 2 (two) times daily. 1 each 3    montelukast 10 MG Oral Tab Take 1 tablet (10 mg total) by mouth  nightly. 90 tablet 1    Olopatadine HCl 0.2 % Ophthalmic Solution Apply 1 drop to eye 3 (three) times daily as needed. 1 each 3    Respiratory Therapy Supplies (NEBULIZER) Does not apply Device Please use every 6 hour as needed for Shortness of breath, wheezing 1 Device 0      No current facility-administered medications for this visit.     (Not in a hospital admission)      Review of Systems:     Constitutional: Negative for chills, , fatigue, fever and unexpected weight change.    HENT: Negative for congestion, and mouth sores.    Eyes: Negative for photophobia, pain, redness and visual disturbance.    Respiratory: Negative for apnea, cough, chest tightness, shortness of breath, wheezing and stridor.    Cardiovascular: Negative for chest pain, palpitations and leg swelling.    Gastrointestinal: Negative for abdominal distention, abdominal pain, blood in stool, constipation, diarrhea and nausea.    Endocrine: Negative.     Genitourinary: Negative for decreased urine volume, difficulty urinating, dyspareunia, dysuria, flank pain, and frequency.    Musculoskeletal: + arthralgias, no gait problem and joint swelling.    Skin: Negative for color change, pallor and rash. No raynauds or digital ulcerations no sclerodactly.    Allergic/Immunologic: Negative.    Neurological: Negative for dizziness, tremors, seizures, syncope, speech difficulty, weakness, light-headedness, +numbness and no headaches.    Hematological: Does not bruise/bleed easily.    Psychiatric/Behavioral: Negative for confusion, decreased concentration, hallucinations, self-injury, sleep disturbance and suicidal ideas or depression.    Objective:   Vitals:    06/12/24 1102   BP: 102/70   Pulse: 94   Resp: 16   Temp: 98.1 °F (36.7 °C)          Constitutional: is oriented to person, place, and time. Appears well-developed and well-nourished. No distress.    HEENT: Normocephalic; EOMI; no jvd; no LAD; no oral or nasal ulcers.     Eyes: Conjunctivae and EOM  are normal. Pupils are equal, round, and reactive to light.     Neck: Normal range of motion. No thyromegaly present.    Cardiovascular: RRR, no murmurs.    Lungs: Clear, Bilateral air entry, no wheezes.    Abdominal: Soft.    Musculoskeletal:         Joint Exam 06/12/2024        Right  Left   Sternoclavicular   Tender   Tender   Glenohumeral   Tender   Tender   Elbow   Tender   Tender   Thoracic Spine   Tender      Lumbar Spine   Tender      Sacroiliac   Tender   Tender   Hip   Tender   Tender   Knee   Tender   Tender        Swollen: 0      Tender: 14          Right shoulder: Exhibits normal range of motion on abduction and internal rotation, no tenderness, no bony tenderness, no deformity, no laceration, no pain and no spasm.        Left shoulder: Exhibits normal range of motion on abduction and internal and external rotation.  no tenderness, no bony tenderness, no swelling, no effusion, no deformity, no pain, no spasm and normal strength.        Right elbow:  Exhibits normal range of motion, no swelling, no effusion and no deformity. No tenderness found. No medial epicondyle, no lateral epicondyle and no olecranon process tenderness noted. There are no contractures or tophi or nodules.        Left elbow:  Normal range of motion, no swelling, no effusion and no deformity. No medial epicondyle, no lateral epicondyle and no olecranon process tenderness noted. There are no contractures or tophi or nodules.        Right wrist:  Exhibits normal range of motion, no tenderness, no bony tenderness, no swelling, no effusion and no crepitus. Flexion and extension intact w/o limitation.        Left wrist: Exhibits normal range of motion, no tenderness, no bony tenderness, no swelling, no effusion, no crepitus and no deformity. Flexion and extension intact without limitation.        Right hip: Exhibits normal range of motion, normal strength, no tenderness, no bony tenderness, no swelling and no crepitus.        Right hand:  No synovitis of MCP,PIP or DIP joints; no Bouchards very small scattered Heberden nodules noted;  strength: 100%.  Mild squaring first CMC joint        Left hand: No synovitis of MCP,PIP or DIP joints; no Bouchards very small scattered Heberden nodules noted;  strength: 100%.  Mild squaring first CMC joint        Left hip: Exhibits normal range of motion, normal strength, no tenderness, no bony tenderness, No swelling and no crepitus.        Right knee: Exhibits normal range of motion, no swelling, no effusion, no ecchymosis, no deformity and no erythema. No tenderness found. No medial joint line, no lateral joint line, no MCL and no LCL tenderness noted. mod crepitation on flexion of knee and extension normal.        Left knee:  Exhibits normal range of motion, no swelling, no effusion, no ecchymosis and no erythema. No tenderness found. No medial joint line, no lateral joint line and no patellar tendon tenderness noted. mod crepitation on flexion of the knee. Extension intact and normal.        Right ankle: No swelling, no deformity. No tenderness. Dorsiflexion and plantar flexion intact without limitation in range of motion.        Left ankle: Exhibits no swelling. No tenderness. No lateral malleolus and no medial malleolus tenderness found. Achilles tendon normal. Achilles tendon exhibits no pain, no defect and normal Arreola's test results.  Dorsiflexion and plantar flexion intact without limitation in range of motion.        Cervical back: Exhibits normal range of motion, no tenderness, no bony tenderness, no swelling, + pain and no spasm.        Thoracic back: Exhibits normal range of motion, no tenderness, no bony tenderness and + spasm.        Lumbar back:  Exhibits normal range of motion, no tenderness, no bony tenderness, no pain and + spasm.  Significant limitation forward flexion        Right foot: normal. There is normal range of motion, no tenderness, no bony tenderness, no crepitus and no  laceration. There is no synovitis or tenderness of the MTP joints to palpation.  Bony enlargement first MTP joint        Left foot: normal. There is normal range of motion, no tenderness, no bony tenderness and no crepitus. There is no synovitis or tenderness of the MTP joints to palpation.  Bony enlargement first MTP joint    Lymphadenopathy: No submental, no submandibular, and no occipital adenopathy present, has no cervical adenopathy or axillary lympadenopathy.    Neurological: Alert and oriented. No focal motor or sensory abnormalities. Strength is 5/5 Upper Extremities/Lower Extremities proximally and distally.    Skin: Skin is warm, dry and intact.  No rashes no purpuric petechial lesions    Psychiatric: Normal behavior. Nonrestorsive sleep; no fatigue     Results:    Labs:      Lab Results   Component Value Date    WBC 7.2 01/23/2024    RBC 5.01 01/23/2024    HGB 15.2 01/23/2024    HCT 44.3 01/23/2024    MCV 88.4 01/23/2024    MCH 30.3 01/23/2024    MCHC 34.3 01/23/2024    RDW 12.9 01/23/2024    .0 01/23/2024       No components found for: \"RELY\", \"NMET\", \"MYEL\", \"PROMY\", \"JUICE\", \"ABSNEUTS\", \"ABSBANDS\", \"ABMM\", \"ABMY\", \"ABPM\", \"ABBL\"      Lab Results   Component Value Date     01/23/2024    K 4.1 01/23/2024    CO2 29.0 01/23/2024    BUN 20 01/23/2024    ALB 3.8 01/23/2024    AST 27 01/23/2024    ALT  01/23/2024      Comment:      Due to  backorder we are temporarily unable to offer hospital-based ALT testing at [facility].   If urgently needed, please order ALT test code [id number].   The new order will need a new venipuncture and will be sent to [facility] for testing.   The expected turnaround time will be within 24 hours.           No components found for: \"ESRWESTERGRN\"       No results found for: \"CRP\"      Lab Results   Component Value Date    CLARITY Clear 01/23/2024    UROBILINOGEN Normal 01/23/2024     @LABRCNTIP(RF,B12)@      [unfilled]    Component  Ref Range & Units  4/1/21  9:51 AM   Allergen A. Alternata  <0.10 kUA/L <0.10   Allergen Aspergillus Fumigatus  <0.10 kUA/L <0.10   Allergen B[place] Grass  <0.10 kUA/L 0.20 High    Allergen Box Elder  <0.10 kUA/L <0.10   Allergen C. Herbarum  <0.10 kUA/L <0.10   Allergen Cat Dander  <0.10 kUA/L 12.00 High    Allergen Cockroach  <0.10 kUA/L 1.15 High    Allergen Common Pigweed  <0.10 kUA/L <0.10   Allergen Common Ragweed  <0.10 kUA/L <0.10   Allergen [place] Tree  <0.10 kUA/L 0.12 High    Allergen D. Farinae  <0.10 kUA/L <0.10   Allergen D. Pteronyssinus  <0.10 kUA/L <0.10   Allergen Dog Dander  <0.10 kUA/L 0.52 High    Allergen Elm Tree  <0.10 kUA/L 0.20 High    Allergen Simons Elder  <0.10 kUA/L 0.21 High    Allergen Mouse Epithelium  <0.10 kUA/L <0.10   Allergen [place]  <0.10 kUA/L <0.10   Allergen Mucor Racemosus  <0.10 kUA/L <0.10   Allergen [place]  <0.10 kUA/L <0.10   Allergen [place] Tree  <0.10 kUA/L 0.10 High    Allergen Pecan/Hickory  <0.10 kUA/L 0.14 High    Allergen Penicillium Notatum  <0.10 kUA/L <0.10   Allergen Russian Thistle  <0.10 kUA/L <0.10   Allergen [place]reek  <0.10 kUA/L 0.14 High    Allergen [place] Grass  <0.10 kUA/L 1.05 High    Allergen [place] Tree  <0.10 kUA/L 0.16 High    Allergen [place]  <0.10 kUA/L 0.92 High    Immunoglobulin E  2.00 - 214.00 kU/L 277.00 High    Resulting Agency [facility] ([facility])              Narrative  Performed by: [facility] ([facility])  Reference Ranges:  Specific IgE Class    Class 0      <0.10           No significant level detected    Class 0/1    0.1-0.34        Clinical relevance undetermined    Class 1      0.35-0.70       Low    Class 2      0.71-3.50       Moderate    Class 3      3.51-17.50      High    Class 4      17.51-50.00     Very High    Class 5      50.1-100.00     Very High    Class 6      >100.00         Very High        omponent  Ref Range & Units 2/28/20 11:23 AM   Uric Acid  3.5 - 7.2 mg/dL 5.2     Component  Ref Range & Units 12/23/19 10:44 AM   Tanya  Screen  Negative Negative   Comment: Negative for the following EN     Imaging:  No results found.    Narrative   PROCEDURE:  XR HAND (MIN 3 VIEWS) BILAT (CPT=73130)     TECHNIQUE:  A total of 6 views were obtained. Three views of the right hand and three views of the left hand.     COMPARISON:  None.     INDICATIONS:  M79.642 Pain in both hands M79.641 Pain in both hands     PATIENT STATED HISTORY: (As transcribed by Technologist)  Pt c/o pain on the dorsal side of both hand for 10 days, he also has h/o arthritis.         FINDINGS:  No significant erosive changes are identified.  No acute displaced osseous fracture is seen.  No significant soft tissue swelling is identified.  Mild osteoarthritis changes are seen at the bilateral 1st carpometacarpal joints, left MCP joint  and left 2nd MCP joint.  There are minimal scattered degenerative changes within the DIP joints.                   Impression   CONCLUSION:  No acute osseous abnormality is evident.  Mild degenerative changes in the hands.        LOCATION:  [facility]        Narrative   PROCEDURE:  MRI KNEE, LEFT (JTB=86713)     LOCATION:  [facility]       COMPARISON:  None.     INDICATIONS:  G89.29 Chronic pain of left knee M25.562 Chronic pain of left knee S80.02XD Contusion of left knee, subsequent encounter     TECHNIQUE:  Axial, coronal, and sagittal proton density with and without fat saturation images were obtained.     PATIENT STATED HISTORY: (As transcribed by Technologist)  The patient reports a fall five months ago. Now with bilateral medial knee pain.         FINDINGS:    LIGAMENTS:          The ACL, PCL, patellar retinacula, and collateral ligament complexes are intact.  MENISCI:            The medial and lateral menisci are intact without evidence of tear or significant degenerative change.  TENDONS:            The tendinous insertions about the knee are intact without significant tendinosis or tears.  MUSCULATURE:        No evidence of strain, edema, or  atrophy.  BONY COMPARTMENTS:  There is no evidence of acute osseous injuries.  There is mild patellofemoral joint osteoarthritis.  There is mild chondromalacia of the femoral tibial compartments.  SYNOVIUM:           There is a mild suprapatellar knee joint effusion.  There is suggestion of 2 ossified intra-articular bodies within the posterior medial joint space, measuring 5 mm and 4 mm.  These are best seen on sagittal fat sat PD images 16 and  17 of series 6.                      Impression   CONCLUSION:    1. Mild patellofemoral joint osteoarthritis and mild chondromalacia of the femoral tibial compartments.  Associated mild joint effusion and suggestion of 2 ossified intra-articular bodies within the posterior medial joint space measuring 5 and 4 mm  respectively.  2. No evidence of meniscal injuries.    3. No acute process.           Narrative   PROCEDURE:  MRI KNEE, RIGHT (CUI=68351)     LOCATION:  [facility]       COMPARISON:  [facility], XR, XR KNEE (1 OR 2 VIEWS), RIGHT (CPT=73560), 6/25/2022, 9:27 AM.     INDICATIONS:  Status post fall 5 months ago with persisting knee pain.     TECHNIQUE:  Axial, coronal, and sagittal proton density with and without fat saturation images were obtained.     PATIENT STATED HISTORY: (As transcribed by Technologist)  The patient reports a fall five months ago. Now with bilateral medial knee pain.         FINDINGS:    LIGAMENTS:          The ACL is intact and unremarkable.  The PCL reveals thickening and intermediate grade signal involving the proximal to mid fibers consistent with a moderate grade PCL sprain.  The collateral ligaments are intact with thickening of  the proximal, superficial MCL without abnormal signal, consistent with low-grade chronic sprain.  Patellofemoral ligaments are unremarkable.  MENISCI:            The medial and lateral menisci are intact without evidence of tear or significant degenerative change.  TENDONS:            There is mild distal quadriceps  tendinosis and enthesopathy.  No high-grade tendinopathy noted.  MUSCULATURE:        No evidence of strain, edema, or atrophy.  BONY COMPARTMENTS:  There is evidence of mild tricompartmental osteoarthritis with multiple small foci of low to moderate grade articular cartilage loss within all 3 compartments of the knee.  SYNOVIUM:           Mild joint effusion.                      Impression   CONCLUSION:    1. There is a moderate grade PCL sprain involving the proximal to mid fibers.  2. Mild tricompartmental osteoarthritis and mild joint effusion.  3. Low-grade chronic sprain of the proximal, superficial aspect of the MCL.        Dictated by (CST): Gianna Kennedy DO on 10/24/2022 at 11:57 AM      Finalized by (CST): Gianna Kennedy DO on 10/24/2022 at 12:01 PM     PROCEDURE:  XR FOOT, COMPLETE (MIN 3 VIEWS), RIGHT (CPT=73630)     TECHNIQUE:  AP, oblique, and lateral views were obtained.     COMPARISON:  None.     INDICATIONS:  M79.671 Pain in right foot     PATIENT STATED HISTORY: (As transcribed by Technologist)  Patient states pain to lateral side of right foot and fifth digit for one day. Patient is having difficulty walking. Previous hairline fracture near fifth digit 18 years ago.          Impression   CONCLUSION:    No fracture, dislocation, deformity, other acute process.  Mild degenerative disc changes at the 1st metatarsophalangeal joint with mild joint narrowing and osteophytes.  No hallux valgus or significant bunion.  No soft tissue  abnormalities. Plantar calcaneal spur, and spurring at the insertion of the Achilles tendon onto the calcaneus.      Dictated by: Renard Cruz MD on 2/28/2020 at 12:00      Approved by: Renard Cruz MD on 2/28/2020 at 12:0       Assessment & Plan:      59-year-old gentleman comes in for further evaluation for arthralgias primarily generalized arthralgias with chronic knee pain    Moderate to severe arthritis of the knees with chronic knee pain followed by Ortho   Mild  osteoarthritis multiple joints  Hand pain could be related to neuropathy carpal tunnel syndrome  Lumbar cervical spasm  BILateral hamstring tightness  Myofascial pain syndrome with muscle spasm tightness nonrestorative sleep fatigue neuropathy    Patient has no obvious synovitis on exam or history concerning for connective tissue disease    Offered gabapentin for nonrestorative sleep fatigue neuropathy.  Risk side effects discussed.  Patient is willing to consider this we will get baseline x-rays of the cervical lumbar spine pelvis and hand and knee x-rays to look for degree of arthritis stenosis    Potential risks and side effects discussed written information given to the patient will start 100 mg at bedtime for a week then 200 mg at bedtime for a week then 300 mg at bedtime for a week as tolerated refer to physical therapy for stretching and exercises.  Asked to dive or operate heavy machinery unless the patient knows how this medication makes him feel    His chronic shortness of breath asthma history of smoking COPD and cardiac history would suggest seeing his PCP to consider stress test and making sure there is no cardiac reason for her his shortness of breath and proceeding with pulmonary function test as planned.    As we have labs and x-rays will have more definitive plan of treatment likely be referred to pain management or Ortho depending on the results of the imaging    Offered meloxicam 15 mg daily risk of NSAIDs discussed asked him to not take other NSAIDs with this    X-rays were unrevealing of the hands consider EMG to rule out carpal tunnel syndrome or cervical disc disease and neuropathy related to this.    If autoimmune workup is unrevealing we will see him back in 4 months he will need to see his PCP in between visits.    He has failed Synvisc injections and steroid injections from Ortho in the past.  He likely will need to go back to them at some point depending on the results of the imaging and  testing for surgical options.  Recently had left knee arthroscopic surgery with no improvement by Ortho    Education and counseling provided to patient.  Instructed patient to call my office or seek medical attention immediately if symptoms worsen. Risks and side effects of medications and diagnosis discussed in detail and patient was given written information on new prescribed medications.    Return to clinic:  Return in about 4 months (around 10/12/2024).    Morena Olguin MD  6/12/2024

## 2024-06-12 NOTE — PATIENT INSTRUCTIONS
OSTEOARTHRITIS    Fast Facts    Though some of the joint changes are irreversible, most patients will not need joint replacement surgery.    OA symptoms (what you feel) can vary greatly among patients.    A rheumatologist can detect arthritis and prescribe the proper treatment. The goal of treatment in OA is to reduce pain and improve function.    Exercise is an important part of OA treatment, because it can decrease joint pain and improve function.    At present, there is no treatment that can reverse the damage of OA in the joints. Researchers are trying to find ways to slow or reverse this joint damage.    Osteoarthritis (also known as OA) is a common joint disease that most often affects middle-age to elderly people. It is commonly referred to as \"wear and tear\" of the joints, but we now know that OA is a disease of the entire joint, involving the cartilage, joint lining, ligaments, and bone. Although it is more common in older people, it is not really accurate to say that the joints are just \"wearing out.\" It is characterized by breakdown of the cartilage (the tissue that cushions the ends of the bones between joints), bony changes of the joints, deterioration of tendons and ligaments, and various degrees of inflammation of the joint lining (called the synovium).    This arthritis tends to occur in the hand joints, spine, hips, knees, and great toes. The lifetime risk of developing OA of the knee is about 46%, and the lifetime risk of developing OA of the hip is 25%, according to the [facility] Osteoarthritis Project, a long-term study from the [facility] and sponsored by the Centers for Disease Control and Prevention (often called the CDC) and the National Institutes of Health.    OA is a top cause of disability in older people. The goal of osteoarthritis treatment is to reduce pain and improve function. There is no cure for the disease, but some treatments attempt to slow disease  progression.         What is osteoarthritis?    OA is a frequently slowly progressive joint disease typically seen in middle-aged to elderly people. In osteoarthritis, the cartilage between the bones in the joint breaks down. This causes the affected bones to slowly get bigger. The joint cartilage often breaks down because of mechanical stress or biochemical changes within the body, causing the bone underneath to fail. OA can occur together with other types of arthritis, such as gout or rheumatoid arthritis.    OA tends to affect commonly used joints such as the hands and spine, and the weight-bearing joints such as the hips and knees. Symptoms include:    Joint pain and stiffness    Knobby swelling at the joint    Cracking or grinding noise with joint movement    Decreased function of the joint    How do you treat osteoarthritis?    There is no proven treatment yet that can reverse joint damage from OA. The goal of osteoarthritis treatment is to reduce pain and improve function of the affected joints. Most often, this is possible with a mixture of physical measures and drug therapy and, sometimes, surgery.    Physical measures: Weight loss and exercise are useful in OA. Excess weight puts stress on your knee joints and hips and low back. For every 10 pounds of weight you lose over 10 years, you can reduce the chance of developing knee OA by up to 50 percent. Exercise can improve your muscle strength, decrease joint pain and stiffness, and lower the chance of disability due to OA. Also helpful are support (\"assistive\") devices, such as orthotics or a walking cane, that help you do daily activities. Heat or cold therapy can help relieve OA symptoms for a short time.    Certain alternative treatments such as spa (hot tub), massage, and chiropractic manipulation can help relieve pain for a short time. They can be costly, though, and require repeated treatments. Also, the long-term benefits of these alternative  (sometimes called complementary or integrative) medicine treatments are unproven but are under study.    Drug therapy: Forms of drug therapy include topical, oral (by mouth) and injections (shots). You apply topical drugs directly on the skin over the affected joints. These medicines include capsaicin cream, lidocaine and diclofenac gel. Oral pain relievers such as acetaminophen are common first treatments. So are nonsteroidal anti-inflammatory drugs (often called NSAIDs), which decrease swelling and pain.    In 2010, the government (FDA) approved the use of duloxetine (Cymbalta) for chronic (long-term) musculoskeletal pain including from OA. This oral drug is not new. It also is in use for other health concerns, such as mood disorders, nerve pain and fibromyalgia.    Patients with more serious pain may need stronger medications, such as prescription narcotics.    Joint injections with corticosteroids (sometimes called cortisone shots) or with a form of lubricant called hyaluronic acid can give months of pain relief from OA. This lubricant is given in the knee, and these shots may help delay the need for a knee replacement by a few years in some patients.    Surgery: Surgical treatment becomes an option for severe cases. This includes when the joint has serious damage, or when medical treatment fails to relieve pain and you have major loss of function. Surgery may involve arthroscopy, repair of the joint done through small incisions (cuts). If the joint damage cannot be repaired, you may need a joint replacement.    Supplements: Many over-the-counter nutrition supplements have been used for osteoarthritis treatment. Most lack good research data to support their effectiveness and safety. Among the most widely used are calcium, vitamin D and omega-3 fatty acids. To ensure safety and avoid drug interactions, consult your doctor or pharmacist before using any of these supplements. This is especially true when you are  combining these supplements with prescribed     Overview    Myofascial pain syndrome is a chronic pain disorder. In this condition, pressure on sensitive points in your muscles (trigger points) causes pain in the muscle and sometimes in seemingly unrelated parts of your body. This is called referred pain.    This syndrome typically occurs after a muscle has been contracted repetitively. This can be caused by repetitive motions used in jobs or hobbies or by stress-related muscle tension.    While nearly everyone has experienced muscle tension pain, the discomfort associated with myofascial pain syndrome persists or worsens. Treatment options include physical therapy and trigger point injections. Pain medications and relaxation techniques also can help.         Symptoms    Signs and symptoms of myofascial pain syndrome may include:    Deep, aching pain in a muscle    Pain that persists or worsens    A tender knot in a muscle    Difficulty sleeping due to pain    When to see a doctor    Make an appointment with your doctor if you experience muscle pain that doesn't go away. Nearly everyone experiences muscle pain from time to time. But if your muscle pain persists despite rest, massage and similar self-care measures, make an appointment with your doctor.    Causes    Sensitive areas of tight muscle fibers can form in your muscles after injuries or overuse. These sensitive areas are called trigger points. A trigger point in a muscle can cause strain and pain throughout the muscle. When this pain persists and worsens, doctors call it myofascial pain syndrome.    Myofascial pain syndrome is caused by a stimulus, such as muscle tightness, that sets off trigger points in your muscles. Factors that may increase your risk of muscle trigger points include:    Muscle injury. An acute muscle injury or continual muscle stress may lead to the development of trigger points. For example, a spot within or near a strained muscle may  become a trigger point. Repetitive motions and poor posture also may increase your risk.    Stress and anxiety. People who frequently experience stress and anxiety may be more likely to develop trigger points in their muscles. One theory holds that these people may be more likely to clench their muscles, a form of repeated strain that leaves muscles susceptible to trigger points.    Complications    Complications associated with myofascial pain syndrome may include:    Sleep problems. Signs and symptoms of myofascial pain syndrome may make it difficult to sleep at night. You may have trouble finding a comfortable sleep position. And if you move at night, you might hit a trigger point and awaken.    Fibromyalgia. Some research suggests that myofascial pain syndrome may develop into fibromyalgia in some people. Fibromyalgia is a chronic condition that features widespread pain. It's believed that the brains of people with fibromyalgia become more sensitive to pain signals over time. Some doctors believe myofascial pain syndrome may play a role in starting this process.

## 2024-06-13 ENCOUNTER — HOSPITAL ENCOUNTER (OUTPATIENT)
Dept: GENERAL RADIOLOGY | Age: 59
Discharge: HOME | End: 2024-06-13
Attending: INTERNAL MEDICINE
Payer: COMMERCIAL

## 2024-06-13 ENCOUNTER — TELEPHONE (OUTPATIENT)
Dept: RHEUMATOLOGY | Facility: CLINIC | Age: 59
End: 2024-06-13

## 2024-06-13 ENCOUNTER — HOSPITAL ENCOUNTER (OUTPATIENT)
Dept: GENERAL RADIOLOGY | Age: 59
Discharge: HOME | End: 2024-06-13
Attending: FAMILY MEDICINE
Payer: COMMERCIAL

## 2024-06-13 ENCOUNTER — TELEMEDICINE (OUTPATIENT)
Dept: FAMILY MEDICINE CLINIC | Facility: CLINIC | Age: 59
End: 2024-06-13
Payer: COMMERCIAL

## 2024-06-13 ENCOUNTER — TELEPHONE (OUTPATIENT)
Dept: FAMILY MEDICINE CLINIC | Facility: CLINIC | Age: 59
End: 2024-06-13

## 2024-06-13 DIAGNOSIS — M62.830 LUMBAR PARASPINAL MUSCLE SPASM: ICD-10-CM

## 2024-06-13 DIAGNOSIS — M15.9 PRIMARY OSTEOARTHRITIS INVOLVING MULTIPLE JOINTS: ICD-10-CM

## 2024-06-13 DIAGNOSIS — J45.30 MILD PERSISTENT ASTHMA WITHOUT COMPLICATION (HCC): ICD-10-CM

## 2024-06-13 DIAGNOSIS — M79.18 MYOFASCIAL PAIN DYSFUNCTION SYNDROME: ICD-10-CM

## 2024-06-13 DIAGNOSIS — J45.30 MILD PERSISTENT ASTHMA WITHOUT COMPLICATION (HCC): Primary | ICD-10-CM

## 2024-06-13 DIAGNOSIS — R06.02 SOB (SHORTNESS OF BREATH): ICD-10-CM

## 2024-06-13 PROCEDURE — 73560 X-RAY EXAM OF KNEE 1 OR 2: CPT | Performed by: INTERNAL MEDICINE

## 2024-06-13 PROCEDURE — 72040 X-RAY EXAM NECK SPINE 2-3 VW: CPT | Performed by: INTERNAL MEDICINE

## 2024-06-13 PROCEDURE — 72110 X-RAY EXAM L-2 SPINE 4/>VWS: CPT | Performed by: INTERNAL MEDICINE

## 2024-06-13 PROCEDURE — 72190 X-RAY EXAM OF PELVIS: CPT | Performed by: INTERNAL MEDICINE

## 2024-06-13 PROCEDURE — 73130 X-RAY EXAM OF HAND: CPT | Performed by: INTERNAL MEDICINE

## 2024-06-13 PROCEDURE — 99214 OFFICE O/P EST MOD 30 MIN: CPT | Performed by: FAMILY MEDICINE

## 2024-06-13 PROCEDURE — 71046 X-RAY EXAM CHEST 2 VIEWS: CPT | Performed by: FAMILY MEDICINE

## 2024-06-13 RX ORDER — ALBUTEROL SULFATE 90 UG/1
1 AEROSOL, METERED RESPIRATORY (INHALATION) EVERY 6 HOURS PRN
Qty: 1 EACH | Refills: 1 | Status: SHIPPED | OUTPATIENT
Start: 2024-06-13 | End: 2024-06-13

## 2024-06-13 RX ORDER — ALBUTEROL SULFATE 90 UG/1
1 AEROSOL, METERED RESPIRATORY (INHALATION) EVERY 6 HOURS PRN
Qty: 1 EACH | Refills: 1 | Status: SHIPPED | OUTPATIENT
Start: 2024-06-13

## 2024-06-13 NOTE — TELEPHONE ENCOUNTER
Patient calling, patient requesting for Albuterol prescription to be sent to [facility] on file. Insurance prefers for patient to use [facility].

## 2024-06-13 NOTE — TELEPHONE ENCOUNTER
Spoke with patient and he is aware of results and recommendations to start PT. His referral was mailed to him per his request.

## 2024-06-13 NOTE — PROGRESS NOTES
Subjective:   Patient ID: Devin Urrutia is a 59 year old male.    HPI  Mr. Urrutia is a very pleasant 59-year-old gentleman with known history of hypothyroidism, asthma, sinusitis, allergic rhinitis, arthritis presenting today for video visit follow-up after he had chest x-ray done which showed hyperinflation with changes possibly due to bronchitis and/or asthma.  He has been using his albuterol inhaler frequently and will need refills for this.  He continues to take Flovent.  He continues to have cough which is dry associate with congestion.  She had seen her rheumatologist yesterday for arthritis and was advised to do a stress test.  No fever no chest pain no nausea no vomiting no abdominal pain, no lightheadedness or dizziness. I had reviewed past medical and family histories together with allergy and medication lists documented.    History/Other:   Review of Systems  Constitutional:  Negative for fever.   Gastrointestinal:  Negative for abdominal pain, diarrhea, nausea and vomiting.      Current Outpatient Medications   Medication Sig Dispense Refill    albuterol 108 (90 Base) MCG/ACT Inhalation Aero Soln Inhale 1 puff into the lungs every 6 (six) hours as needed. 1 each 1    Meloxicam 15 MG Oral Tab Take 1 tablet (15 mg total) by mouth daily. 30 tablet 2    gabapentin 100 MG Oral Cap Take 3 capsules (300 mg total) by mouth nightly. 100mg at bedtime x 1 week then increase to 200mg at bedtime x 1 week then 300mg at bedtime 90 capsule 2    alfuzosin ER 10 MG Oral Tablet 24 Hr Take 1 tablet (10 mg total) by mouth daily. 90 tablet 5    cetirizine 5 MG Oral Tab Take 1 tablet (5 mg total) by mouth daily.      atorvastatin 10 MG Oral Tab Take 1 tablet (10 mg total) by mouth nightly. 90 tablet 1    Fluocinolone Acetonide 0.01 % Otic Oil 2 drops each ear twice daily for 30 days, repeat as necessary 1 each 5    FLOVENT  MCG/ACT Inhalation Aerosol Inhale 2 puffs into the lungs 2 (two) times daily. 1 each 3     montelukast 10 MG Oral Tab Take 1 tablet (10 mg total) by mouth nightly. 90 tablet 1    Olopatadine HCl 0.2 % Ophthalmic Solution Apply 1 drop to eye 3 (three) times daily as needed. 1 each 3    Respiratory Therapy Supplies (NEBULIZER) Does not apply Device Please use every 6 hour as needed for Shortness of breath, wheezing 1 Device 0     Allergies:No Known Allergies    Objective:   Physical Exam  Constitutional:       General: He is not in acute distress.  Neurological:      Mental Status: He is alert.   Psychiatric:         Mood and Affect: Mood normal.         Behavior: Behavior normal.      Assessment & Plan:   1. Mild persistent asthma without complication (HCC)   -Reviewed chest x-ray with patient  - Continue with current medication regimen  - Go to the emergency room if with severe respiratory distress  - Will refer to pulmonology for further evaluation management   2. SOB (shortness of breath)   -Likely due to #1  - However I will order stress test for him and will await results and will provide him recommendations thereafter       This note was prepared using Dragon Medical voice recognition dictation software. As a result errors may occur. When identified these errors have been corrected. While every attempt is made to correct errors during dictation discrepancies may still exist            No orders of the defined types were placed in this encounter.      Meds This Visit:  Requested Prescriptions     Signed Prescriptions Disp Refills    albuterol 108 (90 Base) MCG/ACT Inhalation Aero Soln 1 each 1     Sig: Inhale 1 puff into the lungs every 6 (six) hours as needed.       Imaging & Referrals:  PULMONARY - INTERNAL  CARD ECHO STRESS ECHO/REST AND STRESS(CPT=93350/90717 [facility] [address])

## 2024-06-13 NOTE — TELEPHONE ENCOUNTER
All the x-rays show minimal arthritis of the spine knees hips.    I suspect most of his pain is nerve and muscle related pain    Same plan with anti-inflammatory nerve regulation    Would proceed with physical therapy as we discussed    Pain management referral if his pain continues would be neck step    All his labs and autoimmune workup looks good so far we will let him know if anything else comes back concerned

## 2024-06-17 ENCOUNTER — TELEPHONE (OUTPATIENT)
Dept: FAMILY MEDICINE CLINIC | Facility: CLINIC | Age: 59
End: 2024-06-17

## 2024-06-17 DIAGNOSIS — J45.30 MILD PERSISTENT ASTHMA WITHOUT COMPLICATION (HCC): ICD-10-CM

## 2024-06-17 DIAGNOSIS — R06.02 SOB (SHORTNESS OF BREATH): Primary | ICD-10-CM

## 2024-06-17 RX ORDER — ATORVASTATIN CALCIUM 10 MG/1
10 TABLET, FILM COATED ORAL NIGHTLY
Qty: 90 TABLET | Refills: 1 | Status: SHIPPED | OUTPATIENT
Start: 2024-06-17

## 2024-06-17 NOTE — TELEPHONE ENCOUNTER
Patient will need a new order placed for the Stress Test without the treadmill. Please send patient a [employer] Secure[employer] message when placed.

## 2024-07-01 ENCOUNTER — MED REC SCAN ONLY (OUTPATIENT)
Dept: FAMILY MEDICINE CLINIC | Facility: CLINIC | Age: 59
End: 2024-07-01

## 2024-07-02 ENCOUNTER — OFFICE VISIT (OUTPATIENT)
Dept: SURGERY | Facility: CLINIC | Age: 59
End: 2024-07-02
Payer: COMMERCIAL

## 2024-07-02 DIAGNOSIS — N13.8 BPH WITH OBSTRUCTION/LOWER URINARY TRACT SYMPTOMS: Primary | ICD-10-CM

## 2024-07-02 DIAGNOSIS — N40.1 BPH WITH OBSTRUCTION/LOWER URINARY TRACT SYMPTOMS: Primary | ICD-10-CM

## 2024-07-02 PROCEDURE — 99214 OFFICE O/P EST MOD 30 MIN: CPT | Performed by: SURGERY

## 2024-07-02 PROCEDURE — 81003 URINALYSIS AUTO W/O SCOPE: CPT | Performed by: SURGERY

## 2024-07-02 RX ORDER — FLUTICASONE PROPIONATE AND SALMETEROL 500; 50 UG/1; UG/1
1 POWDER RESPIRATORY (INHALATION) 2 TIMES DAILY
COMMUNITY
Start: 2024-06-17

## 2024-07-02 NOTE — PROGRESS NOTES
Urology Clinic Note    Primary Care Provider:  Braxton Rivas MD     Chief Complaint:   BPH/LUTS     HPI:   Devin Urrutia is a 59 year old male with history of asthma, hyperlipidemia referred for lower urinary tract symptoms.     I saw him for initial visit on 12/7/2023.  At that time he endorsed nocturia hourly, and he endorsed drinking water when waking up at night due to dry mouth.  He also endorsed slow urinary stream and urinary hesitancy.  His symptoms are more bothersome at night.  2 cups of tea in the morning only.  NICOLE with 45 g benign prostate.  I started him on tamsulosin 0.4 mg nightly.  He experienced dizziness and fatigue so we will switch to silodosin.     He was unable to get silodosin due to insurance.  He continues to endorse bothersome nocturia every hour.  He does wake up with dry mouth so drinks water throughout the night.  He denies urinary urgency or frequency during the daytime.  He occasionally has weak urinary stream but this is not his main problem.  He is unsure if he snores, and given significant nocturia with no daytime symptoms as well as dry mouth I recommend sleep study.  I will also trial alfuzosin to see if insurance will cover this and if this would not cause dizziness and other side effects.     He started alfuzosin and finds no significant improvement in his urinary symptoms.  He continues to have bothersome nocturia with large volumes at night 5 times per night.  He denies urgency or frequency during the daytime.  He does drink a lot of fluids before and during the night.  He denies weak urinary stream.  Occasional urinary hesitancy.  He is scheduled for sleep study in August.     His most recent PSA on 2/7/2023 was 2.46.    AUA symptom score is 23/3 with LUTS.    Post-void residual bladder scan: 15 mL    Urinalysis: Negative    PSA:  Lab Results   Component Value Date    PSAS 2.46 02/07/2023    PSAS 1.62 09/24/2019        History:     Past Medical History:    Allergic  rhinitis    Asthma (HCC)    High cholesterol    Hyperlipidemia    Visual impairment       Past Surgical History:   Procedure Laterality Date    Colonoscopy         Family History   Problem Relation Age of Onset    Heart Disorder Father     Hypertension Mother     Heart Disorder Mother        Social History     Socioeconomic History    Marital status:    Tobacco Use    Smoking status: Former    Smokeless tobacco: Former    Tobacco comments:     quit 20 years ago   Vaping Use    Vaping status: Never Used   Substance and Sexual Activity    Alcohol use: Never    Drug use: Never   Other Topics Concern    Caffeine Concern Yes     Comment: occassionally    Exercise Yes     Comment: every other day     Social Determinants of Health      [facility], [facility]    Social Connections    [facility], [facility]    Housing Stability       Medications (Active prior to today's visit):  Current Outpatient Medications   Medication Sig Dispense Refill    fluticasone-salmeterol 500-50 MCG/ACT Inhalation Aerosol Powder, Breath Activated Inhale 1 puff into the lungs 2 (two) times daily.      albuterol 108 (90 Base) MCG/ACT Inhalation Aero Soln Inhale 1 puff into the lungs every 6 (six) hours as needed. 1 each 1    alfuzosin ER 10 MG Oral Tablet 24 Hr Take 1 tablet (10 mg total) by mouth daily. 90 tablet 5    ATORVASTATIN 10 MG Oral Tab TAKE 1 TABLET(10 MG) BY MOUTH EVERY NIGHT 90 tablet 1    Meloxicam 15 MG Oral Tab Take 1 tablet (15 mg total) by mouth daily. 30 tablet 2    gabapentin 100 MG Oral Cap Take 3 capsules (300 mg total) by mouth nightly. 100mg at bedtime x 1 week then increase to 200mg at bedtime x 1 week then 300mg at bedtime 90 capsule 2    cetirizine 5 MG Oral Tab Take 1 tablet (5 mg total) by mouth daily.      Fluocinolone Acetonide 0.01 % Otic Oil 2 drops each ear twice daily for 30 days, repeat as necessary  1 each 5    FLOVENT  MCG/ACT Inhalation Aerosol Inhale 2 puffs into the lungs 2 (two) times daily. 1 each 3    montelukast 10 MG Oral Tab Take 1 tablet (10 mg total) by mouth nightly. 90 tablet 1    Olopatadine HCl 0.2 % Ophthalmic Solution Apply 1 drop to eye 3 (three) times daily as needed. 1 each 3    Respiratory Therapy Supplies (NEBULIZER) Does not apply Device Please use every 6 hour as needed for Shortness of breath, wheezing 1 Device 0       Allergies:  No Known Allergies    Review of Systems:   A comprehensive 10-point review of systems was completed.  Pertinent positives and negatives are noted in the the HPI.    Physical Exam:   CONSTITUTIONAL: Well developed, well nourished, in no acute distress  NEUROLOGIC: Alert and oriented  HEAD: Normocephalic, atraumatic  EYES: Sclera non-icteric  ENT: Hearing intact, moist mucous membranes  NECK: No obvious goiter or masses  RESPIRATORY: Normal respiratory effort  SKIN: No evident rashes  ABDOMEN: Soft, non-tender, non-distended    Assessment & Plan:   Devin Urrutia is a 59 year old male with history of asthma, hyperlipidemia referred for lower urinary tract symptoms.     I saw him for initial visit on 12/7/2023.  At that time he endorsed nocturia hourly, and he endorsed drinking water when waking up at night due to dry mouth.  He also endorsed slow urinary stream and urinary hesitancy.  His symptoms are more bothersome at night.  2 cups of tea in the morning only.  NICOLE with 45 g benign prostate.  I started him on tamsulosin 0.4 mg nightly.  He experienced dizziness and fatigue so we will switch to silodosin.     He was unable to get silodosin due to insurance.  He continues to endorse bothersome nocturia every hour.  He does wake up with dry mouth so drinks water throughout the night.  He denies urinary urgency or frequency during the daytime.  He occasionally has weak urinary stream but this is not his main problem.  He is unsure if he snores, and given  significant nocturia with no daytime symptoms as well as dry mouth I recommend sleep study.  I will also trial alfuzosin to see if insurance will cover this and if this would not cause dizziness and other side effects.    He started alfuzosin and finds no significant improvement in his urinary symptoms.  He continues to have bothersome nocturia with large volumes at night 5 times per night.  He denies urgency or frequency during the daytime.  He does drink a lot of fluids before and during the night.  He denies weak urinary stream.  Occasional urinary hesitancy.  He is scheduled for sleep study in August.     His most recent PSA on 2/7/2023 was 2.46.    -Okay to stop alfuzosin  -Discussed the option of cystoscopy to evaluate prostate, but he does not have obstructive LUTS really at this time  -His most bothersome symptoms are large volume nocturia, I again discussed that this is most likely related to fluid intake and/or GINNY which she is going to be evaluated for  -If symptoms do not improve after limiting fluids before bedtime and sleep study can consider cystoscopy    In total, 30 minutes were spent on this patient encounter (including chart review, patient history, physical, and counseling, documentation, and communication).    Julio Birmingham MD  Staff Urologist  [facility]  Office: [phone]

## 2024-07-12 ENCOUNTER — TELEPHONE (OUTPATIENT)
Dept: PHYSICAL THERAPY | Facility: HOSPITAL | Age: 59
End: 2024-07-12

## 2024-07-13 RX ORDER — ATORVASTATIN CALCIUM 10 MG/1
10 TABLET, FILM COATED ORAL NIGHTLY
Qty: 90 TABLET | Refills: 1 | Status: SHIPPED | OUTPATIENT
Start: 2024-07-13

## 2024-07-15 ENCOUNTER — TELEPHONE (OUTPATIENT)
Dept: RHEUMATOLOGY | Facility: CLINIC | Age: 59
End: 2024-07-15

## 2024-07-15 ENCOUNTER — OFFICE VISIT (OUTPATIENT)
Dept: PHYSICAL THERAPY | Age: 59
End: 2024-07-15
Attending: INTERNAL MEDICINE
Payer: COMMERCIAL

## 2024-07-15 DIAGNOSIS — M62.830 LUMBAR PARASPINAL MUSCLE SPASM: Primary | ICD-10-CM

## 2024-07-15 DIAGNOSIS — M79.18 MYOFASCIAL PAIN DYSFUNCTION SYNDROME: ICD-10-CM

## 2024-07-15 PROCEDURE — 97110 THERAPEUTIC EXERCISES: CPT

## 2024-07-15 PROCEDURE — 97161 PT EVAL LOW COMPLEX 20 MIN: CPT

## 2024-07-15 NOTE — PROGRESS NOTES
SPINE EVALUATION:     Diagnosis:    Lumbar paraspinal muscle spasm (M62.830)  Myofascial pain dysfunction syndrome (M79.18)      Referring Provider: Morena Olguin  Date of Evaluation:    7/15/2024    Precautions:  None Next MD visit:   none scheduled  Date of Surgery: n/a     PATIENT SUMMARY   Devin Urrutia is a 59 year old male who presents to therapy today with complaints of central low back pain with insidious onset multiple years ago. He denies radiculopathy. Pt states pain has been exacerbated due to 2 falls in last year. One occurred due to slipping on ice while shoveling driveway, and the second incident was caused by tripping on boxes in dark environment. In addition, he explains he has significant pain in B anterior knees. He had previous L arthroscopic knee surgery (3/24) and cortisone shots in B knees with limited improvement. He has previously done PT for B knees with limited success. He denies previous physical therapy for back pain, but explains the pain as \"intermittent\" and \"burning.\" Pain in both low back and B knees is increased with prolonged standing and walking (>20 mins). He explains symptoms improve with rest/sitting down.     Pt describes pain level current 3/10, at best 2/10, at worst 5/10.   Current functional limitations include sleeping, prolonged standing, walking > 20 minutes, going to [facility].     Devin describes prior level of function as previously walking for >1 hour with no increase in pain and going to [facility] multiple times per week for exercise. Pt goals include \"to walk as easily as I used to be able to.\"    Past medical history was reviewed with Devin. Significant findings include asthma, in which pt states is exacerbated by pet dander and weather. Pt is currently taking Meloxicam and gabapentin for pain.    Pt denies diplopia, dysarthria, dysphasia, dizziness, drop attacks, bowel/bladder changes, saddle anesthesia, MATHEW LE N/T, active cancer, osteoporosis, recent fractures,  diabetes, and heart disease.  Past Medical History:    Allergic rhinitis    Asthma (HCC)    High cholesterol    Hyperlipidemia    Visual impairment       Past Surgical History:   Procedure Laterality Date    Colonoscopy         Recent Imaging:  XR KNEE (1 OR 2 VIEWS), RIGHT (CPT=73560)    Result Date: 6/13/2024  CONCLUSION:  No evidence of acute displaced fracture or dislocation in the right knee.  LOCATION:  [facility]   Dictated by (CST): Ricardo Sarah MD on 6/13/2024 at 8:07 AM     Finalized by (CST): Ricardo Sarah MD on 6/13/2024 at 8:07 AM       XR KNEE (1 OR 2 VIEWS), LEFT (CPT=73560)    Result Date: 6/13/2024  CONCLUSION:  No evidence of acute displaced fracture or dislocation in the left knee.  LOCATION:  [facility]   Dictated by (CST): Ricardo Sarah MD on 6/13/2024 at 8:07 AM     Finalized by (CST): Ricardo Sarah MD on 6/13/2024 at 8:07 AM       XR LUMBAR SPINE (MIN 4 VIEWS) (CPT=72110)    Result Date: 6/13/2024  CONCLUSION:  No acute fracture or subluxation in lumbar spine.  Degenerative changes as above.   LOCATION:  [facility]   Dictated by (CST): Ricardo Sarah MD on 6/13/2024 at 8:06 AM     Finalized by (CST): Ricardo Sarah MD on 6/13/2024 at 8:07 AM       XR PELVIS (COMPLETE MIN 3 VIEWS) (CPT=72190)    Result Date: 6/13/2024  CONCLUSION:  No evidence of acute displaced fracture or dislocation in the pelvis.  LOCATION:  [facility]   Dictated by (CST): Ricardo Sarah MD on 6/13/2024 at 8:05 AM     Finalized by (CST): Ricardo Sarah MD on 6/13/2024 at 8:06 AM           CHRIS Beltran presents to physical therapy evaluation with primary c/o low back pain and subsequent B ant knee pain. The results of the objective tests and measures show limited trunk flexion and B side bending with pain, decreased strength in B hip abductors and extensors, tightness in B hamstrings and piriformis, and impaired SL balance.  Functional deficits include but are not limited to sleeping, prolonged standing, walking > 20  minutes, and going to [facility] for recreational exercise. Signs and symptoms are consistent with diagnosis of lumbar paraspinal muscle spasm. Pt and PT discussed evaluation findings, pathology, POC and HEP.  Pt voiced understanding and performs HEP correctly without reported pain. Skilled Physical Therapy is medically necessary to address the above impairments and reach functional goals.     OBJECTIVE:   Observation/Posture: rounded shoulders, posterior pelvic tilt, forward flexed posture     AROM: (* denotes performed with pain)  Lumbar:  Flexion: mid tibia* (pain in B knees)  Extension: WFL* (central LBP)  Sidebending: R 19* (R p/s) ; L 22* (L p/s)  Rotation: R WFL* (R p/s); L WFL    LE AROM R L   Knee Flex 138 140   Knee Ext 0 0       Accessory motion: NT  Palpation: multiple trigger points identified in B thoracic and lumbar paraspinals with TTP    Strength: (* denotes performed with pain)  LE   Hip flexion (L2): R 4/5; L 4-/5  Hip abduction: R 3+/5*; L 3+/5*  Hip Extension: R 3+/5; L 3+/5   Knee Flexion: R 4+/5; L 4/5   Knee extension (L3): R 4+/5; L 4+/5   DF (L4): R 5/5; L 5/5    TA Strength: poor     Flexibility:   LE   Hamstrings: R lacking 48 degrees ext; L lacking 36 degrees ext  Piriformis: R significnatly limited ; L significantly limited  Quads: R mod limted; L mod limited        Gait: pt ambulates on level ground with decreased arm swing and decreased trunk rotation .  Balance: SLS R 13.04 sec, L 5.7 sec    Today’s Treatment and Response:   Pt education was provided on exam findings, treatment diagnosis, treatment plan, expectations, and prognosis. Pt was also provided recommendations for activity modifications, postural corrections, and shoe wear  Patient was instructed in and issued a HEP for: Access Code: [email or web address]  URL: [email or web address]  Date: 07/15/2024  Prepared by: Sue Huertas    Exercises  - Supine Lower Trunk Rotation  - 1 x daily - 7 x weekly - 3 sets - 10 reps  -  Seated Hamstring Stretch  - 1 x daily - 7 x weekly - 3 sets - 30\" hold  - Seated Piriformis Stretch with Trunk Bend  - 1 x daily - 7 x weekly - 3 sets - 30\" hold    Charges: PT Ra Low Complexity, 29 minutes      Total Timed Treatment: 16 min     Total Treatment Time: 45 min     Therex 16 min (1 unit): HEP reviewed and demonstrated; see above     PLAN OF CARE:    Goals: (to be met in 8 visits)   Pt will improve transversus abdominis recruitment to perform proper isometric contraction without requiring verbal or tactile cuing to promote advancement of therex   Pt will demonstrate good understanding of proper posture and body mechanics to decrease pain and improve spinal safety   Pt will improve B hip abduction strength to >/= 4/5 in order to walk greater distances with no increase in pain  Pt will report improved sleeping ability as noted by sleeping through the night will </= 2 interruptions due to pain  Pt will have decreased paraspinal mm tension to tolerate standing >10 minutes for work and home activities   Pt will demonstrate improved B hip extensor strength to stand at work with <3/10 pain   Pt will be independent and compliant with comprehensive HEP to maintain progress achieved in PT      Frequency / Duration: Patient will be seen for 2 x/week or a total of 8 visits over a 90 day period. Treatment will include: Manual Therapy, Neuromuscular Re-education, Therapeutic Activities, Therapeutic Exercise, and Home Exercise Program instruction    Education or treatment limitation: None  Rehab Potential:good    Oswestry Disability Index Score  Score: 48 % (7/15/2024  4:58 PM)      Patient/Family/Caregiver was advised of these findings, precautions, and treatment options and has agreed to actively participate in planning and for this course of care.    Thank you for your referral. Please co-sign or sign and return this letter via fax as soon as possible to [phone]. If you have any questions, please contact me  at Dept: [phone]    Sincerely,  Electronically signed by therapist: Randi Adam PT    Physician's certification required: Yes  I certify the need for these services furnished under this plan of treatment and while under my care.    X___________________________________________________ Date____________________    Certification From: 7/15/2024  To:10/13/2024

## 2024-07-16 ENCOUNTER — TELEMEDICINE (OUTPATIENT)
Dept: FAMILY MEDICINE CLINIC | Facility: CLINIC | Age: 59
End: 2024-07-16
Payer: COMMERCIAL

## 2024-07-16 DIAGNOSIS — J45.30 MILD PERSISTENT ASTHMA WITHOUT COMPLICATION (HCC): Primary | ICD-10-CM

## 2024-07-16 PROCEDURE — 99213 OFFICE O/P EST LOW 20 MIN: CPT | Performed by: FAMILY MEDICINE

## 2024-07-16 NOTE — PROGRESS NOTES
Subjective:   Patient ID: Devin Urrutia is a 59 year old male.    HPI  Mr. Urrutia is a pleasant 59-year-old gentleman with known history of asthma, hypothyroidism, hyperlipidemia, arthritis presenting today for video visit for persistent chest congestion.  He has been prescribed with antibiotics and prednisone which will provide temporary relief only to get chest congestion at this point.  He does not have fever, cough, chest pain, shortness of breath, nausea, vomiting, abdominal pain.  He is scheduled to undergo stress test in the next few days.  He continues to take his albuterol inhaler as needed and his Flovent.  Chest x-ray was done a few weeks ago which showed mild hyper inflation.  I had reviewed past medical and family histories together with allergy and medication lists documented.    History/Other:   Review of Systems   Constitutional:  Negative for fatigue and fever.   HENT:  Positive for congestion.    Respiratory:  Negative for cough and shortness of breath.    Cardiovascular:  Negative for chest pain, palpitations and leg swelling.   Gastrointestinal:  Negative for abdominal pain, diarrhea, nausea and vomiting.     Current Outpatient Medications   Medication Sig Dispense Refill    albuterol 108 (90 Base) MCG/ACT Inhalation Aero Soln Inhale 1 puff into the lungs every 6 (six) hours as needed. 1 each 1    atorvastatin 10 MG Oral Tab Take 1 tablet (10 mg total) by mouth nightly. 90 tablet 1    fluticasone-salmeterol 500-50 MCG/ACT Inhalation Aerosol Powder, Breath Activated Inhale 1 puff into the lungs 2 (two) times daily.      Meloxicam 15 MG Oral Tab Take 1 tablet (15 mg total) by mouth daily. 30 tablet 2    gabapentin 100 MG Oral Cap Take 3 capsules (300 mg total) by mouth nightly. 100mg at bedtime x 1 week then increase to 200mg at bedtime x 1 week then 300mg at bedtime 90 capsule 2    alfuzosin ER 10 MG Oral Tablet 24 Hr Take 1 tablet (10 mg total) by mouth daily. 90 tablet 5    cetirizine 5 MG Oral  Tab Take 1 tablet (5 mg total) by mouth daily.      Fluocinolone Acetonide 0.01 % Otic Oil 2 drops each ear twice daily for 30 days, repeat as necessary 1 each 5    FLOVENT  MCG/ACT Inhalation Aerosol Inhale 2 puffs into the lungs 2 (two) times daily. 1 each 3    montelukast 10 MG Oral Tab Take 1 tablet (10 mg total) by mouth nightly. 90 tablet 1    Olopatadine HCl 0.2 % Ophthalmic Solution Apply 1 drop to eye 3 (three) times daily as needed. 1 each 3    Respiratory Therapy Supplies (NEBULIZER) Does not apply Device Please use every 6 hour as needed for Shortness of breath, wheezing 1 Device 0     Allergies:No Known Allergies    Objective:   Physical Exam  Constitutional:       General: He is not in acute distress.  Neurological:      Mental Status: He is alert.   Psychiatric:         Mood and Affect: Mood normal.         Behavior: Behavior normal.         Assessment & Plan:   1. Mild persistent asthma without complication (HCC)    -Explained to him that symptoms are likely due to asthma  - Continue with current medication regimen but had encouraged him to follow-up with his pulmonologist to be evaluated further which she agrees.  - Go to the emergency room if would develop severe respiratory distress    This note was prepared using Dragon Medical voice recognition dictation software. As a result errors may occur. When identified these errors have been corrected. While every attempt is made to correct errors during dictation discrepancies may still exist            No orders of the defined types were placed in this encounter.      Meds This Visit:  Requested Prescriptions      No prescriptions requested or ordered in this encounter       Imaging & Referrals:  None

## 2024-07-17 ENCOUNTER — TELEPHONE (OUTPATIENT)
Dept: ORTHOPEDICS CLINIC | Facility: CLINIC | Age: 59
End: 2024-07-17

## 2024-07-17 ENCOUNTER — OFFICE VISIT (OUTPATIENT)
Dept: ORTHOPEDICS CLINIC | Facility: CLINIC | Age: 59
End: 2024-07-17
Payer: COMMERCIAL

## 2024-07-17 VITALS — WEIGHT: 183 LBS | BODY MASS INDEX: 25.62 KG/M2 | HEIGHT: 71 IN

## 2024-07-17 DIAGNOSIS — M94.261 CHONDROMALACIA OF RIGHT KNEE: Primary | ICD-10-CM

## 2024-07-17 DIAGNOSIS — M65.9 SYNOVITIS OF KNEE: ICD-10-CM

## 2024-07-17 PROCEDURE — 99214 OFFICE O/P EST MOD 30 MIN: CPT | Performed by: ORTHOPAEDIC SURGERY

## 2024-07-17 NOTE — H&P (VIEW-ONLY)
Orthopaedic Surgery  [address] [place] [address]  [phone]     PRE SURGICAL - HISTORY AND PHYSICAL EXAMINATION     Name: Devin Urrutia   MRN: [id number]  Date: 7/17/2024     CC: Right Knee Pain in the setting of chondromalacia    REFERRED BY: Braxton Rivas MD    HPI:   Devin Urrutia is a very pleasant 59 year old male who presents today for evaluation of Right knee pain and recent completion of MRI.     To summarize: right knee pain ongoing for 3-4 years, due to a fall on his knees. He is 4 months s/p Left knee scope, chondroplasty of patella, trochlea, and medial femoral condyle - extensive debridement on 03/12/2024. This is currently progressing very well.    Today, patient wishes to proceed with same surgical procedure as the left knee, knee arthroscopy.     PMH:   Past Medical History:    Allergic rhinitis    Asthma (HCC)    High cholesterol    Hyperlipidemia    Visual impairment       PAST SURGICAL HX:  Past Surgical History:   Procedure Laterality Date    Colonoscopy         FAMILY HX:  Family History   Problem Relation Age of Onset    Heart Disorder Father     Hypertension Mother     Heart Disorder Mother        ALLERGIES:  Patient has no known allergies.    MEDICATIONS:   Current Outpatient Medications   Medication Sig Dispense Refill    atorvastatin 10 MG Oral Tab Take 1 tablet (10 mg total) by mouth nightly. 90 tablet 1    fluticasone-salmeterol 500-50 MCG/ACT Inhalation Aerosol Powder, Breath Activated Inhale 1 puff into the lungs 2 (two) times daily.      albuterol 108 (90 Base) MCG/ACT Inhalation Aero Soln Inhale 1 puff into the lungs every 6 (six) hours as needed. 1 each 1    Meloxicam 15 MG Oral Tab Take 1 tablet (15 mg total) by mouth daily. 30 tablet 2    gabapentin 100 MG Oral Cap Take 3 capsules (300 mg total) by mouth nightly. 100mg at bedtime x 1 week then increase to 200mg at bedtime x 1 week then 300mg at bedtime 90 capsule 2    alfuzosin ER 10 MG Oral Tablet 24 Hr Take  1 tablet (10 mg total) by mouth daily. 90 tablet 5    cetirizine 5 MG Oral Tab Take 1 tablet (5 mg total) by mouth daily.      Fluocinolone Acetonide 0.01 % Otic Oil 2 drops each ear twice daily for 30 days, repeat as necessary 1 each 5    FLOVENT  MCG/ACT Inhalation Aerosol Inhale 2 puffs into the lungs 2 (two) times daily. 1 each 3    montelukast 10 MG Oral Tab Take 1 tablet (10 mg total) by mouth nightly. 90 tablet 1    Olopatadine HCl 0.2 % Ophthalmic Solution Apply 1 drop to eye 3 (three) times daily as needed. 1 each 3    Respiratory Therapy Supplies (NEBULIZER) Does not apply Device Please use every 6 hour as needed for Shortness of breath, wheezing 1 Device 0       ROS: A comprehensive 14 point review of systems was performed and was negative aside from the aforementioned per history of present illness.    SOCIAL HX:  Social History     Tobacco Use    Smoking status: Former    Smokeless tobacco: Former    Tobacco comments:     quit 20 years ago   Substance Use Topics    Alcohol use: Never       PE:   Vitals:    07/17/24 1001   Weight: 183 lb   Height: 5' 11\" (1.803 m)     Estimated body mass index is 25.52 kg/m² as calculated from the following:    Height as of this encounter: 5' 11\" (1.803 m).    Weight as of this encounter: 183 lb.    Physical Exam  Constitutional:       Appearance: Normal appearance.   HENT:      Head: Normocephalic and atraumatic.   Eyes:      Extraocular Movements: Extraocular movements intact.   Neck:      Musculoskeletal: Normal range of motion and neck supple.   Cardiovascular:      Pulses: Normal pulses.   Pulmonary:      Effort: Pulmonary effort is normal. No respiratory distress.   Abdominal:      General: There is no distension.   Skin:     General: Skin is warm.      Capillary Refill: Capillary refill takes less than 2 seconds.      Findings: No bruising.   Neurological:      General: No focal deficit present.      Mental Status: Alert.   Psychiatric:         Mood and  Affect: Mood normal.     Examination of the right knee demonstrates:     Skin is intact, warm and dry.   Atrophy: none    Effusion: small    Joint line tenderness: Primarily in patellofemoral joint  Crepitation: none   Alexandrea: Positive   Patellar mobility: normal without apprehension  J-sign: none    ROM: Extension lacking less than 5 degrees  Flexion 120 degrees  ACL:  Negative Lachman, Negative Pivot Shift   PCL:  Negative Posterior Drawer  Collateral Ligaments: Stable to Varus and Valgus stress at 0 and 30 degrees  Strength: mild weakness   Hip joint: normal pain-free ROM   Gait:  mildly antalgic   Leg length: equal and symmetric  Alignment:  neutral     No obvious peripheral edema noted.   Distal neurovascular exam demonstrates normal perfusion, intact sensation to light touch and full strength.     Examination of the contralateral knee demonstrates:  No significant atrophy, swelling or effusion. Full range of motion. Neurovascularly intact distally.    Radiographic Examination/Diagnostics:  MRI of the knee personally viewed, independently interpreted and radiology report was reviewed.    MRI KNEE, RIGHT (STN=[id number])    Result Date: 10/24/2022  PROCEDURE:  MRI KNEE, RIGHT (VSX=67710) LOCATION:  [facility]   COMPARISON:  [facility], XR, XR KNEE (1 OR 2 VIEWS), RIGHT (CPT=73560), 6/25/2022, 9:27 AM. INDICATIONS:  Status post fall 5 months ago with persisting knee pain. TECHNIQUE:  Axial, coronal, and sagittal proton density with and without fat saturation images were obtained. PATIENT STATED HISTORY: (As transcribed by Technologist)  The patient reports a fall five months ago. Now with bilateral medial knee pain. FINDINGS:   LIGAMENTS:          The ACL is intact and unremarkable.  The PCL reveals thickening and intermediate grade signal involving the proximal to mid fibers consistent with a moderate grade PCL sprain.  The collateral ligaments are intact with thickening of the proximal, superficial MCL without  abnormal signal, consistent with low-grade chronic sprain.  Patellofemoral ligaments are unremarkable. MENISCI:            The medial and lateral menisci are intact without evidence of tear or significant degenerative change. TENDONS:            There is mild distal quadriceps tendinosis and enthesopathy.  No high-grade tendinopathy noted. MUSCULATURE:        No evidence of strain, edema, or atrophy. BONY COMPARTMENTS:  There is evidence of mild tricompartmental osteoarthritis with multiple small foci of low to moderate grade articular cartilage loss within all 3 compartments of the knee. SYNOVIUM:           Mild joint effusion.     CONCLUSION:     1. There is a moderate grade PCL sprain involving the proximal to mid fibers.   2. Mild tricompartmental osteoarthritis and mild joint effusion.   3. Low-grade chronic sprain of the proximal, superficial aspect of the MCL.   Dictated by (CST): Gianna Kennedy DO on 10/24/2022 at 11:57 AM    Finalized by (CST): Gianna Kennedy DO on 10/24/2022 at 12:01 PM        IMPRESSION: Devin Urrutia is a 59 year old male with right knee chondromalacia symptomatic for 3-4 years, due to a fall on his knees.  They have failed extensive conservative treatment including Physical therapy greater than 6 weeks, intra-articular knee corticosteroid injection, oral anti-inflammatory medications, and activity modification. He has additionally tried gel injections.     Consequently, they are an appropriate candidate for knee arthroscopy, abrasionplasty and extensive debridement/synovectomy.    PLAN:   I had a lengthy discussion with Devin about the diagnosis and options, both surgical and nonsurgical. I have recommended that we proceed with surgical treatment as we agree surgical intervention would likely offer the best opportunity for symptomatic relief and functional recovery. I used diagrams, radiographic studies, and a 3-dimensional model to outline his pathology, as well as general surgical  principles. We reviewed the risks associated with the proposed procedure.  In particular we discussed risks that include, but are not limited to infection, blood loss, potential transient or permanent injury to nerves or blood vessels, joint stiffness, persistent pain, need for future operation, failure of healing, wound complications, deep vein thrombosis and pulmonary embolism. We discussed the proposed rehabilitation timeline as well as expected postoperative restrictions.   Devin voiced a good understanding of treatment options, risks and benefits, postoperative instructions, rehabilitation timeline, and restrictions. He was given the opportunity to ask questions, which were all answered to the best of my ability and to his satisfaction. Devin will work with my office to arrange a time for surgery and obtain any medical clearance information necessary. My pre-operative information packet, which details the process and answers many FAQ's will be provided. He was encouraged to call the office with any further questions or concerns.   I spent 30 minutes in preparation to see the patient, counseling/education of relevant pathology, discussing imaging results, ordering post surgical physical therapy intervention, surgical counseling, and care coordination.        FOLLOW-UP:  Post-Operative Visit, POD 6 with Cherry Belle PA-C.         Jennifer Sandoval MD  Knee, Shoulder, & Elbow Surgery / Sports Medicine Specialist  Orthopaedic Surgery  [address] [place] [address].org  [email or web address]  t: [phone]  o: [phone]  f: [phone]    This note was dictated using Dragon software.  While it was briefly proofread prior to completion, some grammatical, spelling, and word choice errors due to dictation may still occur.

## 2024-07-17 NOTE — TELEPHONE ENCOUNTER
CALLED PT TO GO OVER SURGICAL PROTOCOL AND POST OP APPT. PT WAS DRIVING HE WILL GIVE US A CALL BACK WHEN HE CAN LATER TODAY.

## 2024-07-17 NOTE — PROGRESS NOTES
OR BOOKING SHEET KNEE ARTHROSCOPY  Location: [x] [facility]   [] [facility]  Name: Devin Urrutia  MRN: [id number]   : 1965  Diagnosis:  [x] Chondromalacia of right knee [M94.261]  Disposition:    [x] Ambulatory  Operative Time Required: 60 minutes ([facility])  Procedure:  Antibiotics: 2 g cefazolin within 60 minutes of surgical incision (3 g if > 120 kg)  Laterality: [x] RIGHT  [] LEFT                       [] BILATERAL  Procedures:   [x] Knee Arthroscopy     [] Partial Medial Meniscectomy (56232)   [] Partial Lateral Meniscectomy (64964)                    [] Partial Medial and Lateral Meniscectomy (72405)     [] Lateral Meniscus Repair (10743)                    [] Medial Meniscus Repair (46495)     [x] Synovectomy (09470)   [x] Abrasionplasty (50515)     [] Partial Medial Meniscectomy vs Medial Meniscus Repair (13054 vs 57614)   [] Partial Lateral Meniscectomy vs Lateral Meniscus Repair (32176 vs 46605)   [] Irrigation & Debridement (20416)   [] Manipulation Under Anesthesia (34352)   [] Chondroplasty (68038)   [] Removal of Loose Body (47948)    Injections:   [x] Stem cells from bone marrow aspiration (60021)    From:  [] Left Iliac crest  [x] Right Iliac crest    To:  [] Left knee  [x] Right knee  [] Other     Additional info:   [] PCP Clearance Needed  [] MRSA  [x] Physical Therapy Internal   [] DME Rx  [] Appt with Dr. Sandoval needed  Implants needed: Argelia Biocue  Positioning Equipment: Supine with Lateral Post

## 2024-07-17 NOTE — TELEPHONE ENCOUNTER
Spoke with patient and we scheduled surgery, post op and went over pre operative procedures. All questions answered.

## 2024-07-17 NOTE — H&P
[facility] ORTHOPEDICS  [address] [place] [address]  [phone]       Name: Devin Urrutia   MRN: [id number]  Date: 7/17/2024     REASON FOR VISIT: Second Post-Surgical Visit   Surgery: Left knee scope, chondroplasty of patella, trochlea, and medial femoral condyle - extensive debridement on 03/12/2024.     INTERVAL HISTORY:  Devin Urrutia is a 59 year old male who returns after the aforementioned procedure.  The post-operative course has been unremarkable with pain well controlled and overall progress noted.     Physical therapy was started and is progressing well.  The patient denies any calf pain or tenderness, fevers, chills, sweats or signs of active infection. The patient has been compliant with the postoperative protocol, and admits to normal bowel and bladder function. No acute issues.     ROS: ROS    PE:   Vitals:    07/17/24 1001   Weight: 183 lb   Height: 5' 11\" (1.803 m)     Estimated body mass index is 25.52 kg/m² as calculated from the following:    Height as of this encounter: 5' 11\" (1.803 m).    Weight as of this encounter: 183 lb.    Physical Exam  Constitutional:       Appearance: Normal appearance.   HENT:      Head: Normocephalic and atraumatic.   Eyes:      Extraocular Movements: Extraocular movements intact.   Neck:      Musculoskeletal: Normal range of motion and neck supple.   Cardiovascular:      Pulses: Normal pulses.   Pulmonary:      Effort: Pulmonary effort is normal. No respiratory distress.   Abdominal:      General: There is no distension.   Skin:     General: Skin is warm.      Capillary Refill: Capillary refill takes less than 2 seconds.      Findings: No bruising.   Neurological:      General: No focal deficit present.      Mental Status: She is alert.   Psychiatric:         Mood and Affect: Mood normal.     Examination of the left knee demonstrates:     Physical examination the patient is alert and oriented x3, well-developed, well-nourished,  no acute distress.     Tegaderm dressings were removed, and Steri-Strips were maintained and kept in place.    Incisional sites are clean dry intact without signs of active pathology.  Calf is soft and nontender to palpation.    The contralateral knee is without limitation in range of motion or strength, no positive provocative maneuvers.         IMPRESSION: Devin Urrutia is a 59 year old male who presents 4 months s/p Left knee scope, chondroplasty of patella, trochlea, and medial femoral condyle - extensive debridement on 03/12/2024.     PLAN:   We had a lengthy discussion with the patient regarding the patient's findings consistent with the expected postoperative course. We recommend continuation of physical therapy with rehabilitation efforts focused on strengthening, range of motion, functional ability, and return to baseline activity. The patient can continue to progress per protocol.    All questions were answered appropriately and the patient was in agreement with the treatment plan.       FOLLOW-UP:  Return to clinic in four weeks. No imaging required at next visit.       Jennifer Sandoval MD  Knee, Shoulder, & Elbow Surgery / Sports Medicine Specialist  Orthopaedic Surgery  [address] [place] [address].org  [email or web address]  t: [phone]  o: [phone]  f: [phone]     This note was dictated using Dragon software.  While it was briefly proofread prior to completion, some grammatical, spelling, and word choice errors due to dictation may still occur.

## 2024-07-17 NOTE — TELEPHONE ENCOUNTER
Date of Surgery: 2024     Post Op Appt: 24 940AM    Case ID: [id number]     Notes: Lets add for , thanks!        OR BOOKING SHEET KNEE ARTHROSCOPY  Location: [x] [facility]                    [] [facility]  Name: Devin Urrutia  MRN: [id number]   : 1965  Diagnosis:  [x] Chondromalacia of right knee [M94.261]  Disposition:    [x] Ambulatory  Operative Time Required: 60 minutes ([facility])  Procedure:  Antibiotics: 2 g cefazolin within 60 minutes of surgical incision (3 g if > 120 kg)  Laterality:                  [x] RIGHT                  [] LEFT                          [] BILATERAL  Procedures:                    [x] Knee Arthroscopy                                                   [] Partial Medial Meniscectomy (49689)                    [] Partial Lateral Meniscectomy (87880)                    [] Partial Medial and Lateral Meniscectomy (33095)                       [] Lateral Meniscus Repair (52497)                    [] Medial Meniscus Repair (63346)                       [x] Synovectomy (15171)                    [x] Abrasionplasty (45025)                       [] Partial Medial Meniscectomy vs Medial Meniscus Repair (71662 vs 80828)                    [] Partial Lateral Meniscectomy vs Lateral Meniscus Repair (87807 vs 74304)                    [] Irrigation & Debridement (81641)                    [] Manipulation Under Anesthesia (66561)                    [] Chondroplasty (58124)                    [] Removal of Loose Body (30240)     Injections:                    [x] Stem cells from bone marrow aspiration (55696)                                      From:                   [] Left Iliac crest                   [x] Right Iliac crest                                      To:                   [] Left knee         [x] Right knee                          [] Other      Additional info:   [] PCP Clearance Needed  [] MRSA  [x] Physical Therapy Internal   [] DME Rx  [] Appt with Dr. Sandoval needed  Implants  needed: Argelia Biocue  Positioning Equipment: Supine with Lateral Post

## 2024-07-17 NOTE — H&P
Orthopaedic Surgery  [address] [place] [address]  [phone]     PRE SURGICAL - HISTORY AND PHYSICAL EXAMINATION     Name: Devin Urrutia   MRN: [id number]  Date: 7/17/2024     CC: Right Knee Pain in the setting of chondromalacia    REFERRED BY: Braxton Rivas MD    HPI:   Devin Urrutia is a very pleasant 59 year old male who presents today for evaluation of Right knee pain and recent completion of MRI.     To summarize: right knee pain ongoing for 3-4 years, due to a fall on his knees. He is 4 months s/p Left knee scope, chondroplasty of patella, trochlea, and medial femoral condyle - extensive debridement on 03/12/2024. This is currently progressing very well.    Today, patient wishes to proceed with same surgical procedure as the left knee, knee arthroscopy.     PMH:   Past Medical History:    Allergic rhinitis    Asthma (HCC)    High cholesterol    Hyperlipidemia    Visual impairment       PAST SURGICAL HX:  Past Surgical History:   Procedure Laterality Date    Colonoscopy         FAMILY HX:  Family History   Problem Relation Age of Onset    Heart Disorder Father     Hypertension Mother     Heart Disorder Mother        ALLERGIES:  Patient has no known allergies.    MEDICATIONS:   Current Outpatient Medications   Medication Sig Dispense Refill    atorvastatin 10 MG Oral Tab Take 1 tablet (10 mg total) by mouth nightly. 90 tablet 1    fluticasone-salmeterol 500-50 MCG/ACT Inhalation Aerosol Powder, Breath Activated Inhale 1 puff into the lungs 2 (two) times daily.      albuterol 108 (90 Base) MCG/ACT Inhalation Aero Soln Inhale 1 puff into the lungs every 6 (six) hours as needed. 1 each 1    Meloxicam 15 MG Oral Tab Take 1 tablet (15 mg total) by mouth daily. 30 tablet 2    gabapentin 100 MG Oral Cap Take 3 capsules (300 mg total) by mouth nightly. 100mg at bedtime x 1 week then increase to 200mg at bedtime x 1 week then 300mg at bedtime 90 capsule 2    alfuzosin ER 10 MG Oral Tablet 24 Hr Take  1 tablet (10 mg total) by mouth daily. 90 tablet 5    cetirizine 5 MG Oral Tab Take 1 tablet (5 mg total) by mouth daily.      Fluocinolone Acetonide 0.01 % Otic Oil 2 drops each ear twice daily for 30 days, repeat as necessary 1 each 5    FLOVENT  MCG/ACT Inhalation Aerosol Inhale 2 puffs into the lungs 2 (two) times daily. 1 each 3    montelukast 10 MG Oral Tab Take 1 tablet (10 mg total) by mouth nightly. 90 tablet 1    Olopatadine HCl 0.2 % Ophthalmic Solution Apply 1 drop to eye 3 (three) times daily as needed. 1 each 3    Respiratory Therapy Supplies (NEBULIZER) Does not apply Device Please use every 6 hour as needed for Shortness of breath, wheezing 1 Device 0       ROS: A comprehensive 14 point review of systems was performed and was negative aside from the aforementioned per history of present illness.    SOCIAL HX:  Social History     Tobacco Use    Smoking status: Former    Smokeless tobacco: Former    Tobacco comments:     quit 20 years ago   Substance Use Topics    Alcohol use: Never       PE:   Vitals:    07/17/24 1001   Weight: 183 lb   Height: 5' 11\" (1.803 m)     Estimated body mass index is 25.52 kg/m² as calculated from the following:    Height as of this encounter: 5' 11\" (1.803 m).    Weight as of this encounter: 183 lb.    Physical Exam  Constitutional:       Appearance: Normal appearance.   HENT:      Head: Normocephalic and atraumatic.   Eyes:      Extraocular Movements: Extraocular movements intact.   Neck:      Musculoskeletal: Normal range of motion and neck supple.   Cardiovascular:      Pulses: Normal pulses.   Pulmonary:      Effort: Pulmonary effort is normal. No respiratory distress.   Abdominal:      General: There is no distension.   Skin:     General: Skin is warm.      Capillary Refill: Capillary refill takes less than 2 seconds.      Findings: No bruising.   Neurological:      General: No focal deficit present.      Mental Status: Alert.   Psychiatric:         Mood and  Affect: Mood normal.     Examination of the right knee demonstrates:     Skin is intact, warm and dry.   Atrophy: none    Effusion: small    Joint line tenderness: Primarily in patellofemoral joint  Crepitation: none   Alexandrea: Positive   Patellar mobility: normal without apprehension  J-sign: none    ROM: Extension lacking less than 5 degrees  Flexion 120 degrees  ACL:  Negative Lachman, Negative Pivot Shift   PCL:  Negative Posterior Drawer  Collateral Ligaments: Stable to Varus and Valgus stress at 0 and 30 degrees  Strength: mild weakness   Hip joint: normal pain-free ROM   Gait:  mildly antalgic   Leg length: equal and symmetric  Alignment:  neutral     No obvious peripheral edema noted.   Distal neurovascular exam demonstrates normal perfusion, intact sensation to light touch and full strength.     Examination of the contralateral knee demonstrates:  No significant atrophy, swelling or effusion. Full range of motion. Neurovascularly intact distally.    Radiographic Examination/Diagnostics:  MRI of the knee personally viewed, independently interpreted and radiology report was reviewed.    MRI KNEE, RIGHT (HLK=[id number])    Result Date: 10/24/2022  PROCEDURE:  MRI KNEE, RIGHT (ZKO=[id number]) LOCATION:  [facility]   COMPARISON:  [facility], XR, XR KNEE (1 OR 2 VIEWS), RIGHT (CPT=[id number]), 6/25/2022, 9:27 AM. INDICATIONS:  Status post fall 5 months ago with persisting knee pain. TECHNIQUE:  Axial, coronal, and sagittal proton density with and without fat saturation images were obtained. PATIENT STATED HISTORY: (As transcribed by Technologist)  The patient reports a fall five months ago. Now with bilateral medial knee pain. FINDINGS:   LIGAMENTS:          The ACL is intact and unremarkable.  The PCL reveals thickening and intermediate grade signal involving the proximal to mid fibers consistent with a moderate grade PCL sprain.  The collateral ligaments are intact with thickening of the proximal, superficial MCL without  abnormal signal, consistent with low-grade chronic sprain.  Patellofemoral ligaments are unremarkable. MENISCI:            The medial and lateral menisci are intact without evidence of tear or significant degenerative change. TENDONS:            There is mild distal quadriceps tendinosis and enthesopathy.  No high-grade tendinopathy noted. MUSCULATURE:        No evidence of strain, edema, or atrophy. BONY COMPARTMENTS:  There is evidence of mild tricompartmental osteoarthritis with multiple small foci of low to moderate grade articular cartilage loss within all 3 compartments of the knee. SYNOVIUM:           Mild joint effusion.     CONCLUSION:     1. There is a moderate grade PCL sprain involving the proximal to mid fibers.   2. Mild tricompartmental osteoarthritis and mild joint effusion.   3. Low-grade chronic sprain of the proximal, superficial aspect of the MCL.   Dictated by (CST): Gianna Kennedy DO on 10/24/2022 at 11:57 AM    Finalized by (CST): Gianna Kennedy DO on 10/24/2022 at 12:01 PM        IMPRESSION: Devin Urrutia is a 59 year old male with right knee chondromalacia symptomatic for 3-4 years, due to a fall on his knees.  They have failed extensive conservative treatment including Physical therapy greater than 6 weeks, intra-articular knee corticosteroid injection, oral anti-inflammatory medications, and activity modification. He has additionally tried gel injections.     Consequently, they are an appropriate candidate for knee arthroscopy, abrasionplasty and extensive debridement/synovectomy.    PLAN:   I had a lengthy discussion with Devin about the diagnosis and options, both surgical and nonsurgical. I have recommended that we proceed with surgical treatment as we agree surgical intervention would likely offer the best opportunity for symptomatic relief and functional recovery. I used diagrams, radiographic studies, and a 3-dimensional model to outline his pathology, as well as general surgical  principles. We reviewed the risks associated with the proposed procedure.  In particular we discussed risks that include, but are not limited to infection, blood loss, potential transient or permanent injury to nerves or blood vessels, joint stiffness, persistent pain, need for future operation, failure of healing, wound complications, deep vein thrombosis and pulmonary embolism. We discussed the proposed rehabilitation timeline as well as expected postoperative restrictions.   Devin voiced a good understanding of treatment options, risks and benefits, postoperative instructions, rehabilitation timeline, and restrictions. He was given the opportunity to ask questions, which were all answered to the best of my ability and to his satisfaction. Devin will work with my office to arrange a time for surgery and obtain any medical clearance information necessary. My pre-operative information packet, which details the process and answers many FAQ's will be provided. He was encouraged to call the office with any further questions or concerns.   I spent 30 minutes in preparation to see the patient, counseling/education of relevant pathology, discussing imaging results, ordering post surgical physical therapy intervention, surgical counseling, and care coordination.        FOLLOW-UP:  Post-Operative Visit, POD 6 with Cherry Belle PA-C.         Jennifer Sandoval MD  Knee, Shoulder, & Elbow Surgery / Sports Medicine Specialist  Orthopaedic Surgery  [address] [place] [address].org  [email or web address]  t: [phone]  o: [phone]  f: [phone]    This note was dictated using Dragon software.  While it was briefly proofread prior to completion, some grammatical, spelling, and word choice errors due to dictation may still occur.

## 2024-07-17 NOTE — PROGRESS NOTES
Diagnosis:   Lumbar paraspinal muscle spasm (M62.830)  Myofascial pain dysfunction syndrome (M79.18)        Referring Provider: Morena Olguin  Date of Evaluation:    7/15/24    Precautions:  None Next MD visit:   none scheduled  Date of Surgery: n/a   Insurance Primary/Secondary: uTest / N/A     # Auth Visits: 5            Subjective: Pt reports intermittent compliance with HEP with no increases of pain. He states no significant changes since eval.    Pain: 3/10      Objective:   SLS: R: >30 sec (13.04 IE)  L: 7 sec (5.7 IE)    Assessment:   Pt returns to clinic for first treatment session following initial evaluation. Session focused on B LE strengthening and core stabilization. Pt requiring verbal and tactile cues for core strengthening activities to maintain form and continue with deep breaths. Pt able to utilize RTB for BLE strengthening. HEP to be progressed next visit. Pt should continue skilled physical therapy in order to address persisting functional limitations including inability to walk >30 mins w/o increases of pain.      Goals:   Pt will improve transversus abdominis recruitment to perform proper isometric contraction without requiring verbal or tactile cuing to promote advancement of therex   Pt will demonstrate good understanding of proper posture and body mechanics to decrease pain and improve spinal safety   Pt will improve B hip abduction strength to >/= 4/5 in order to walk greater distances with no increase in pain  Pt will report improved sleeping ability as noted by sleeping through the night will </= 2 interruptions due to pain  Pt will have decreased paraspinal mm tension to tolerate standing >10 minutes for work and home activities   Pt will demonstrate improved B hip extensor strength to stand at work with <3/10 pain   Pt will be independent and compliant with comprehensive HEP to maintain progress achieved in PT      Plan:   Date: 7/17/2024  TX#: 2/5 Date:                 TX#: 3/  Date:                 TX#: 4/ Date:                 TX#: 5/ Date:   Tx#: 6/   Manual Therapy - 10 min       STM and TrP release B QL and thoracic /lumbar paraspinals               Therex - 31 min       Standing Abduction  10 x 2       Standing Hip Ext   RTB  10 x 2       Clamshells  RTB  10 x 2       Bridges  10 x 2       Seated Hamstring Stretch   B 30'' x 3       Shuttle Press   50#  B x 15       TA bracing 90/90   15'' x 3        HEP: Access Code: [email or web address]  URL: [email or web address]  Date: 07/15/2024  Prepared by: Randi Adam     Exercises  - Supine Lower Trunk Rotation  - 1 x daily - 7 x weekly - 3 sets - 10 reps  - Seated Hamstring Stretch  - 1 x daily - 7 x weekly - 3 sets - 30\" hold  - Seated Piriformis Stretch with Trunk Bend  - 1 x daily - 7 x weekly - 3 sets - 30\" hold    Charges: 2 TE; 1 man       Total Timed Treatment: 41 min  Total Treatment Time: 41 min

## 2024-07-18 ENCOUNTER — OFFICE VISIT (OUTPATIENT)
Dept: PHYSICAL THERAPY | Age: 59
End: 2024-07-18
Attending: INTERNAL MEDICINE
Payer: COMMERCIAL

## 2024-07-18 PROCEDURE — 97110 THERAPEUTIC EXERCISES: CPT

## 2024-07-18 PROCEDURE — 97140 MANUAL THERAPY 1/> REGIONS: CPT

## 2024-07-23 ENCOUNTER — HOSPITAL ENCOUNTER (OUTPATIENT)
Dept: CV DIAGNOSTICS | Facility: HOSPITAL | Age: 59
Discharge: HOME | End: 2024-07-23
Attending: FAMILY MEDICINE
Payer: COMMERCIAL

## 2024-07-23 DIAGNOSIS — R06.02 SOB (SHORTNESS OF BREATH): ICD-10-CM

## 2024-07-23 DIAGNOSIS — J45.30 MILD PERSISTENT ASTHMA WITHOUT COMPLICATION (HCC): ICD-10-CM

## 2024-07-23 PROCEDURE — 78452 HT MUSCLE IMAGE SPECT MULT: CPT | Performed by: FAMILY MEDICINE

## 2024-07-23 PROCEDURE — 93018 CV STRESS TEST I&R ONLY: CPT | Performed by: FAMILY MEDICINE

## 2024-07-23 PROCEDURE — 93017 CV STRESS TEST TRACING ONLY: CPT | Performed by: FAMILY MEDICINE

## 2024-07-23 RX ORDER — REGADENOSON 0.08 MG/ML
INJECTION, SOLUTION INTRAVENOUS
Status: COMPLETED
Start: 2024-07-23 | End: 2024-07-23

## 2024-07-23 RX ADMIN — REGADENOSON 0.4 MG: 0.08 INJECTION, SOLUTION INTRAVENOUS at 10:45:00

## 2024-07-23 NOTE — TELEPHONE ENCOUNTER
Phoned pt, pt making sure PT has been authorized with his HP. Explained to pt that we do not handle the authorization for Pt, advised pt to contact PT to confirm. Per referral note looks to be authorized.

## 2024-07-24 ENCOUNTER — TELEPHONE (OUTPATIENT)
Dept: PHYSICAL THERAPY | Facility: HOSPITAL | Age: 59
End: 2024-07-24

## 2024-07-24 ENCOUNTER — OFFICE VISIT (OUTPATIENT)
Dept: PHYSICAL THERAPY | Age: 59
End: 2024-07-24
Attending: INTERNAL MEDICINE
Payer: COMMERCIAL

## 2024-07-24 PROCEDURE — 97140 MANUAL THERAPY 1/> REGIONS: CPT

## 2024-07-24 PROCEDURE — 97110 THERAPEUTIC EXERCISES: CPT

## 2024-07-24 NOTE — PROGRESS NOTES
Diagnosis:   Lumbar paraspinal muscle spasm (M62.830)  Myofascial pain dysfunction syndrome (M79.18)        Referring Provider: Morena Olguin  Date of Evaluation:    7/15/24    Precautions:  None Next MD visit:   none scheduled  Date of Surgery: n/a   Insurance Primary/Secondary: ThumbAd / N/A     # Auth Visits: 5            Subjective: \" My lower back feels fine but my both knees hurt me constantly and I have difficulty walking due to knees tightness \".    Pain: 3/10      Objective:   SLS: R: >30 sec (13.04 IE)  L: 7 sec (5.7 IE)    Assessment:   Continued with core stabilization ex's and incorporated TrA activation with bridging and hip strength ex's performed in supine , side lying and standing position to further challenge distal trunk and lower extremities with sustained seated and standing  and  walking activities and provided verbal and visual cueing to ensure proper form with some improvement in TrA recruitment following . Patient demo significant mm fatigue with completion of hip strength ex's however was able to perform all given ex's without report of pain in lower back .      Goals:   Pt will improve transversus abdominis recruitment to perform proper isometric contraction without requiring verbal or tactile cuing to promote advancement of therex   Pt will demonstrate good understanding of proper posture and body mechanics to decrease pain and improve spinal safety   Pt will improve B hip abduction strength to >/= 4/5 in order to walk greater distances with no increase in pain  Pt will report improved sleeping ability as noted by sleeping through the night will </= 2 interruptions due to pain  Pt will have decreased paraspinal mm tension to tolerate standing >10 minutes for work and home activities   Pt will demonstrate improved B hip extensor strength to stand at work with <3/10 pain   Pt will be independent and compliant with comprehensive HEP to maintain progress achieved in PT      Plan:    Date: 7/17/2024  TX#: 2/5 Date:   7/24/24              TX#: 3/5 Date:                 TX#: 4/ Date:                 TX#: 5/ Date:   Tx#: 6/   Manual Therapy - 10 min Manual Therapy : 10 min       STM and TrP release B QL and thoracic /lumbar paraspinals  STM and TrP release   B QL an thoracic / lumbar paraspinals              Therex - 31 min Therex : 35 min       Standing Abduction  10 x 2 Standing hip abd with YTB and with TrA activation 2 x 10       Standing Hip Ext   RTB  10 x 2 Standing hip ext with TrA activation with RTB 2 x 10       Clamshells  RTB  10 x 2 Clam shell with TrA activation with RTB   10 x 2       Bridges  10 x 2 Bridging with TrA activation with adductors activation with yellow ball 2 x 10   Bridging with TrA and abductors activation with fitness ring  2 x 10   Supine :  R/L piriformis stretch ( modified figure 4 ) 5 reps each side x 10 sec hold       Seated Hamstring Stretch   B 30'' x 3       Shuttle Press   50#  B x 15 Shuttle :   B leg press with 50 lbs   2 x 10   R/L leg press with 25 lbs 2 x 10       TA bracing 90/90   15'' x 3  Supine :  TA bracing 9/90   10 reps x 10 sec hold   TrA activation with alt marches 2 x 10   TrA activation with marches out / in   X 10       HEP: Access Code: [email or web address]  URL: [email or web address]  Date: 07/15/2024  Prepared by: Randi Adam     Exercises  - Supine Lower Trunk Rotation  - 1 x daily - 7 x weekly - 3 sets - 10 reps  - Seated Hamstring Stretch  - 1 x daily - 7 x weekly - 3 sets - 30\" hold  - Seated Piriformis Stretch with Trunk Bend  - 1 x daily - 7 x weekly - 3 sets - 30\" hold  ( 7/24/24 ) supine : bridging with TrA and adductors activation 2 x 10 , alt marches with TrA activation 2 x 10 , 90/90 heel touches with TrA activation 2 x 10 to be performed 1-2 times a day . ( Handout provided )     Charges: 2 TE; 1 man       Total Timed Treatment: 40 min  Total Treatment Time: 45 min

## 2024-07-27 ENCOUNTER — APPOINTMENT (OUTPATIENT)
Dept: PHYSICAL THERAPY | Age: 59
End: 2024-07-27
Attending: INTERNAL MEDICINE
Payer: COMMERCIAL

## 2024-08-01 ENCOUNTER — OFFICE VISIT (OUTPATIENT)
Facility: CLINIC | Age: 59
End: 2024-08-01
Payer: COMMERCIAL

## 2024-08-01 VITALS
OXYGEN SATURATION: 95 % | BODY MASS INDEX: 25.62 KG/M2 | WEIGHT: 183 LBS | HEIGHT: 71 IN | RESPIRATION RATE: 16 BRPM | HEART RATE: 66 BPM | SYSTOLIC BLOOD PRESSURE: 118 MMHG | DIASTOLIC BLOOD PRESSURE: 72 MMHG

## 2024-08-01 DIAGNOSIS — J45.50 SEVERE PERSISTENT ASTHMA WITHOUT COMPLICATION (HCC): Primary | ICD-10-CM

## 2024-08-01 PROCEDURE — 99204 OFFICE O/P NEW MOD 45 MIN: CPT | Performed by: INTERNAL MEDICINE

## 2024-08-01 RX ORDER — FLUTICASONE FUROATE, UMECLIDINIUM BROMIDE AND VILANTEROL TRIFENATATE 200; 62.5; 25 UG/1; UG/1; UG/1
1 POWDER RESPIRATORY (INHALATION) DAILY
Qty: 60 EACH | Refills: 5 | Status: SHIPPED | OUTPATIENT
Start: 2024-08-01

## 2024-08-01 RX ORDER — BUDESONIDE 0.5 MG/2ML
0.5 INHALANT ORAL DAILY
Qty: 120 ML | Refills: 5 | Status: SHIPPED | OUTPATIENT
Start: 2024-08-01

## 2024-08-01 NOTE — PROGRESS NOTES
[facility] General Pulmonary Consult Note    Chief Complaint:  Chief Complaint   Patient presents with    New Patient     Ref by PCP for frequent asthma flare ups CXR        History of Present Illness:  Devin Urrutia is a 59 year old male who presents today for evaluation of asthma.  Patient has longstanding history of asthma, has mostly been in control but over the past several months.  Not clear if an acute illness has triggered it at that time.  Has seasonal alleriges.  No known triggers.  Former smoker, quit 25 years ago, smoked for a long time.  No history of rashes/eczema.  Works in gas station business, has 1 cat.      Past Medical History:   Past Medical History:    Allergic rhinitis    Asthma (HCC)    High cholesterol    Hyperlipidemia    Visual impairment        Past Surgical History:   Past Surgical History:   Procedure Laterality Date    Arthroscopy of joint unlisted      Colonoscopy         Family Medical History:   Family History   Problem Relation Age of Onset    Heart Disorder Father     Hypertension Mother     Heart Disorder Mother     Asthma Mother         Social History:   Social History     Socioeconomic History    Marital status:      Spouse name: Not on file    Number of children: Not on file    Years of education: Not on file    Highest education level: Not on file   Occupational History    Not on file   Tobacco Use    Smoking status: Former     Current packs/day: 0.00     Average packs/day: 0.5 packs/day for 25.0 years (12.5 ttl pk-yrs)     Types: Cigarettes     Start date:      Quit date:      Years since quittin.6     Passive exposure: Past    Smokeless tobacco: Former    Tobacco comments:     N   Vaping Use    Vaping status: Never Used   Substance and Sexual Activity    Alcohol use: Never    Drug use: Never    Sexual activity: Not on file   Other Topics Concern    Caffeine Concern Yes     Comment: occassionally    Exercise Yes     Comment: every other day    Seat Belt Not  Asked    Special Diet Not Asked    Stress Concern Not Asked    Weight Concern Not Asked     Service Not Asked    Blood Transfusions Not Asked    Occupational Exposure Not Asked    Hobby Hazards Not Asked    Sleep Concern Not Asked    Back Care Not Asked    Bike Helmet Not Asked    Self-Exams Not Asked   Social History Narrative    Not on file     Social Determinants of Health     Financial Resource Strain: Not on file   Food Insecurity: Not on file   Transportation Needs: Not on file   Physical Activity: Not on file   Stress: Not on file   Social Connections: Unknown (3/14/2021)    [facility]    Social Connections     Conversations with friends/family/neighbors per week: Not on file   Housing Stability: Low Risk  (7/8/2021)    [facility]    Housing Stability     Mortgage Payment Concerns?: Not on file     Number of Places Lived in the Last Year: Not on file     Unstable Housing?: Not on file        Allergies: Patient has no known allergies.     Medications:   Current Outpatient Medications   Medication Sig Dispense Refill    fluticasone-umeclidin-vilant (TRELEGY ELLIPTA) 200-62.5-25 MCG/ACT Inhalation Aerosol Powder, Breath Activated Inhale 1 puff into the lungs daily. 60 each 5    budesonide 0.5 MG/2ML Inhalation Suspension Take 2 mL (0.5 mg total) by nebulization daily. 120 mL 5    atorvastatin 10 MG Oral Tab Take 1 tablet (10 mg total) by mouth nightly. 90 tablet 1    albuterol 108 (90 Base) MCG/ACT Inhalation Aero Soln Inhale 1 puff into the lungs every 6 (six) hours as needed. 1 each 1    Meloxicam 15 MG Oral Tab Take 1 tablet (15 mg total) by mouth daily. 30 tablet 2    gabapentin 100 MG Oral Cap Take 3 capsules (300 mg total) by mouth nightly. 100mg at bedtime x 1 week then increase to 200mg at bedtime x 1 week then 300mg at bedtime 90 capsule 2    alfuzosin ER 10 MG Oral  Tablet 24 Hr Take 1 tablet (10 mg total) by mouth daily. 90 tablet 5    FLOVENT  MCG/ACT Inhalation Aerosol Inhale 2 puffs into the lungs 2 (two) times daily. 1 each 3    montelukast 10 MG Oral Tab Take 1 tablet (10 mg total) by mouth nightly. 90 tablet 1    Respiratory Therapy Supplies (NEBULIZER) Does not apply Device Please use every 6 hour as needed for Shortness of breath, wheezing 1 Device 0    cetirizine 5 MG Oral Tab Take 1 tablet (5 mg total) by mouth daily.         Review of Systems: Review of Systems    Physical Exam:  /72 (BP Location: Right arm, Patient Position: Sitting, Cuff Size: adult)   Pulse 66   Resp 16   Ht 5' 11\" (1.803 m)   Wt 183 lb (83 kg)   SpO2 95%   BMI 25.52 kg/m²      Constitutional: alert, cooperative. No acute distress.  HEENT: Head NC/AT. Nares normal. Septum midline. Mucosa normal. No drainage or sinus tenderness.. Mallampati 2+  Cardio: Regular rate and rhythm. Normal S1 and S2. No murmurs.   Respiratory: Thorax symmetrical with no labored breathing. clear to auscultation bilaterally  GI: NABS. Abd soft, non-tender.  Extremities: No clubbing or cyanosis. No BLE edema.    Neurologic: A&Ox3. No gross motor deficits.  Skin: Warm, dry  Psych: Calm, cooperative. Pleasant affect.    Results:  Personally reviewed  WBC: 7.2, done on 1/23/2024.  HGB: 15.2, done on 1/23/2024.  PLT: 248, done on 1/23/2024.     Glucose: 103, done on 1/23/2024.  Cr: 0.96, done on 1/23/2024.  Last eGFR was 93 on 1/23/2024.  CA: 8.8, done on 1/23/2024.  Na: 140, done on 1/23/2024.  K: 4.1, done on 1/23/2024.  Cl: 110, done on 1/23/2024.  CO2: 29, done on 1/23/2024.  Last ALB was 3.8% done on 1/23/2024.     XR HAND (MIN 3 VIEWS), RIGHT (CPT=73130)    Result Date: 6/13/2024  CONCLUSION:  No evidence of acute displaced fracture or dislocation in the right hand.  LOCATION:  [facility]   Dictated by (CST): Ricardo Sarah MD on 6/13/2024 at 8:08 AM     Finalized by (CST): Ricardo Sarah MD on 6/13/2024  at 8:08 AM       XR HAND (MIN 3 VIEWS), LEFT (CPT=73130)    Result Date: 6/13/2024  CONCLUSION:  No evidence of acute displaced fracture or dislocation in the left hand.  LOCATION:  [facility]   Dictated by (CST): Ricardo Sarah MD on 6/13/2024 at 8:07 AM     Finalized by (CST): Ricardo Sarah MD on 6/13/2024 at 8:08 AM       XR KNEE (1 OR 2 VIEWS), RIGHT (CPT=73560)    Result Date: 6/13/2024  CONCLUSION:  No evidence of acute displaced fracture or dislocation in the right knee.  LOCATION:  [facility]   Dictated by (CST): Ricardo Sarah MD on 6/13/2024 at 8:07 AM     Finalized by (CST): Ricardo Sarah MD on 6/13/2024 at 8:07 AM       XR KNEE (1 OR 2 VIEWS), LEFT (CPT=73560)    Result Date: 6/13/2024  CONCLUSION:  No evidence of acute displaced fracture or dislocation in the left knee.  LOCATION:  [facility]   Dictated by (CST): Ricardo Sarah MD on 6/13/2024 at 8:07 AM     Finalized by (CST): Ricardo Sarah MD on 6/13/2024 at 8:07 AM       XR LUMBAR SPINE (MIN 4 VIEWS) (CPT=72110)    Result Date: 6/13/2024  CONCLUSION:  No acute fracture or subluxation in lumbar spine.  Degenerative changes as above.   LOCATION:  [facility]   Dictated by (CST): Ricardo Sarah MD on 6/13/2024 at 8:06 AM     Finalized by (CST): Ricardo Sarah MD on 6/13/2024 at 8:07 AM       XR PELVIS (COMPLETE MIN 3 VIEWS) (CPT=72190)    Result Date: 6/13/2024  CONCLUSION:  No evidence of acute displaced fracture or dislocation in the pelvis.  LOCATION:  [facility]   Dictated by (CST): Ricardo Sarah MD on 6/13/2024 at 8:05 AM     Finalized by (CST): Ricardo Sarah MD on 6/13/2024 at 8:06 AM       XR CERVICAL SPINE (2-3 VIEWS) (CPT=72040)    Result Date: 6/13/2024  CONCLUSION:  No acute fracture or subluxation in the cervical spine.  Degenerative changes as above.   LOCATION:  [facility]   Dictated by (CST): Ricardo Sarah MD on 6/13/2024 at 8:04 AM     Finalized by (CST): Ricardo Sarah MD on 6/13/2024 at 8:05 AM       XR CHEST PA + LAT CHEST  (CPT=71046)    Result Date: 6/13/2024  CONCLUSION:  Peribronchial thickening with mild hyperinflation could be related to bronchitis and/or asthma. Clinical correlation recommended.   LOCATION:  [facility]   Dictated by (CST): Ricardo Sarah MD on 6/13/2024 at 8:04 AM     Finalized by (CST): Ricardo Sarah MD on 6/13/2024 at 8:04 AM         Assessment/Plan:  #1. Severe persistent asthma  Change from advair to trelegy  Start budesonide nebs  Check PFTs  Check asthma allergen screen  Will consider singulair in near future as well  Peripheral eos in the past have been 630, may be candidate for fasenra    #2. Allergic rhinosinusitis  Trial of navage or other nasal rinsing system and then flonase afterwards     Return in about 6 weeks (around 9/12/2024).    Lexus Vieyra MD  8/1/2024

## 2024-08-05 ENCOUNTER — TELEPHONE (OUTPATIENT)
Dept: ORTHOPEDICS CLINIC | Facility: CLINIC | Age: 59
End: 2024-08-05

## 2024-08-05 NOTE — PROGRESS NOTES
Diagnosis:   Lumbar paraspinal muscle spasm (M62.830)  Myofascial pain dysfunction syndrome (M79.18)        Referring Provider: Morena Olguin  Date of Evaluation:    7/15/24    Precautions:  None Next MD visit:   none scheduled  Date of Surgery: n/a   Insurance Primary/Secondary: [employer] / N/A     # Auth Visits: 5            Subjective:   Pt states his back was flared up two days ago but is feeling a little better today. He explains he is having a procedure on 8/13/24 for R knee.     Pain: 1/10      Objective:   MMT Hip Abduction  R: 3+/5 (IE 3+/5)  L: 3+/5 (IE 3+/5)    Assessment:   Pt continued BLE and trunk strengthening exercises this date. Able to progress resistance and repetitions of hip strengthening exercises with no reports of increased pain. Attempted to initiate Bird Dogs with pt unable to assume initial position secondary to knee pain. Verbal cues given throughout session for TA activation with concurrent deep breathing. Min carryover noted. HEP progressed to include clamshells w/ reinforcement of importance of home participation. Pt should continue skilled physical therapy to address persisting functional limitations including sleeping, prolonged standing, walking > 20 minutes, and going to [facility].      Goals:   Pt will improve transversus abdominis recruitment to perform proper isometric contraction without requiring verbal or tactile cuing to promote advancement of therex   Pt will demonstrate good understanding of proper posture and body mechanics to decrease pain and improve spinal safety   Pt will improve B hip abduction strength to >/= 4/5 in order to walk greater distances with no increase in pain  Pt will report improved sleeping ability as noted by sleeping through the night will </= 2 interruptions due to pain  Pt will have decreased paraspinal mm tension to tolerate standing >10 minutes for work and home activities   Pt will demonstrate improved B hip extensor strength to stand at work  with <3/10 pain   Pt will be independent and compliant with comprehensive HEP to maintain progress achieved in PT      Plan:   Date: 7/17/2024  TX#: 2/5 Date:   7/24/24              TX#: 3/5 Date:  8/6/24               TX#: 4/5 Date:                 TX#: 5/ Date:   Tx#: 6/   Manual Therapy - 10 min Manual Therapy : 10 min  Manual Therapy : 10 min      STM and TrP release B QL and thoracic /lumbar paraspinals  STM and TrP release   B QL an thoracic / lumbar paraspinals  STM and TrP release   B QL an thoracic / lumbar paraspinals             Therex - 31 min Therex : 35 min  Therex : 35 min      Standing Abduction  10 x 2 Standing hip abd with YTB and with TrA activation 2 x 10  Standing hip abd with GTB and with TrA activation 3 x 10      Standing Hip Ext   RTB  10 x 2 Standing hip ext with TrA activation with RTB 2 x 10  Prone Hip Ext   W/ knee bent   2 x 10     Clamshells  RTB  10 x 2 Clam shell with TrA activation with RTB   10 x 2  Clam shell with TrA activation with RTB   10 x B     Bridges  10 x 2 Bridging with TrA activation with adductors activation with yellow ball 2 x 10   Bridging with TrA and abductors activation with fitness ring  2 x 10   Supine :  R/L piriformis stretch ( modified figure 4 ) 5 reps each side x 10 sec hold  HEP     Seated Hamstring Stretch   B 30'' x 3  Prone Quad Stretch  30'' x 3 B     Shuttle Press   50#  B x 15 Shuttle :   B leg press with 50 lbs   2 x 10   R/L leg press with 25 lbs 2 x 10  Shuttle :   B leg press w/ 56#   2 x 10   R/L leg press w/ 37#   2 x 10   Calf Raise w/ 50# x 15     TA bracing 90/90   15'' x 3  Supine :  TA bracing 9/90   10 reps x 10 sec hold   TrA activation with alt marches 2 x 10   TrA activation with marches out / in   X 10  Supine:  TA Bracing 90/90 - 2 x 25''  TA Bracing w/ alt marches 2 x 10     HEP: Access Code: [email or web address]  URL: [email or web address]  Date: 08/06/2024  Prepared by: Randi Adam    Exercises  - Supine Lower Trunk  Rotation  - 1 x daily - 7 x weekly - 3 sets - 10 reps  - Seated Hamstring Stretch  - 1 x daily - 7 x weekly - 3 sets - 30\" hold  - Seated Piriformis Stretch with Trunk Bend  - 1 x daily - 7 x weekly - 3 sets - 30\" hold  - Clamshell with Resistance  - 1 x daily - 7 x weekly - 3 sets - 10 reps  ( 7/24/24 ) supine : bridging with TrA and adductors activation 2 x 10 , alt marches with TrA activation 2 x 10 , 90/90 heel touches with TrA activation 2 x 10 to be performed 1-2 times a day . ( Handout provided )     Charges:1 man; 2 TE  Total Timed Treatment: 45 min  Total Treatment Time: 45 min

## 2024-08-05 NOTE — TELEPHONE ENCOUNTER
Rescheduled to:    Date of Surgery: 8/13/2024       Post Op Appt:  8/21/2024  1040AM    Case ID:  [id number]

## 2024-08-06 ENCOUNTER — OFFICE VISIT (OUTPATIENT)
Dept: PHYSICAL THERAPY | Age: 59
End: 2024-08-06
Attending: INTERNAL MEDICINE
Payer: COMMERCIAL

## 2024-08-06 PROBLEM — K63.5 HYPERPLASTIC POLYP OF TRANSVERSE COLON: Status: ACTIVE | Noted: 2020-01-06

## 2024-08-06 PROCEDURE — 97110 THERAPEUTIC EXERCISES: CPT

## 2024-08-06 PROCEDURE — 97140 MANUAL THERAPY 1/> REGIONS: CPT

## 2024-08-07 ENCOUNTER — APPOINTMENT (OUTPATIENT)
Dept: PHYSICAL THERAPY | Age: 59
End: 2024-08-07
Attending: INTERNAL MEDICINE
Payer: COMMERCIAL

## 2024-08-12 PROBLEM — M15.0 PRIMARY OSTEOARTHRITIS INVOLVING MULTIPLE JOINTS: Status: RESOLVED | Noted: 2024-06-12 | Resolved: 2024-08-12

## 2024-08-12 RX ORDER — MELOXICAM 15 MG/1
15 TABLET ORAL DAILY
Qty: 14 TABLET | Refills: 1 | Status: SHIPPED | OUTPATIENT
Start: 2024-08-12

## 2024-08-12 RX ORDER — ONDANSETRON 4 MG/1
TABLET, FILM COATED ORAL
Qty: 8 TABLET | Refills: 0 | Status: SHIPPED | OUTPATIENT
Start: 2024-08-12

## 2024-08-12 RX ORDER — TRAMADOL HYDROCHLORIDE 50 MG/1
TABLET ORAL
Qty: 20 TABLET | Refills: 1 | Status: SHIPPED | OUTPATIENT
Start: 2024-08-12

## 2024-08-12 NOTE — DISCHARGE INSTRUCTIONS
Knee Arthroscopy  Postoperative Guidelines      This document will help you plan for your post-operative recovery course following surgery. Please read and retain this information for future reference. Many of the questions you may have later can be answered by referring to this information.      DIET   Begin with clear liquids and light foods (jellos, soups, etc.).   Progress to your normal diet if you are not experiencing nausea.       WOUND CARE   Please keep the ACE wrap on for the first 2-3 days, you may remove the ACE wrap for ice therapy.  Underneath the ACE wrap are waterproof bandages, please keep these in place until your first post-op appointment with Dr. Sandoval.  Under the waterproof bandages is Dermabond, this is a surgical glue and tape that is used in conjunction with absorbable stitches to close the incision.   Please do not touch the Dermabond or place any ointments lotions or creams directly over the incisions.  You may shower after removing the ACE wrap by placing Saran wrap around the leg and covering the bandages.  NO soaking of the operative leg (ie: bath or pool) is allowed until 6 weeks after surgery.     CRUTCHES   Crutches are to aid your progression to walking.   You may discontinue the crutches as soon as you are comfortable with full weight on the leg (usually 1-3 days). Your physical therapist may guide you with this process.     PAIN MANAGEMENT  Local numbing medications are injected into the wound around the knee at the time of surgery. These will wear off within 8-12 hours and it is not uncommon for you to encounter more pain on the first or second day after surgery when swelling peaks.   Do not drive a car or operate machinery while taking the narcotic medication.   Please avoid alcohol use while taking the narcotic pain medication.     NON-NARCOTIC PAIN MEDICATIONS   Extra-Strength Tylenol (Acetaminophen) 500mg Tablets (available over the counter)  Indication: pain, non-narcotic  pain reliever  Use: Take 1-2 tablets every 6 hours.  Do not exceed 8 tablets in a 24-hour period.  Mobic (Meloxicam) 15mg Tablets (Prescription sent to pharmacy)  Indication: pain and anti-inflammatory, non-narcotic pain reliever  Use: Take 1 tablets daily with meals for the first 14 days after surgery.  Side Effects: upset stomach, acid reflux.  If this occurs, stop the medication.    You received a drug called Toradol which is an Anti Inflammatory at: 2:15 pm  Do not take any Anti Inflammatory like Motrin, Aleve or Ibuprophen until after: 8:15 pm  Please report any suspected allergic reactions or bleeding issues to your doctor     NARCOTIC PAIN MEDICATION  Tramadol is a prescription pain medication that should only be used if adequate pain control is not achieved with combination of ice, extra-strength Tylenol and Aleve as outlined above.   Side effects include constipation, nausea, drowsiness, dry mouth, itchiness.  Use a 0-10 pain scale to decide how much medication to take:                                Pain 0-4/10: No tramadol necessary                                Pain 5-7/10: Take one tablet                                 Pain 8-10/10: Take two tablets   Tramadol 50mg tablet  Indication: pain 5/10 or greater.  Narcotic pain medication.  Use: 1-2 tabs every 4-6 hours as needed for pain.  Do not exceed more than 10 tabs in any 24-hour time period unless otherwise directed.    MEDICATIONS TO MANAGE SIDE EFFECTS  Narcotics may cause nausea and constipation. Bowel regimen is recommended.  Colace (Docusate) 100 mg - available OTC  Indication:  constipation, stool softener.  Take consistently while on narcotics.  Use:  Take 1 pill three times per day while you are taking narcotics.    Senokot (Senna) 8.6 mg - available OTC  Indication: constipation, stool laxative.  Take consistently while on narcotics.   Use: Take 2 pills at bedtime.  Increase to 2 pills twice daily if no bowel movement by post-surgery day  two.    Zofran (Ondansetron ODT) 4 mg- Prescription sent to pharmacy  Indication: nausea.  Take as needed.  Use: Take 1 pill AS NEEDED every 8 hours, do not exceed 3 pills in 24 hours      ACTIVITY   Raise the operative leg to chest level whenever possible to decrease swelling.   Do not place pillows under knees (i.e. do not maintain knee in a flexed or bent position), but rather place pillows under the foot/ankle.   Use crutches to assist with walking for the first 24-48 hours  Do not engage in activities which increase knee pain/swelling (prolonged periods of standing or walking) for the first 2 weeks following surgery.   Avoid long periods of sitting (without leg elevated) or long distance traveling for 2 weeks.   NO driving until instructed otherwise by physician.   You may return to sedentary work or school 1-2 days after surgery if feeling well.    You will not be needing a knee brace      ICE THERAPY   Icing is very important in the initial post-operative period and should begin immediately after surgery.   Use ice packs / bags for 30-45 minutes every 2 hours daily until there is no swelling and pain is relieved - remember to keep leg elevated to level of chest while icing. Care should be taken with icing to avoid frostbite to the skin.       EXERCISES   Begin exercises 24 hours after surgery, including knee extension, quad sets, straight leg raises, and active flexion / extension unless otherwise instructed. (Please see handout)  Discomfort and knee stiffness are normal for a few days following surgery. It is safe to bend your knee in a non-weightbearing position when performing exercises unless otherwise instructed.   Complete exercises 3-4 times daily until your first post-operative visit - your motion goals are to have complete extension (straightening) and 90 degrees of flexion (bending) at your first post- operative appointment unless otherwise instructed.   Perform ankle pumps continuously throughout  the day to reduce the risk of developing a blood clot in your calf.   Formal physical therapy (PT) typically begins as soon as possible, ideally 1-3 days after surgery. A prescription and protocol will be provided prior to surgery.            EMERGENCIES**   Contact Dr. Sandoval’s Team via [employer] or [phone] if any of the following are present:     Painful swelling or numbness (note that some swelling and numbness is normal).   Unrelenting pain.  Fever (over 101° - it is normal to have a low-grade fever for the first day or two following surgery) or chills.   Redness around incisions.   Color change in foot or ankle.   Continuous drainage or bleeding from incision (a small amount of drainage is expected).   Difficulty breathing.   Excessive nausea/vomiting   Severe calf pain.   If you have an emergency after office hours or on the weekend, contact the office at [phone] and you will be connected to our pager service. This will connect you with the Physician on call.   If you have an emergency that requires immediate attention proceed to the nearest emergency room.       FOLLOW-UP CARE/QUESTIONS   Your first post-operative appointment will be 7-10 days following surgery for wound evaluation and to answer any questions you have regarding the procedure.   Typically, the first physical therapy appointment is made for 1-3 days following surgery. This prescription will be communicated prior to surgery.  If you have any further questions please contact Dr. Sandoval’s team directly.

## 2024-08-13 ENCOUNTER — APPOINTMENT (OUTPATIENT)
Dept: PHYSICAL THERAPY | Age: 59
End: 2024-08-13
Attending: INTERNAL MEDICINE
Payer: COMMERCIAL

## 2024-08-13 ENCOUNTER — ANESTHESIA (OUTPATIENT)
Dept: SURGERY | Facility: HOSPITAL | Age: 59
End: 2024-08-13
Payer: COMMERCIAL

## 2024-08-13 ENCOUNTER — ANESTHESIA EVENT (OUTPATIENT)
Dept: SURGERY | Facility: HOSPITAL | Age: 59
End: 2024-08-13
Payer: COMMERCIAL

## 2024-08-13 ENCOUNTER — HOSPITAL ENCOUNTER (OUTPATIENT)
Facility: HOSPITAL | Age: 59
Setting detail: HOSPITAL OUTPATIENT SURGERY
Discharge: HOME | End: 2024-08-13
Attending: ORTHOPAEDIC SURGERY | Admitting: ORTHOPAEDIC SURGERY
Payer: COMMERCIAL

## 2024-08-13 VITALS
DIASTOLIC BLOOD PRESSURE: 96 MMHG | HEART RATE: 65 BPM | TEMPERATURE: 97 F | OXYGEN SATURATION: 97 % | RESPIRATION RATE: 18 BRPM | HEIGHT: 71 IN | BODY MASS INDEX: 26.04 KG/M2 | SYSTOLIC BLOOD PRESSURE: 150 MMHG | WEIGHT: 186 LBS

## 2024-08-13 DIAGNOSIS — Z98.890 S/P ARTHROSCOPY OF RIGHT KNEE: Primary | ICD-10-CM

## 2024-08-13 PROCEDURE — 07DR3ZZ EXTRACTION OF ILIAC BONE MARROW, PERCUTANEOUS APPROACH: ICD-10-PCS | Performed by: ORTHOPAEDIC SURGERY

## 2024-08-13 PROCEDURE — 0SBC4ZZ EXCISION OF RIGHT KNEE JOINT, PERCUTANEOUS ENDOSCOPIC APPROACH: ICD-10-PCS | Performed by: ORTHOPAEDIC SURGERY

## 2024-08-13 PROCEDURE — 0SUC47Z SUPPLEMENT RIGHT KNEE JOINT WITH AUTOLOGOUS TISSUE SUBSTITUTE, PERCUTANEOUS ENDOSCOPIC APPROACH: ICD-10-PCS | Performed by: ORTHOPAEDIC SURGERY

## 2024-08-13 RX ORDER — ONDANSETRON 2 MG/ML
INJECTION INTRAMUSCULAR; INTRAVENOUS AS NEEDED
Status: DISCONTINUED | OUTPATIENT
Start: 2024-08-13 | End: 2024-08-13 | Stop reason: SURG

## 2024-08-13 RX ORDER — BUPIVACAINE HYDROCHLORIDE 5 MG/ML
INJECTION, SOLUTION EPIDURAL; INTRACAUDAL AS NEEDED
Status: DISCONTINUED | OUTPATIENT
Start: 2024-08-13 | End: 2024-08-13 | Stop reason: HOSPADM

## 2024-08-13 RX ORDER — ACETAMINOPHEN 500 MG
1000 TABLET ORAL ONCE AS NEEDED
Status: DISCONTINUED | OUTPATIENT
Start: 2024-08-13 | End: 2024-08-13

## 2024-08-13 RX ORDER — ACETAMINOPHEN 500 MG
1000 TABLET ORAL ONCE
Status: COMPLETED | OUTPATIENT
Start: 2024-08-13 | End: 2024-08-13

## 2024-08-13 RX ORDER — NALOXONE HYDROCHLORIDE 0.4 MG/ML
0.08 INJECTION, SOLUTION INTRAMUSCULAR; INTRAVENOUS; SUBCUTANEOUS AS NEEDED
Status: DISCONTINUED | OUTPATIENT
Start: 2024-08-13 | End: 2024-08-13

## 2024-08-13 RX ORDER — ALBUTEROL SULFATE 2.5 MG/3ML
2.5 SOLUTION RESPIRATORY (INHALATION) AS NEEDED
Status: DISCONTINUED | OUTPATIENT
Start: 2024-08-13 | End: 2024-08-13

## 2024-08-13 RX ORDER — HYDROMORPHONE HYDROCHLORIDE 1 MG/ML
0.2 INJECTION, SOLUTION INTRAMUSCULAR; INTRAVENOUS; SUBCUTANEOUS EVERY 5 MIN PRN
Status: DISCONTINUED | OUTPATIENT
Start: 2024-08-13 | End: 2024-08-13

## 2024-08-13 RX ORDER — KETOROLAC TROMETHAMINE 30 MG/ML
INJECTION, SOLUTION INTRAMUSCULAR; INTRAVENOUS AS NEEDED
Status: DISCONTINUED | OUTPATIENT
Start: 2024-08-13 | End: 2024-08-13 | Stop reason: SURG

## 2024-08-13 RX ORDER — HYDROMORPHONE HYDROCHLORIDE 1 MG/ML
0.6 INJECTION, SOLUTION INTRAMUSCULAR; INTRAVENOUS; SUBCUTANEOUS EVERY 5 MIN PRN
Status: DISCONTINUED | OUTPATIENT
Start: 2024-08-13 | End: 2024-08-13

## 2024-08-13 RX ORDER — LIDOCAINE HYDROCHLORIDE 10 MG/ML
INJECTION, SOLUTION EPIDURAL; INFILTRATION; INTRACAUDAL; PERINEURAL AS NEEDED
Status: DISCONTINUED | OUTPATIENT
Start: 2024-08-13 | End: 2024-08-13 | Stop reason: SURG

## 2024-08-13 RX ORDER — HYDROCODONE BITARTRATE AND ACETAMINOPHEN 5; 325 MG/1; MG/1
1 TABLET ORAL ONCE AS NEEDED
Status: DISCONTINUED | OUTPATIENT
Start: 2024-08-13 | End: 2024-08-13

## 2024-08-13 RX ORDER — SCOLOPAMINE TRANSDERMAL SYSTEM 1 MG/1
1 PATCH, EXTENDED RELEASE TRANSDERMAL ONCE
Status: DISCONTINUED | OUTPATIENT
Start: 2024-08-13 | End: 2024-08-13 | Stop reason: HOSPADM

## 2024-08-13 RX ORDER — EPHEDRINE SULFATE 50 MG/ML
INJECTION INTRAVENOUS AS NEEDED
Status: DISCONTINUED | OUTPATIENT
Start: 2024-08-13 | End: 2024-08-13 | Stop reason: SURG

## 2024-08-13 RX ORDER — ONDANSETRON 2 MG/ML
4 INJECTION INTRAMUSCULAR; INTRAVENOUS EVERY 6 HOURS PRN
Status: DISCONTINUED | OUTPATIENT
Start: 2024-08-13 | End: 2024-08-13

## 2024-08-13 RX ORDER — DEXAMETHASONE SODIUM PHOSPHATE 4 MG/ML
VIAL (ML) INJECTION AS NEEDED
Status: DISCONTINUED | OUTPATIENT
Start: 2024-08-13 | End: 2024-08-13 | Stop reason: SURG

## 2024-08-13 RX ORDER — HYDROCODONE BITARTRATE AND ACETAMINOPHEN 5; 325 MG/1; MG/1
2 TABLET ORAL ONCE AS NEEDED
Status: DISCONTINUED | OUTPATIENT
Start: 2024-08-13 | End: 2024-08-13

## 2024-08-13 RX ORDER — HYDROMORPHONE HYDROCHLORIDE 1 MG/ML
0.4 INJECTION, SOLUTION INTRAMUSCULAR; INTRAVENOUS; SUBCUTANEOUS EVERY 5 MIN PRN
Status: DISCONTINUED | OUTPATIENT
Start: 2024-08-13 | End: 2024-08-13

## 2024-08-13 RX ORDER — SODIUM CHLORIDE, SODIUM LACTATE, POTASSIUM CHLORIDE, CALCIUM CHLORIDE 600; 310; 30; 20 MG/100ML; MG/100ML; MG/100ML; MG/100ML
INJECTION, SOLUTION INTRAVENOUS CONTINUOUS
Status: DISCONTINUED | OUTPATIENT
Start: 2024-08-13 | End: 2024-08-13

## 2024-08-13 RX ADMIN — SODIUM CHLORIDE, SODIUM LACTATE, POTASSIUM CHLORIDE, CALCIUM CHLORIDE: 600; 310; 30; 20 INJECTION, SOLUTION INTRAVENOUS at 13:33:00

## 2024-08-13 RX ADMIN — EPHEDRINE SULFATE 10 MG: 50 INJECTION INTRAVENOUS at 13:45:00

## 2024-08-13 RX ADMIN — SODIUM CHLORIDE, SODIUM LACTATE, POTASSIUM CHLORIDE, CALCIUM CHLORIDE: 600; 310; 30; 20 INJECTION, SOLUTION INTRAVENOUS at 14:02:00

## 2024-08-13 RX ADMIN — DEXAMETHASONE SODIUM PHOSPHATE 4 MG: 4 MG/ML VIAL (ML) INJECTION at 13:41:00

## 2024-08-13 RX ADMIN — KETOROLAC TROMETHAMINE 30 MG: 30 INJECTION, SOLUTION INTRAMUSCULAR; INTRAVENOUS at 14:15:00

## 2024-08-13 RX ADMIN — LIDOCAINE HYDROCHLORIDE 50 MG: 10 INJECTION, SOLUTION EPIDURAL; INFILTRATION; INTRACAUDAL; PERINEURAL at 13:36:00

## 2024-08-13 RX ADMIN — ONDANSETRON 4 MG: 2 INJECTION INTRAMUSCULAR; INTRAVENOUS at 14:22:00

## 2024-08-13 NOTE — ANESTHESIA PREPROCEDURE EVALUATION
PRE-OP EVALUATION    Patient Name: Devin Urrutia    Admit Diagnosis: Chondromalacia of right knee [M94.261]    Pre-op Diagnosis: Chondromalacia of right knee [M94.261]    RIGHT KNEE ARTHROSCOPY SYNOVECTOMY STEM CELLS FROM BONE MARROW ASPIRATION FROM RIGHT ILIAC CREST TO RIGHT KNEE    Anesthesia Procedure: RIGHT KNEE ARTHROSCOPY SYNOVECTOMY STEM CELLS FROM BONE MARROW ASPIRATION FROM RIGHT ILIAC CREST TO RIGHT KNEE (Right)    Surgeons and Role:     * Jennifer Sandoval MD - Primary    Pre-op vitals reviewed.  Temp: 98.2 °F (36.8 °C)  Pulse: 68  Resp: 16  BP: 147/94  SpO2: 98 %  Body mass index is 25.94 kg/m².    Current medications reviewed.  Hospital Medications:   [COMPLETED] acetaminophen (Tylenol Extra Strength) tab 1,000 mg  1,000 mg Oral Once    scopolamine (Transderm-Scop) 1 MG/3DAYS patch 1 patch  1 patch Transdermal Once    lactated ringers infusion   Intravenous Continuous    ceFAZolin (Ancef) 2g in 10mL IV syringe premix  2 g Intravenous Once       Outpatient Medications:     Medications Prior to Admission   Medication Sig Dispense Refill Last Dose    fluticasone-umeclidin-vilant (TRELEGY ELLIPTA) 200-62.5-25 MCG/ACT Inhalation Aerosol Powder, Breath Activated Inhale 1 puff into the lungs daily. 60 each 5 Past Week    budesonide 0.5 MG/2ML Inhalation Suspension Take 2 mL (0.5 mg total) by nebulization daily. 120 mL 5 Past Week    atorvastatin 10 MG Oral Tab Take 1 tablet (10 mg total) by mouth nightly. 90 tablet 1 8/11/2024    albuterol 108 (90 Base) MCG/ACT Inhalation Aero Soln Inhale 1 puff into the lungs every 6 (six) hours as needed. 1 each 1 Past Week    Meloxicam 15 MG Oral Tab Take 1 tablet (15 mg total) by mouth daily. 30 tablet 2 Past Week    gabapentin 100 MG Oral Cap Take 3 capsules (300 mg total) by mouth nightly. 100mg at bedtime x 1 week then increase to 200mg at bedtime x 1 week then 300mg at bedtime 90 capsule 2 Past Week    alfuzosin ER 10 MG Oral Tablet 24 Hr Take 1 tablet (10 mg total) by  mouth daily. (Patient taking differently: Take 1 tablet (10 mg total) by mouth daily.) 90 tablet 5 2024    cetirizine 5 MG Oral Tab Take 1 tablet (5 mg total) by mouth daily.       FLOVENT  MCG/ACT Inhalation Aerosol Inhale 2 puffs into the lungs 2 (two) times daily. 1 each 3 Past Week    montelukast 10 MG Oral Tab Take 1 tablet (10 mg total) by mouth nightly. 90 tablet 1 Past Week    Respiratory Therapy Supplies (NEBULIZER) Does not apply Device Please use every 6 hour as needed for Shortness of breath, wheezing 1 Device 0        Allergies: Patient has no known allergies.      Anesthesia Evaluation    Patient summary reviewed.    Anesthetic Complications  (-) history of anesthetic complications         GI/Hepatic/Renal    Negative GI/hepatic/renal ROS.                             Cardiovascular    Negative cardiovascular ROS.                                     (-) angina     (-) STINSON         Endo/Other    Negative endo/other ROS.                              Pulmonary      (+) asthma                     Neuro/Psych                 (+) neuromuscular disease                     Past Surgical History:   Procedure Laterality Date    Arthroscopy of joint unlisted      Colonoscopy       Social History     Socioeconomic History    Marital status:    Tobacco Use    Smoking status: Former     Current packs/day: 0.00     Average packs/day: 0.5 packs/day for 25.0 years (12.5 ttl pk-yrs)     Types: Cigarettes     Start date:      Quit date:      Years since quittin.6     Passive exposure: Past    Smokeless tobacco: Former    Tobacco comments:     N   Vaping Use    Vaping status: Never Used   Substance and Sexual Activity    Alcohol use: Never    Drug use: Never   Other Topics Concern    Caffeine Concern Yes     Comment: occassionally    Exercise Yes     Comment: every other day     History   Drug Use Unknown     Available pre-op labs reviewed.               Airway      Mallampati: II  Mouth  opening: >3 FB  TM distance: > 6 cm  Neck ROM: full Cardiovascular    Cardiovascular exam normal.         Dental    Dentition appears grossly intact         Pulmonary    Pulmonary exam normal.                 Other findings              ASA: 2   Plan: general  NPO status verified and Patient is NPO: patient finished gatorade at 0530, will wait until 0730.    Post-procedure pain management plan discussed with surgeon and patient.    Comment: Discussed risks including PONV, sore throat, dental damage, cardiac and respiratory complications. All questions answered.  Plan/risks discussed with: patient  Use of blood product(s) discussed with: patient              Present on Admission:  **None**

## 2024-08-13 NOTE — INTERVAL H&P NOTE
Pre-op Diagnosis: Chondromalacia of right knee [M94.261]    The above referenced H&P was reviewed by Jennifer Sandoval MD on 8/13/2024, the patient was examined and no significant changes have occurred in the patient's condition since the H&P was performed.  I discussed with the patient and/or legal representative the potential benefits, risks and side effects of this procedure; the likelihood of the patient achieving goals; and potential problems that might occur during recuperation.  I discussed reasonable alternatives to the procedure, including risks, benefits and side effects related to the alternatives and risks related to not receiving this procedure.  We will proceed with procedure as planned.

## 2024-08-13 NOTE — OPERATIVE REPORT
OPERATIVE REPORT  ATTENDING SURGEON: LAURA MAC MD  ASSISTANT(S): Andi Medina (AJ)  STATEMENT OF MEDICAL NECESSITY  The aid of assistant Andi Medina (AJ) was needed for patient positioning, preparation, drape, retraction,  wound closure, dressing application and critical portions of the procedure.   PRELIMINARY DIAGNOSIS:  1.  Right Patellofemoral and Medial Femoral Chondromalacia  2.  Right Knee synovitis involving multiple compartments    POSTOPERATIVE DIAGNOSIS:  1.  Right Patellofemoral and Medial Femoral Chondromalacia  2.  Right Knee synovitis involving multiple compartments    THE OPERATION:  1.  Right Knee Arthroscopy, Abrasionplasty of Medial Femoral Condyle (26187)  2.  Right Knee arthroscopic extensive debridement and synovectomy involving multiple compartments (64604)      ANESTHESIA: GA + Regional Block  ANESTHESIOLOGIST:  MD Sukhjinder  ANTIBIOTICS: Cefazolin 2g within 60 minutes of surgical incision.    IMPLANTS:   None  PATHOLOGY/CULTURES: None  ESTIMATED BLOOD LOSS: Blood Output: 5 mL (8/13/2024  2:22 PM)    DRAINS: None  COMPLICATIONS: No intraoperative nor immediate postoperative complications noted.  COUNTS: All sponge and needle counts were correct at the conclusion of the procedure.  TOURNIQUET TIME: Not Applicable  OPERATIVE FINDINGS:  Evidence of near full-thickness articular cartilage wear on the weightbearing medial femoral condyle over an area of approximately 10 x 15 mm.  This was managed with abrasion plasty down to bleeding bone using curved mechanical 4.0 mm shaver.  It was confirmed to a stable peripheral margin.  Mild grade 2 articular cartilage wear overlying the patella diffusely.  Areas of full-thickness central trochlear articular cartilage wear.  Approximately 10 x 10 mm.  Adequate debridement and synovectomy performed in the medial, lateral patellofemoral compartments.  Bone marrow aspirate concentrate from the right iliac crest was obtained and used for  augmentation purposes.    Indications:  Devin is a 59 year old male with history, physical examination, and imaging findings consistent with the aforementioned pathology that has been managed with extensive nonsurgical management.  Physical therapy greater than 3 months, intra-articular corticosteroid and ketorolac injection, activity modification, and anti-inflammatory medications without successful symptomatic resolution.  Operative and nonoperative options were discussed with him in my office and we ultimately agreed that surgery would provide the highest likelihood of symptomatic relief and functional improvement.  The risks and benefits of surgery as well as the postoperative rehabilitation plan were reviewed. He voiced a good understanding of treatment options, risks and benefits, and alternatives to surgery. He was given the opportunity to ask questions, which were all answered to the best of my ability and to his satisfaction. Devin wished to proceed.   On the day of surgery, the Devin was seen in the preoperative area.  I confirmed his identity by name and birthday. We reviewed and confirmed the surgical consent including laterality.  The surgical site was marked with my initials.  We re-reviewed the risks and benefits of the procedure and I answered all additional questions to his satisfaction.      OPERATIVE REPORT:   Devin was taken to the operating room in stable condition.    A clear 1010 drape x 2 was applied to the upper thigh. An adjustable lateral post was utilized. The extremity underwent a prescrub with chlorhexidine and alcohol followed by a chlorhexidine formal preparation.  A preoperative timeout was performed confirming the correct surgical site, procedure, and preoperative imaging was reviewed.      Exam under anesthesia demonstrated a Stable knee with normal range of motion.     Prior to initiating knee arthroscopy, the iliac crest was prepped and draped in sterile technique. Utilizing  a harvest trocar a piercing of the skin was made approximately 1 cm lateral to the anterior superior iliac spine. Approximately 60 cc of bone marrow was aspirated from the anterior superior iliac spine and sent to the centrifuge for bone marrow aspirate concentrate. Hemostasis was noted at the conclusion of removal of the trocar.     Diagnostic knee arthroscopy was performed with standard anterolateral visualization portal was made utilizing a 15 blade, and an arthroscope was introduced. The medial working portal was established under spinal needle localization and diagnostic arthroscopy was commenced.          Diagnostic arthroscopy revealed:      Suprapatellar No evidence of loose bodies   Patellofemoral Mild grade 2 articular cartilage wear overlying the patella diffusely.  Areas of full-thickness central trochlear articular cartilage wear.  Approximately 10 x 10 mm.   Gutters Clear without evidence of loose bodies or osteophytes.   Lateral compartment Grade 1 tibial cartilage  Grade 1 femoral cartilage  Lateral meniscus intact without tearing.   Medial compartment Grade 1-2 tibial cartilage  Grade 3-4 femoral cartilage   Evidence of near full-thickness articular cartilage wear on the weightbearing medial femoral condyle over an area of approximately 10 x 15 mm.  This was managed with abrasion plasty down to bleeding bone using curved mechanical 4.0 mm shaver.  It was confirmed to a stable peripheral margin.   Medial meniscus intact  No pathologic plica   Ligaments ACL Intact.   PCL intact  Popliteus intact    Other Mild to moderate synovitis involving the medial, lateral and patellofemoral compartments managed with synovectomy using curved mechanical 4.0 mm shaver and coablation device.     Within the medial compartment, the area of full-thickness articular cartilage wear with weightbearing medial femoral condyle was debrided systematically and managed with abrasion plasty down to bleeding bone with curved  mechanical 4.0 mm shaver.  Within the patellofemoral compartment, a 4.0 mm curved mechanical shaver was used to debride chondral surfaces of the patella and trochlea to stable margin.    Extensive synovectomy was performed in all 3 compartments with the aid of 4.0 mm curved mechanical shaver and Coblation to ensure hemostasis.  6 cc of bone marrow aspirate concentrate was injected into the knee at the conclusion of the procedure by Angiocath.  Adequate hemostasis were noted.  Wound closure was performed with buried 3-0 monocryl and overlying Dermabond and steri strips were applied.    At this point in time approximately 30 cc of Marcaine with epinephrine were utilized to provide local anesthesia.    4 x 4 and Tegaderm, David style dressing was applied.  The knee was wrapped in a 6 inch Ace wrap.   The final count prior to closure was correct. The patient tolerated the procedure well without complications.   Devin was taken to the recovery room in a stable condition with plan for ambulatory discharge to home. He was provided my postoperative discharge booklet, appropriate home exercises, script for outpatient physical therapy, and a copy of my rehabilitation guidelines.      POST OPERATIVE PLAN:  Activity Precautions: WBAT / ROMAT. To begin physical therapy ASAP.   DVT Prophylaxis: Not indicated as patient is ambulatory.   Follow-up Visit: POD #7-14        Jennifer Sandoval MD  Knee, Shoulder, & Elbow Surgery / Sports Medicine Specialist  Orthopaedic Surgery  [address] [place] [address].org  [email or web address]  t: [phone]  o: [phone]  f: [phone]    This note was dictated using Dragon software.  While it was briefly proofread prior to completion, some grammatical, spelling, and word choice errors due to dictation may still occur.

## 2024-08-13 NOTE — ANESTHESIA POSTPROCEDURE EVALUATION
[facility]    Devin Urrutia Patient Status:  Hospital Outpatient Surgery   Age/Gender 59 year old male MRN [id number]   Location [facility] SURGERY Attending Jennifer Sandoval MD   Hosp Day # 0 PCP Braxton Rivas MD       Anesthesia Post-op Note    RIGHT KNEE ARTHROSCOPY SYNOVECTOMY STEM CELLS FROM BONE MARROW ASPIRATION FROM RIGHT ILIAC CREST TO RIGHT KNEE; ABRASION PLASTY    Procedure Summary       Date: 08/13/24 Room / Location:  MAIN OR 14 / [facility] MAIN OR    Anesthesia Start: 1333 Anesthesia Stop: 1442    Procedure: RIGHT KNEE ARTHROSCOPY SYNOVECTOMY STEM CELLS FROM BONE MARROW ASPIRATION FROM RIGHT ILIAC CREST TO RIGHT KNEE; ABRASION PLASTY (Right: Knee) Diagnosis:       Chondromalacia of right knee      (Chondromalacia of right knee [M94.261])    Surgeons: Jennifer Sandoval MD Anesthesiologist: Sydney Slaughter MD    Anesthesia Type: general ASA Status: 2            Anesthesia Type: general    Vitals Value Taken Time   /103 08/13/24 1442   Temp 97.1 08/13/24 1442   Pulse 76 08/13/24 1442   Resp 12 08/13/24 1442   SpO2 96 08/13/24 1442       Patient Location: PACU    Anesthesia Type: general    Airway Patency: patent    Postop Pain Control: adequate    Mental Status: preanesthetic baseline    Nausea/Vomiting: none    Cardiopulmonary/Hydration status: stable euvolemic    Complications: no apparent anesthesia related complications    Postop vital signs: stable    Dental Exam: Unchanged from Preop    Patient to be discharged from PACU when criteria met.

## 2024-08-13 NOTE — ANESTHESIA PROCEDURE NOTES
Airway  Date/Time: 8/13/2024 1:37 PM  Urgency: elective      General Information and Staff    Patient location during procedure: OR  Anesthesiologist: Sydney Slaughter MD  Performed: anesthesiologist   Performed by: Sydney Slaughter MD  Authorized by: Sydney Slaughter MD      Indications and Patient Condition  Indications for airway management: anesthesia  Sedation level: deep  Preoxygenated: yes  Patient position: sniffing  Mask difficulty assessment: 1 - vent by mask    Final Airway Details  Final airway type: supraglottic airway      Successful airway: classic  Size 4       Number of attempts at approach: 1

## 2024-08-14 ENCOUNTER — TELEPHONE (OUTPATIENT)
Dept: PHYSICAL THERAPY | Facility: HOSPITAL | Age: 59
End: 2024-08-14

## 2024-08-15 ENCOUNTER — TELEPHONE (OUTPATIENT)
Dept: PHYSICAL THERAPY | Age: 59
End: 2024-08-15

## 2024-08-15 ENCOUNTER — LAB ENCOUNTER (OUTPATIENT)
Dept: LAB | Age: 59
End: 2024-08-15
Attending: INTERNAL MEDICINE
Payer: COMMERCIAL

## 2024-08-15 ENCOUNTER — OFFICE VISIT (OUTPATIENT)
Dept: PHYSICAL THERAPY | Age: 59
End: 2024-08-15
Attending: INTERNAL MEDICINE
Payer: COMMERCIAL

## 2024-08-15 DIAGNOSIS — M94.261 CHONDROMALACIA OF RIGHT KNEE: Primary | ICD-10-CM

## 2024-08-15 DIAGNOSIS — J45.50 SEVERE PERSISTENT ASTHMA WITHOUT COMPLICATION (HCC): ICD-10-CM

## 2024-08-15 DIAGNOSIS — M15.0 PRIMARY OSTEOARTHRITIS INVOLVING MULTIPLE JOINTS: ICD-10-CM

## 2024-08-15 LAB
BASOPHILS # BLD AUTO: 0.07 X10(3) UL (ref 0–0.2)
BASOPHILS NFR BLD AUTO: 0.9 %
CRP SERPL-MCNC: <0.4 MG/DL (ref ?–0.5)
EOSINOPHIL # BLD AUTO: 0.86 X10(3) UL (ref 0–0.7)
EOSINOPHIL NFR BLD AUTO: 10.9 %
ERYTHROCYTE [DISTWIDTH] IN BLOOD BY AUTOMATED COUNT: 13.5 %
ERYTHROCYTE [SEDIMENTATION RATE] IN BLOOD: 10 MM/HR
HCT VFR BLD AUTO: 38.4 %
HGB BLD-MCNC: 13.2 G/DL
IMM GRANULOCYTES # BLD AUTO: 0.02 X10(3) UL (ref 0–1)
IMM GRANULOCYTES NFR BLD: 0.3 %
LYMPHOCYTES # BLD AUTO: 3.77 X10(3) UL (ref 1–4)
LYMPHOCYTES NFR BLD AUTO: 47.9 %
MCH RBC QN AUTO: 30.3 PG (ref 26–34)
MCHC RBC AUTO-ENTMCNC: 34.4 G/DL (ref 31–37)
MCV RBC AUTO: 88.1 FL
MONOCYTES # BLD AUTO: 0.46 X10(3) UL (ref 0.1–1)
MONOCYTES NFR BLD AUTO: 5.8 %
NEUTROPHILS # BLD AUTO: 2.69 X10 (3) UL (ref 1.5–7.7)
NEUTROPHILS # BLD AUTO: 2.69 X10(3) UL (ref 1.5–7.7)
NEUTROPHILS NFR BLD AUTO: 34.2 %
PLATELET # BLD AUTO: 243 10(3)UL (ref 150–450)
RBC # BLD AUTO: 4.36 X10(6)UL
URATE SERPL-MCNC: 5.6 MG/DL
VIT B12 SERPL-MCNC: 1106 PG/ML (ref 211–911)
VIT D+METAB SERPL-MCNC: 33.1 NG/ML (ref 30–100)
WBC # BLD AUTO: 7.9 X10(3) UL (ref 4–11)

## 2024-08-15 PROCEDURE — 86140 C-REACTIVE PROTEIN: CPT

## 2024-08-15 PROCEDURE — 86235 NUCLEAR ANTIGEN ANTIBODY: CPT

## 2024-08-15 PROCEDURE — 86225 DNA ANTIBODY NATIVE: CPT

## 2024-08-15 PROCEDURE — 86003 ALLG SPEC IGE CRUDE XTRC EA: CPT

## 2024-08-15 PROCEDURE — 84207 ASSAY OF VITAMIN B-6: CPT

## 2024-08-15 PROCEDURE — 85652 RBC SED RATE AUTOMATED: CPT

## 2024-08-15 PROCEDURE — 84550 ASSAY OF BLOOD/URIC ACID: CPT

## 2024-08-15 PROCEDURE — 82607 VITAMIN B-12: CPT

## 2024-08-15 PROCEDURE — 82306 VITAMIN D 25 HYDROXY: CPT

## 2024-08-15 PROCEDURE — 85025 COMPLETE CBC W/AUTO DIFF WBC: CPT

## 2024-08-15 PROCEDURE — 82785 ASSAY OF IGE: CPT

## 2024-08-15 PROCEDURE — 36415 COLL VENOUS BLD VENIPUNCTURE: CPT

## 2024-08-15 PROCEDURE — 87522 HEPATITIS C REVRS TRNSCRPJ: CPT

## 2024-08-15 PROCEDURE — 97161 PT EVAL LOW COMPLEX 20 MIN: CPT

## 2024-08-15 PROCEDURE — 97110 THERAPEUTIC EXERCISES: CPT

## 2024-08-15 NOTE — PROGRESS NOTES
POST-OP KNEE EVALUATION:     Diagnosis:   Chondromalacia of right knee (M94.261)       Referring Provider: Jennifer Sandoval Date of Evaluation:    8/15/2024    Precautions:  None Next MD visit:   8/21/24  Date of Surgery: 8/13/24     PATIENT SUMMARY   Devin Urrutia is a 59 year old male who presents to therapy today s/p synovectomy, abrasionplasty, and stem cell from bone marrow aspiration on 8/13/24 with complaints of knee pain. He ambulates with single crutch on R. He states it is more comfortable ambulate with it especially when going up/down stairs. He states he had significant swelling in foot today when he woke up but has since gone down. Explains he took his medications this morning and is not in much pain.   Pt denies fever, signs of oozing/drainage from incision. Confirms he has used restroom since surgery and reports no changes in bowel/bladder.    Pt describes pain level current 2/10, at best 2/10, at worst 2/10.   Current functional limitations include walking, stairs, sitting (>1 hour), getting into/out of car, and performing household chores.     Devin describes prior level of function fully . Pt goals include \"to walk and be able to put my knees on the ground.\"  Past medical history was reviewed with Devin. Significant findings include   Past Medical History:    Allergic rhinitis    Asthma (HCC)    High cholesterol    Hyperlipidemia    Visual impairment       Past Surgical History:   Procedure Laterality Date    Arthroscopy of joint unlisted      Colonoscopy         Recent Imaging:  XR KNEE (1 OR 2 VIEWS), RIGHT (CPT=73560)    Result Date: 6/13/2024  CONCLUSION:  No evidence of acute displaced fracture or dislocation in the right knee.  LOCATION:  [facility]   Dictated by (CST): Ricardo Sarah MD on 6/13/2024 at 8:07 AM     Finalized by (CST): Ricardo Sarah MD on 6/13/2024 at 8:07 AM       XR KNEE (1 OR 2 VIEWS), LEFT (CPT=73560)    Result Date: 6/13/2024  CONCLUSION:  No evidence of acute displaced  fracture or dislocation in the left knee.  LOCATION:  [facility]   Dictated by (CST): Ricardo Sarah MD on 6/13/2024 at 8:07 AM     Finalized by (CST): Ricardo Sarah MD on 6/13/2024 at 8:07 AM       XR LUMBAR SPINE (MIN 4 VIEWS) (CPT=72110)    Result Date: 6/13/2024  CONCLUSION:  No acute fracture or subluxation in lumbar spine.  Degenerative changes as above.   LOCATION:  [facility]   Dictated by (CST): Ricardo Sarah MD on 6/13/2024 at 8:06 AM     Finalized by (CST): Ricardo Sarah MD on 6/13/2024 at 8:07 AM       XR PELVIS (COMPLETE MIN 3 VIEWS) (CPT=72190)    Result Date: 6/13/2024  CONCLUSION:  No evidence of acute displaced fracture or dislocation in the pelvis.  LOCATION:  [facility]   Dictated by (CST): Ricardo Sarah MD on 6/13/2024 at 8:05 AM     Finalized by (CST): Ricardo Sarah MD on 6/13/2024 at 8:06 AM       CHRIS Beltran presents to physical therapy evaluation with primary c/o R knee pain. The results of the objective tests and measures show decreased R knee ROM, decreased B hip flexion, abd, and ER, decreased R knee flexion and extension strength, increased R knee and foot girth, increased TUG time and decreased R SLS time.  Functional deficits include but are not limited to walking, stairs, sitting (>1 hour), getting into/out of car, and performing household chores.  Signs and symptoms are consistent with diagnosis of functional and mobility deficits secondary to chondromalacia of right knee s/p synovectomy, abrasionplasty, and stem cell from bone marrow aspiration. Pt and PT discussed evaluation findings, pathology, POC and HEP.  Pt voiced understanding and performs HEP correctly without reported pain. Skilled Physical Therapy is medically necessary to address the above impairments and reach functional goals.     OBJECTIVE:   Observation/Skin: FABIAN due to bandage   Palpation: Increased tissue tautness R quad; min TTP peripatellar spaces  Edema: at knee jt line R: 40 cm, L 36.5 cm  Figure 8 R: 56,  L: 54  Sensation: light touch intact and symmetrical BLE    AROM: (* denotes performed with pain)   Knee    Flexion: R 76; L 136   Extension: R -4; L 0     PROM: (* denotes performed with pain)   Knee    Flexion: R 94; L NT   Extension: R -3; L NT     Patellar Mobility/Accessory motion: No restrictions in patellar glides bilaterally    Strength/MMT: (* denotes performed with pain)  Hip Knee   Flexion: R 4-/5; L 4/5  Extension: R NT/5; L NT/5  Abduction: R 4/5; L 4/5  ER: R 3+/5; L 4/5 Flexion: R 3+/5; L 4+/5  Extension: R strong quad set; L 4+/5        Balance: SLS: R 1.5 sec, L 22 sec    Functional Mobility:  5x sit<>stand: 26.37 w/ BUE support  TUG (AD, time): 16.77 sec w/ no AD   Gait: pt ambulates on level ground with assistive device of single crutch, antalgia, decreased step length bilaterally, decreased stance phase on R, decreased hip/knee flex or ext bilaterally, decreased foot clearance on R, decreased arm swing, and stooped posture/forward lean.     Today’s Treatment and Response:   Pt education was provided on exam findings, treatment diagnosis, treatment plan, expectations, and prognosis. Pt was also provided recommendations for activity modifications, possible soreness after evaluation, modalities as needed [ice/heat], and importance of remaining active. Patient was instructed in and issued a HEP for:   Access Code: [email or web address]  URL: [email or web address]  Date: 08/15/2024  Prepared by: Randi Adam    Exercises  - Supine Ankle Pumps  - 1 x daily - 7 x weekly - 3 sets - 10 reps  - Supine Quad Set  - 1 x daily - 7 x weekly - 3 sets - 10 reps  - Supine Short Arc Quad  - 1 x daily - 7 x weekly - 3 sets - 10 reps  - Seated Knee Flexion AAROM  - 1 x daily - 7 x weekly - 3 sets - 10 reps - 3'' hold  - Supine Heel Slide with Strap  - 1 x daily - 7 x weekly - 3 sets - 10 reps - 3-5'' hold    Charges: PT Eval Low Complexity, 25 min      Total Timed Treatment: 23 min     Total Treatment Time: 48  min     2 TE (23 min): HEP demonstration and review; education on importance of staying active and walking for at least 10 minutes every hour; education on driving restrictions when taking narcotic pain medication; utilization of crutch on contralateral side for proper off-loading; instructions to wean off crutch as tolerated; pt verbalizing understanding     PLAN OF CARE:    Goals: (To be met in 12 visits)   Pt will improve knee extension ROM to 0 deg to allow proper heel strike during gait and terminal knee extension in stance  Pt will improve knee AROM flexion to >125 degrees to improve ability to perform stair negotiation  Pt will improve quad strength to 5/5 to ascend 1 flight of stairs reciprocally without UE assist  Pt will increase hip and knee strength to grossly 4+/5 to be able to get up and down from the floor safely  Pt will demonstrate increased hip ER/ABD strength to 4+/5 to perform stepping and squatting activities without excessive femoral IR/ADD  Pt will improve SLS to >30s to improve safety and independence with gait on uneven surfaces such as grass  Pt will be independent and compliant with comprehensive HEP to maintain progress achieved in PT    Frequency / Duration: Patient will be seen for 2 x/week or a total of 12 visits over a 90 day period.  Treatment will include: Gait training, Manual Therapy, Neuromuscular Re-education, Therapeutic Activities, Therapeutic Exercise, and Home Exercise Program instruction    Education or treatment limitation: None  Rehab Potential:good    LEFS Score  LEFS Score: 28.75 % (8/14/2024  3:25 PM)      Patient/Family/Caregiver was advised of these findings, precautions, and treatment options and has agreed to actively participate in planning and for this course of care.    Thank you for your referral. Please co-sign or sign and return this letter via fax as soon as possible to [phone]. If you have any questions, please contact me at Dept:  [phone]    Sincerely,  Electronically signed by therapist: Randi Adam PT  Physician's certification required: Yes  I certify the need for these services furnished under this plan of treatment and while under my care.    X___________________________________________________ Date____________________    Certification From: 8/15/2024  To:11/13/2024

## 2024-08-17 LAB
DSDNA IGG SERPL IA-ACNC: 0.9 IU/ML
ENA RNP IGG SER IA-ACNC: 0.4 U/ML
ENA SM IGG SER IA-ACNC: <0.7 U/ML
ENA SS-A IGG SER IA-ACNC: <0.4 U/ML
ENA SS-B IGG SER IA-ACNC: <0.4 U/ML
U1 SNRNP IGG SER IA-ACNC: 0.8 U/ML

## 2024-08-19 LAB
A ALTERNATA IGE QN: <0.1 KUA/L (ref ?–0.1)
A FUMIGATUS IGE QN: <0.1 KUA/L (ref ?–0.1)
ALLERGEN BRAZIL NUT: <0.1 KUA/L (ref ?–0.1)
ALMOND IGE QN: 0.26 KUA/L (ref ?–0.1)
AMER SYCAMORE IGE QN: 0.16 KUA/L (ref ?–0.1)
BERMUDA GRASS IGE QN: 2.72 KUA/L (ref ?–0.1)
BOXELDER IGE QN: 0.13 KUA/L (ref ?–0.1)
C HERBARUM IGE QN: <0.1 KUA/L (ref ?–0.1)
CALIF WALNUT IGE QN: 2.93 KUA/L (ref ?–0.1)
CASHEW NUT IGE QN: 0.2 KUA/L (ref ?–0.1)
CAT DANDER IGE QN: 10.7 KUA/L (ref ?–0.1)
CLAM IGE QN: <0.1 KUA/L (ref ?–0.1)
CMN PIGWEED IGE QN: <0.1 KUA/L (ref ?–0.1)
CODFISH IGE QN: <0.1 KUA/L (ref ?–0.1)
COMMON RAGWEED IGE QN: 0.11 KUA/L (ref ?–0.1)
CORN IGE QN: 1.27 KUA/L (ref ?–0.1)
COTTONWOOD IGE QN: 0.13 KUA/L (ref ?–0.1)
COW MILK IGE QN: 0.65 KUA/L (ref ?–0.1)
D FARINAE IGE QN: <0.1 KUA/L (ref ?–0.1)
D PTERONYSS IGE QN: <0.1 KUA/L (ref ?–0.1)
DOG DANDER IGE QN: 1.95 KUA/L (ref ?–0.1)
EGG WHITE IGE QN: 1.64 KUA/L (ref ?–0.1)
HAZELNUT IGE QN: 0.18 KUA/L (ref ?–0.1)
IGE SERPL-ACNC: 300 KU/L (ref 2–214)
IGE SERPL-ACNC: 326 KU/L (ref 2–214)
M RACEMOSUS IGE QN: <0.1 KUA/L (ref ?–0.1)
MARSH ELDER IGE QN: 0.23 KUA/L (ref ?–0.1)
MOUSE EPITH IGE QN: <0.1 KUA/L (ref ?–0.1)
MT JUNIPER IGE QN: 0.19 KUA/L (ref ?–0.1)
P NOTATUM IGE QN: <0.1 KUA/L (ref ?–0.1)
PEANUT IGE QN: 0.71 KUA/L (ref ?–0.1)
PECAN/HICK TREE IGE QN: 1.58 KUA/L (ref ?–0.1)
ROACH IGE QN: 0.19 KUA/L (ref ?–0.1)
SALMON IGE QN: <0.1 KUA/L (ref ?–0.1)
SALTWORT IGE QN: <0.1 KUA/L (ref ?–0.1)
SCALLOP IGE QN: <0.1 KUA/L (ref ?–0.1)
SESAME SEED IGE QN: 1.01 KUA/L (ref ?–0.1)
SHRIMP IGE QN: <0.1 KUA/L (ref ?–0.1)
SILVER BIRCH IGE QN: 0.14 KUA/L (ref ?–0.1)
SOYBEAN IGE QN: 0.25 KUA/L (ref ?–0.1)
TIMOTHY IGE QN: 11.2 KUA/L (ref ?–0.1)
WALNUT IGE QN: 0.37 KUA/L (ref ?–0.1)
WHEAT IGE QN: 0.95 KUA/L (ref ?–0.1)
WHITE ASH IGE QN: 1.99 KUA/L (ref ?–0.1)
WHITE ELM IGE QN: 0.35 KUA/L (ref ?–0.1)
WHITE MULBERRY IGE QN: <0.1 KUA/L (ref ?–0.1)
WHITE OAK IGE QN: 0.15 KUA/L (ref ?–0.1)

## 2024-08-20 ENCOUNTER — OFFICE VISIT (OUTPATIENT)
Dept: PHYSICAL THERAPY | Age: 59
End: 2024-08-20
Attending: INTERNAL MEDICINE
Payer: COMMERCIAL

## 2024-08-20 ENCOUNTER — TELEPHONE (OUTPATIENT)
Dept: PHYSICAL THERAPY | Facility: HOSPITAL | Age: 59
End: 2024-08-20

## 2024-08-20 PROCEDURE — 97110 THERAPEUTIC EXERCISES: CPT

## 2024-08-20 NOTE — PROGRESS NOTES
Diagnosis:   Chondromalacia of right knee (M94.261)          Referring Provider: Morena Olguin  Date of Evaluation:    8/15/24    Precautions:  None Next MD visit:   none scheduled  Date of Surgery: n/a   Insurance Primary/Secondary: [employer] / N/A     # Auth Visits: 5            Subjective:   Pt reports he is doing HEP each night. He states he continues to feel pain, especially when negotiating stairs. Pain in intermittently in B knees. Denies swelling.    Pain: 3/10      Objective:   R Knee Flexion ROM:  97 degrees (76 at IE)    Assessment:   Pt returns to clinic for first treatment session following initial evaluation. Supervised Nustep initiated to promote AAROM knee flexion. Remainder of session focused on knee mobility and strengthening. Unable to reach full extension with LAQ against gravity. Mat height raised during STS secondary to decreased eccentric quad control upon descent. HEP updated to include LAQ, knee ext stretch, and STS. Pt should continue skilled physical therapy to address persisting functional limitations including walking, stairs, sitting (>1 hour), getting into/out of car, and performing household chores.    Goals:   (To be met in 12 visits)   Pt will improve knee extension ROM to 0 deg to allow proper heel strike during gait and terminal knee extension in stance  Pt will improve knee AROM flexion to >125 degrees to improve ability to perform stair negotiation  Pt will improve quad strength to 5/5 to ascend 1 flight of stairs reciprocally without UE assist  Pt will increase hip and knee strength to grossly 4+/5 to be able to get up and down from the floor safely  Pt will demonstrate increased hip ER/ABD strength to 4+/5 to perform stepping and squatting activities without excessive femoral IR/ADD  Pt will improve SLS to >30s to improve safety and independence with gait on uneven surfaces such as grass  Pt will be independent and compliant with comprehensive HEP to maintain progress  achieved in PT       Plan: Continue w/ POC; see intervention grid for progressions   Date: 8/20/2024  TX#: 2/12  5 auth Date:                 TX#: 3/ Date:                 TX#: 4/ Date:                 TX#: 5/ Date:   Tx#: 6/   Therex (40 min)       Nustep   5'   Level 3       LAQ w/ 5'' Hold  20 x R  20 x w/ 1.5# AW L       Supine Hamstring Stretch   3 x 30''       Standing Calf Stretch   3 x 30''       Knee Ext stretch on stool  20x w/ 3'' hold        STS  10x       Step ups  4'' step  RLE  15 x        Clamshells   20 x B       Calf Raises   W/ BUE support  20 x               Next Visit  Standing Abd  Squats   Leg Press        HEP:   Access Code: [email or web address]  URL: [email or web address]  Date: 08/20/2024  Prepared by: Randi Adam    Exercises  - Supine Short Arc Quad  - 1 x daily - 7 x weekly - 3 sets - 10 reps  - Seated Knee Flexion AAROM  - 1 x daily - 7 x weekly - 3 sets - 10 reps - 3'' hold  - Supine Heel Slide with Strap  - 1 x daily - 7 x weekly - 3 sets - 10 reps - 3-5'' hold  - Sit to Stand Without Arm Support  - 1 x daily - 7 x weekly - 2 sets - 10 reps  - Seated Long Arc Quad  - 1 x daily - 7 x weekly - 3 sets - 10 reps - 5'' hold  - Seated Knee Extension Stretch with Chair  - 1 x daily - 7 x weekly - 3 sets - 10 reps - 3'' hold    Charges: 3 TE       Total Timed Treatment: 40 min  Total Treatment Time: 40 min

## 2024-08-21 ENCOUNTER — TELEPHONE (OUTPATIENT)
Dept: PHYSICAL THERAPY | Facility: HOSPITAL | Age: 59
End: 2024-08-21

## 2024-08-21 ENCOUNTER — OFFICE VISIT (OUTPATIENT)
Dept: ORTHOPEDICS CLINIC | Facility: CLINIC | Age: 59
End: 2024-08-21
Payer: COMMERCIAL

## 2024-08-21 ENCOUNTER — OFFICE VISIT (OUTPATIENT)
Dept: SLEEP CENTER | Age: 59
End: 2024-08-21
Attending: INTERNAL MEDICINE
Payer: COMMERCIAL

## 2024-08-21 DIAGNOSIS — Z98.890 S/P ARTHROSCOPY OF KNEE: Primary | ICD-10-CM

## 2024-08-21 DIAGNOSIS — R35.1 NOCTURIA: ICD-10-CM

## 2024-08-21 PROCEDURE — 95806 SLEEP STUDY UNATT&RESP EFFT: CPT

## 2024-08-21 PROCEDURE — 99024 POSTOP FOLLOW-UP VISIT: CPT | Performed by: PHYSICIAN ASSISTANT

## 2024-08-21 RX ORDER — MELOXICAM 15 MG/1
15 TABLET ORAL DAILY
Qty: 30 TABLET | Refills: 0 | Status: SHIPPED | OUTPATIENT
Start: 2024-08-21 | End: 2024-09-20

## 2024-08-21 NOTE — PROGRESS NOTES
[facility]THOPED[facility]  [address] [place] [id number]  [phone]       Name: Devin Urrutia   MRN: [id number]  Date: 8/21/2024     REASON FOR VISIT: First Post-Surgical Visit   Surgery: Right knee scope, abrasionaplasty extensive debridement on 08/13/2024.     INTERVAL HISTORY:  Devin Urrutia is a 59 year old male who returns after the aforementioned procedure.  The post-operative course has been unremarkable with pain well controlled and overall progress noted.     Physical therapy was started and is progressing well.  The patient denies any calf pain or tenderness, fevers, chills, sweats or signs of active infection. The patient has been compliant with the postoperative protocol, and admits to normal bowel and bladder function. No acute issues.     ROS: ROS    PE:   There were no vitals filed for this visit.  Estimated body mass index is 25.94 kg/m² as calculated from the following:    Height as of 8/6/24: 5' 11\" (1.803 m).    Weight as of 8/13/24: 186 lb (84.4 kg).    Physical Exam  Constitutional:       Appearance: Normal appearance.   HENT:      Head: Normocephalic and atraumatic.   Eyes:      Extraocular Movements: Extraocular movements intact.   Neck:      Musculoskeletal: Normal range of motion and neck supple.   Cardiovascular:      Pulses: Normal pulses.   Pulmonary:      Effort: Pulmonary effort is normal. No respiratory distress.   Abdominal:      General: There is no distension.   Skin:     General: Skin is warm.      Capillary Refill: Capillary refill takes less than 2 seconds.      Findings: No bruising.   Neurological:      General: No focal deficit present.      Mental Status: She is alert.   Psychiatric:         Mood and Affect: Mood normal.     Examination of the right knee demonstrates:     Physical examination the patient is alert and oriented x3, well-developed, well-nourished, no acute distress.     Tegaderm dressings were removed, and Steri-Strips were  maintained and kept in place.    Incisional sites are clean dry intact without signs of active pathology.  Calf is soft and nontender to palpation.    The contralateral knee is without limitation in range of motion or strength, no positive provocative maneuvers.       IMPRESSION: Devin Urrutia is a 59 year old male who presents 8 days s/p Right knee scope, abrasionaplasty extensive debridement on 08/13/2024.     PLAN:   We had a lengthy discussion with the patient regarding the patient's findings consistent with the expected postoperative course. We recommend continuation of physical therapy with rehabilitation efforts focused on strengthening, range of motion, functional ability, and return to baseline activity. The patient can continue to progress per protocol.    All questions were answered appropriately and the patient was in agreement with the treatment plan.       FOLLOW-UP:  2-3 months, no imaging needed.             Cherry Belle, [facility], PA-C Orthopedic Surgery / Sports Medicine Specialist  [facility]  [address] [place] [address].org  [email or web address]  t: [phone]  o: [phone]  f: [phone]    This note was dictated using Dragon software.  While it was briefly proofread prior to completion, some grammatical, spelling, and word choice errors due to dictation may still occur.

## 2024-08-22 ENCOUNTER — APPOINTMENT (OUTPATIENT)
Dept: PHYSICAL THERAPY | Age: 59
End: 2024-08-22
Attending: INTERNAL MEDICINE
Payer: COMMERCIAL

## 2024-08-22 ENCOUNTER — SLEEP STUDY (OUTPATIENT)
Facility: CLINIC | Age: 59
End: 2024-08-22
Payer: COMMERCIAL

## 2024-08-22 DIAGNOSIS — G47.30 SLEEP APNEA, UNSPECIFIED TYPE: Primary | ICD-10-CM

## 2024-08-22 PROCEDURE — 95806 SLEEP STUDY UNATT&RESP EFFT: CPT | Performed by: INTERNAL MEDICINE

## 2024-08-23 LAB — VITAMIN B6: 19.7 UG/L

## 2024-08-26 ENCOUNTER — TELEPHONE (OUTPATIENT)
Dept: PHYSICAL THERAPY | Facility: HOSPITAL | Age: 59
End: 2024-08-26

## 2024-08-28 ENCOUNTER — APPOINTMENT (OUTPATIENT)
Dept: PHYSICAL THERAPY | Age: 59
End: 2024-08-28
Attending: INTERNAL MEDICINE
Payer: COMMERCIAL

## 2024-09-03 ENCOUNTER — OFFICE VISIT (OUTPATIENT)
Dept: SURGERY | Facility: CLINIC | Age: 59
End: 2024-09-03
Payer: COMMERCIAL

## 2024-09-03 DIAGNOSIS — N13.8 BPH WITH OBSTRUCTION/LOWER URINARY TRACT SYMPTOMS: Primary | ICD-10-CM

## 2024-09-03 DIAGNOSIS — N40.1 BPH WITH OBSTRUCTION/LOWER URINARY TRACT SYMPTOMS: Primary | ICD-10-CM

## 2024-09-03 LAB
APPEARANCE: CLEAR
GLUCOSE (URINE DIPSTICK): NEGATIVE MG/DL
KETONES (URINE DIPSTICK): NEGATIVE MG/DL
LEUKOCYTES: NEGATIVE
MULTISTIX LOT#: ABNORMAL NUMERIC
NITRITE, URINE: NEGATIVE
OCCULT BLOOD: NEGATIVE
PH, URINE: 5.5 (ref 4.5–8)
SPECIFIC GRAVITY: >=1.03 (ref 1–1.03)
URINE-COLOR: YELLOW
UROBILINOGEN,SEMI-QN: 0.2 MG/DL (ref 0–1.9)

## 2024-09-03 PROCEDURE — 81003 URINALYSIS AUTO W/O SCOPE: CPT | Performed by: SURGERY

## 2024-09-03 PROCEDURE — 99214 OFFICE O/P EST MOD 30 MIN: CPT | Performed by: SURGERY

## 2024-09-03 RX ORDER — ALFUZOSIN HYDROCHLORIDE 10 MG/1
10 TABLET, EXTENDED RELEASE ORAL DAILY
Qty: 90 TABLET | Refills: 5 | Status: SHIPPED | OUTPATIENT
Start: 2024-09-03

## 2024-09-03 NOTE — PROGRESS NOTES
Urology Clinic Note    Primary Care Provider:  Braxton Rivas MD     Chief Complaint:   BPH/LUTS    HPI:   Devin Urrutia is a 59 year old male with history of asthma, hyperlipidemia referred for lower urinary tract symptoms.     I saw him for initial visit on 12/7/2023.  At that time he endorsed nocturia hourly, and he endorsed drinking water when waking up at night due to dry mouth.  He also endorsed slow urinary stream and urinary hesitancy.  His symptoms are more bothersome at night.  2 cups of tea in the morning only.  NICOLE with 45 g benign prostate.  I started him on tamsulosin 0.4 mg nightly.  He experienced dizziness and fatigue so we will switch to silodosin.     He was unable to get silodosin due to insurance.  He continues to endorse bothersome nocturia every hour.  He does wake up with dry mouth so drinks water throughout the night.  He denies urinary urgency or frequency during the daytime.  He occasionally has weak urinary stream but this is not his main problem.  He is unsure if he snores, and given significant nocturia with no daytime symptoms as well as dry mouth I recommend sleep study.  I will also trial alfuzosin to see if insurance will cover this and if this would not cause dizziness and other side effects.     He started alfuzosin and finds no significant improvement in his urinary symptoms.  He continues to have bothersome nocturia with large volumes at night 5 times per night.  He denies urgency or frequency during the daytime.  He does drink a lot of fluids before and during the night.  He denies weak urinary stream.  Occasional urinary hesitancy.  He is scheduled for sleep study in August.    Last visit 7/2/2024 I discussed okay to stop alfuzosin and discussed decreasing fluid before bedtime and offered sleep study.  Sleep study 8/21/2024 showed no signs of sleep disordered breathing.    Urinary symptoms today are stable.  He denies bothersome weak urinary stream.  He remains on alfuzosin.   He denies daytime urgency/frequency.  He continues to have nocturia 4-5 times per night.  He does drink a lot of fluids still before bedtime and during the night.  He is relatively satisfied with his voiding symptoms at this time.     His most recent PSA on 2023 was 2.46.    AUA symptom score is 24/3 with LUTS.    Post-void residual bladder scan: 0 mL    Urinalysis: Negative    PSA:  Lab Results   Component Value Date    PSAS 2.46 2023    PSAS 1.62 2019        History:     Past Medical History:    Allergic rhinitis    Asthma (HCC)    High cholesterol    Hyperlipidemia    Visual impairment       Past Surgical History:   Procedure Laterality Date    Arthroscopy of joint unlisted      Colonoscopy         Family History   Problem Relation Age of Onset    Heart Disorder Father     Hypertension Mother     Heart Disorder Mother     Asthma Mother        Social History     Socioeconomic History    Marital status:    Tobacco Use    Smoking status: Former     Current packs/day: 0.00     Average packs/day: 0.5 packs/day for 25.0 years (12.5 ttl pk-yrs)     Types: Cigarettes     Start date:      Quit date:      Years since quittin.6     Passive exposure: Past    Smokeless tobacco: Former    Tobacco comments:     N   Vaping Use    Vaping status: Never Used   Substance and Sexual Activity    Alcohol use: Never    Drug use: Never   Other Topics Concern    Caffeine Concern Yes     Comment: occassionally    Exercise Yes     Comment: every other day     Social Determinants of Health      [facility]    Social Connections    [facility]    Housing Stability       Medications (Active prior to today's visit):  Current Outpatient Medications   Medication Sig Dispense Refill    Meloxicam 15 MG Oral Tab Take 1 tablet (15 mg total) by mouth daily. Take 1 tablet by mouth daily  for 30 days with food. 30 tablet 0    traMADol 50 MG Oral Tab Please take 1 tablet every 6 hours as needed for pain. Max of 8 tablets per day. Collaborating - Dr. Jennifer Sandoval. 20 tablet 1    Sennosides-Docusate Sodium 8.6-50 MG Oral Cap Take 1 capsule by mouth daily as needed. 30 capsule 1    ondansetron (ZOFRAN) 4 mg tablet Take 1 tablet by mouth every 8 hours as needed for nausea. 8 tablet 0    Meloxicam 15 MG Oral Tab Take 1 tablet (15 mg total) by mouth daily. 14 tablet 1    fluticasone-umeclidin-vilant (TRELEGY ELLIPTA) 200-62.5-25 MCG/ACT Inhalation Aerosol Powder, Breath Activated Inhale 1 puff into the lungs daily. 60 each 5    budesonide 0.5 MG/2ML Inhalation Suspension Take 2 mL (0.5 mg total) by nebulization daily. 120 mL 5    atorvastatin 10 MG Oral Tab Take 1 tablet (10 mg total) by mouth nightly. 90 tablet 1    albuterol 108 (90 Base) MCG/ACT Inhalation Aero Soln Inhale 1 puff into the lungs every 6 (six) hours as needed. 1 each 1    gabapentin 100 MG Oral Cap Take 3 capsules (300 mg total) by mouth nightly. 100mg at bedtime x 1 week then increase to 200mg at bedtime x 1 week then 300mg at bedtime 90 capsule 2    alfuzosin ER 10 MG Oral Tablet 24 Hr Take 1 tablet (10 mg total) by mouth daily. (Patient taking differently: Take 1 tablet (10 mg total) by mouth daily.) 90 tablet 5    cetirizine 5 MG Oral Tab Take 1 tablet (5 mg total) by mouth daily.      FLOVENT  MCG/ACT Inhalation Aerosol Inhale 2 puffs into the lungs 2 (two) times daily. 1 each 3    montelukast 10 MG Oral Tab Take 1 tablet (10 mg total) by mouth nightly. 90 tablet 1    Respiratory Therapy Supplies (NEBULIZER) Does not apply Device Please use every 6 hour as needed for Shortness of breath, wheezing 1 Device 0       Allergies:  No Known Allergies    Review of Systems:   A comprehensive 10-point review of systems was completed.  Pertinent positives and negatives are noted in the the HPI.    Physical Exam:   CONSTITUTIONAL: Well  developed, well nourished, in no acute distress  NEUROLOGIC: Alert and oriented  HEAD: Normocephalic, atraumatic  EYES: Sclera non-icteric  ENT: Hearing intact, moist mucous membranes  NECK: No obvious goiter or masses  RESPIRATORY: Normal respiratory effort  SKIN: No evident rashes  ABDOMEN: Soft, non-tender, non-distended    Assessment & Plan:   Devin Urrutia is a 59 year old male with history of asthma, hyperlipidemia referred for lower urinary tract symptoms.     I saw him for initial visit on 12/7/2023.  At that time he endorsed nocturia hourly, and he endorsed drinking water when waking up at night due to dry mouth.  He also endorsed slow urinary stream and urinary hesitancy.  His symptoms are more bothersome at night.  2 cups of tea in the morning only.  NICOLE with 45 g benign prostate.  I started him on tamsulosin 0.4 mg nightly.  He experienced dizziness and fatigue so we will switch to silodosin.     He was unable to get silodosin due to insurance.  He continues to endorse bothersome nocturia every hour.  He does wake up with dry mouth so drinks water throughout the night.  He denies urinary urgency or frequency during the daytime.  He occasionally has weak urinary stream but this is not his main problem.  He is unsure if he snores, and given significant nocturia with no daytime symptoms as well as dry mouth I recommend sleep study.  I will also trial alfuzosin to see if insurance will cover this and if this would not cause dizziness and other side effects.     He started alfuzosin and finds no significant improvement in his urinary symptoms.  He continues to have bothersome nocturia with large volumes at night 5 times per night.  He denies urgency or frequency during the daytime.  He does drink a lot of fluids before and during the night.  He denies weak urinary stream.  Occasional urinary hesitancy.  He is scheduled for sleep study in August.    Last visit 7/2/2024 I discussed okay to stop alfuzosin and  discussed decreasing fluid before bedtime and offered sleep study.  Sleep study 8/21/2024 showed no signs of sleep disordered breathing.    Urinary symptoms today are stable.  He denies bothersome weak urinary stream.  He remains on alfuzosin.  He denies daytime urgency/frequency.  He continues to have nocturia 4-5 times per night.  He does drink a lot of fluids still before bedtime and during the night.  He is relatively satisfied with his voiding symptoms at this time.     His most recent PSA on 2/7/2023 was 2.46.    -Continue alfuzosin  -Decrease fluids before bedtime and at night  -Return to clinic in 1 year or sooner as needed    In total, 30 minutes were spent on this patient encounter (including chart review, patient history, physical, and counseling, documentation, and communication).    Julio Birmingham MD  Staff Urologist  [facility]  Office: [phone]

## 2024-09-04 ENCOUNTER — OFFICE VISIT (OUTPATIENT)
Dept: PHYSICAL THERAPY | Age: 59
End: 2024-09-04
Attending: INTERNAL MEDICINE
Payer: COMMERCIAL

## 2024-09-04 PROCEDURE — 97110 THERAPEUTIC EXERCISES: CPT

## 2024-09-04 NOTE — PROGRESS NOTES
Diagnosis:   Chondromalacia of right knee (M94.261)          Referring Provider: Morena Olguin  Date of Evaluation:    8/15/24    Precautions:  None Next MD visit:   none scheduled  Date of Surgery: n/a   Insurance Primary/Secondary: [employer] / N/A     # Auth Visits: 5            Subjective:   \" My right knee feels better since the start of PT and I have less pain with ambulation \".    Pain: 2/10      Objective:   R Knee Flexion ROM:  97 degrees (76 at IE)    ( 9/04/2024 ) AROM R knee flexion 123 deg / ext -1 deg ,    Assessment:   Pt demonstrates improved AROM of right knee since initial evaluation .    Goals:   (To be met in 12 visits)   Pt will improve knee extension ROM to 0 deg to allow proper heel strike during gait and terminal knee extension in stance  Pt will improve knee AROM flexion to >125 degrees to improve ability to perform stair negotiation  Pt will improve quad strength to 5/5 to ascend 1 flight of stairs reciprocally without UE assist  Pt will increase hip and knee strength to grossly 4+/5 to be able to get up and down from the floor safely  Pt will demonstrate increased hip ER/ABD strength to 4+/5 to perform stepping and squatting activities without excessive femoral IR/ADD  Pt will improve SLS to >30s to improve safety and independence with gait on uneven surfaces such as grass  Pt will be independent and compliant with comprehensive HEP to maintain progress achieved in PT       Plan: Continue w/ POC; see intervention grid for progressions   Date: 8/20/2024  TX#: 2/12  5 auth Date:  9/04/24               TX#: 3/12   Date:                 TX#: 4/ Date:                 TX#: 5/  Progress note  Date:   Tx#: 6/   Therex (40 min) Therex ( 40 min )      Nustep   5'   Level 3 NU step x 6 min , level 4      LAQ w/ 5'' Hold  20 x R  20 x w/ 1.5# AW L Supine :  Patella mobs in all planes   PROM R knee flex / ext with gentle end range stretch x 5 min       Supine Hamstring Stretch   3 x 30'' Prone :  R  knee flexion self stretch 10 x 10 sec hold   Long sit :  R knee extension stretch with heel on yoga block 10 x 10 sec hold   B QS 2 x 10  Seated :  R LAQ 2 x 10       Standing Calf Stretch   3 x 30'' Standing :  B gastroc stretch on slant board x 5 with 20 sec hold  B heels raises x 20   B toes raises x 20      Knee Ext stretch on stool  20x w/ 3'' hold  Standing :  R TKE on wall against yellow ball 2 x 10       STS  10x Step up / lateral step on 4 inch step 2 x 10 each       Step ups  4'' step  RLE  15 x  Shuttle :  B leg press with 50 lbs   2 x 10   R leg press with 25 lbs 2 x 10   Sidestepping in // bars with RTB 2 rounds       Clamshells   20 x B       Calf Raises   W/ BUE support  20 x               Next Visit  Standing Abd  Squats   Leg Press        HEP:   Access Code: [email or web address]  URL: [email or web address]  Date: 08/20/2024  Prepared by: Randi Adam    Exercises  - Supine Short Arc Quad  - 1 x daily - 7 x weekly - 3 sets - 10 reps  - Seated Knee Flexion AAROM  - 1 x daily - 7 x weekly - 3 sets - 10 reps - 3'' hold  - Supine Heel Slide with Strap  - 1 x daily - 7 x weekly - 3 sets - 10 reps - 3-5'' hold  - Sit to Stand Without Arm Support  - 1 x daily - 7 x weekly - 2 sets - 10 reps  - Seated Long Arc Quad  - 1 x daily - 7 x weekly - 3 sets - 10 reps - 5'' hold  - Seated Knee Extension Stretch with Chair  - 1 x daily - 7 x weekly - 3 sets - 10 reps - 3'' hold    Charges: 3 TE       Total Timed Treatment: 40 min  Total Treatment Time: 40 min

## 2024-09-09 ENCOUNTER — OFFICE VISIT (OUTPATIENT)
Dept: PHYSICAL THERAPY | Age: 59
End: 2024-09-09
Attending: INTERNAL MEDICINE
Payer: COMMERCIAL

## 2024-09-09 PROCEDURE — 97110 THERAPEUTIC EXERCISES: CPT

## 2024-09-09 NOTE — PROGRESS NOTES
Diagnosis:   Chondromalacia of right knee (M94.261)          Referring Provider: Morena Olguin  Date of Evaluation:    8/15/24    Precautions:  None Next MD visit:   none scheduled  Date of Surgery: n/a   Insurance Primary/Secondary: [employer] / N/A     # Auth Visits: 5            Subjective:   \" My right knee feels better and I have less pain 1/10 in my knee with walking and with standing '.    Pain: 1/10      Objective:   R Knee Flexion ROM:  97 degrees (76 at IE)    ( 9/04/2024 ) AROM R knee flexion 123 deg / ext -1 deg ,    Assessment:   Added hip strength ex's in WB to further progress trunk  and lower extremities stability with gait and with stairs negotiation .Patient demo significant mm fatigue with completion strength ex's . Patient required mod verbal and manual cueing during hip strength ex's performance to ensure proper mm recruitment .    Goals:   (To be met in 12 visits)   Pt will improve knee extension ROM to 0 deg to allow proper heel strike during gait and terminal knee extension in stance  Pt will improve knee AROM flexion to >125 degrees to improve ability to perform stair negotiation  Pt will improve quad strength to 5/5 to ascend 1 flight of stairs reciprocally without UE assist  Pt will increase hip and knee strength to grossly 4+/5 to be able to get up and down from the floor safely  Pt will demonstrate increased hip ER/ABD strength to 4+/5 to perform stepping and squatting activities without excessive femoral IR/ADD  Pt will improve SLS to >30s to improve safety and independence with gait on uneven surfaces such as grass  Pt will be independent and compliant with comprehensive HEP to maintain progress achieved in PT       Plan: Continue w/ POC; see intervention grid for progressions   Date: 8/20/2024  TX#: 2/12  5 auth Date:  9/04/24               TX#: 3/12   Date:  9/09/24               TX#: 4/12 Date:                 TX#: 5/  Progress note  Date:   Tx#: 6/   Therex (40 min) Therex ( 40  min ) Therex ( 40 min )     Nustep   5'   Level 3 NU step x 6 min , level 4 Nu step x 6 min , level 5     LAQ w/ 5'' Hold  20 x R  20 x w/ 1.5# AW L Supine :  Patella mobs in all planes   PROM R knee flex / ext with gentle end range stretch x 5 min  Supine :  R knee patella mobs in all planes   PROM R knee flex / ext with gentle end range stretch x 5 min      Supine Hamstring Stretch   3 x 30'' Prone :  R knee flexion self stretch 10 x 10 sec hold   Long sit :  R knee extension stretch with heel on yoga block 10 x 10 sec hold   B QS 2 x 10  Seated :  R LAQ 2 x 10  Supine :  R HS stretch with belt assist 10 x 10 sec hold   R knee AAROM of knee flex / ext with gentle end range stretch with heel on SB   2 x 10      Standing Calf Stretch   3 x 30'' Standing :  B gastroc stretch on slant board x 5 with 20 sec hold  B heels raises x 20   B toes raises x 20 Standing :  B gastroc stretch on slant board  5 x 20 sec hold   B heel raises x 20  B toes raises x 20     Knee Ext stretch on stool  20x w/ 3'' hold  Standing :  R TKE on wall against yellow ball 2 x 10  Standing :  TKE with RTB 2 x 10      STS  10x Step up / lateral step on 4 inch step 2 x 10 each  Step up / lateral step up on 6 inch step   2 x 10   Step down on 4 inch step 2 x 10     Step ups  4'' step  RLE  15 x  Shuttle :  B leg press with 50 lbs   2 x 10   R leg press with 25 lbs 2 x 10   Sidestepping in // bars with RTB 2 rounds  Shuttle :  B leg press with 62 lbs 2 x 10   R leg press with 25 lbs   3 x 10     Clamshells   20 x B  Standing :  Alt hip abd with RTB 2 x 10   Alt hip ext with RTB 2 x 10   Side stepping with RTB 2 rounds      Calf Raises   W/ BUE support  20 x               Next Visit  Standing Abd  Squats   Leg Press        HEP:   Access Code: [email or web address]  URL: [email or web address]  Date: 08/20/2024  Prepared by: Randi Adam    Exercises  - Supine Short Arc Quad  - 1 x daily - 7 x weekly - 3 sets - 10 reps  - Seated Knee Flexion  AAROM  - 1 x daily - 7 x weekly - 3 sets - 10 reps - 3'' hold  - Supine Heel Slide with Strap  - 1 x daily - 7 x weekly - 3 sets - 10 reps - 3-5'' hold  - Sit to Stand Without Arm Support  - 1 x daily - 7 x weekly - 2 sets - 10 reps  - Seated Long Arc Quad  - 1 x daily - 7 x weekly - 3 sets - 10 reps - 5'' hold  - Seated Knee Extension Stretch with Chair  - 1 x daily - 7 x weekly - 3 sets - 10 reps - 3'' hold    Charges: 3 TE       Total Timed Treatment: 40 min  Total Treatment Time: 40 min

## 2024-09-11 NOTE — PROGRESS NOTES
Diagnosis:   Chondromalacia of right knee (M94.261)          Referring Provider: Jennifer Sandoval Date of Evaluation:    8/15/24    Precautions:  None Next MD visit:   none scheduled  Date of Surgery: n/a   Insurance Primary/Secondary: [employer] / N/A     # Auth Visits: 5           Progress Summary  Pt has attended 5 visits in Physical Therapy.     Subjective:   Pt states his knee is doing much better. He is having some pain in R shoulder which he thinks may be due to computer work.     Pain: 2/10      Objective:   Edema: at knee jt line R: 40 cm, L 36.5 cm  Figure 8 R: 56, L: 54  Sensation: light touch intact and symmetrical BLE     AROM: (* denotes performed with pain)   Knee at IE     Flexion: R 76; L 136   Extension: R -4; L 0 Flexion: R 131   Extension: R -2      PROM: (* denotes performed with pain)   Knee at IE     Flexion: R 94; L NT   Extension: R -3; L NT Flexion: R ; 132   Extension: R 0      Patellar Mobility/Accessory motion: No restrictions in patellar glides bilaterally     Strength/MMT: (* denotes performed with pain)  Hip at IE 9/12/2024 Knee at IE 9/12/2024   Flexion: R 4-/5; L 4/5  Extension: R NT/5; L NT/5  Abduction: R 4/5; L 4/5  ER: R 3+/5; L 4/5 Flexion: R 4/5; L 4/5  Extension: R 4/5; L 4/5  Abduction: R 4/5; L 4/5  ER: R 4/5; L 4/5 Flexion: R 3+/5; L 4+/5  Extension: R strong quad set; L 4+/5    Flexion: R 5/5; L 4+/5  Extension: R 5/5; L 4+/5      Balance: SLS: R 1.5 sec, L 22 sec at IE   9/12/24: R: 13 sec; L 19.5 sec     Functional Mobility:  At IE 9/12/2024   5x sit<>stand: 26.37 w/ BUE support  TUG (AD, time): 16.77 sec w/ no AD     5x sit<>stand: 18.39 w/ BUE support  TUG (AD, time): 7.97 sec w/ no AD        Assessment:   Progress note complete. Pt meeting 2/7 goals this date. R knee ROM and strength improving. Pt notes improvement in functional limitations including walking, household chores, prolonged sitting, and getting into and out of car. He continues to be limited in negotiating  many flights of stairs at a time. TUG time improved to < 12 seconds indicating good functional mobility. Also demonstrating clinically significant improvement in 5x STS time. Pt challenged with increased overload with single leg shuttle press this date. Pt should continue skilled PT to address persisting functional limitations and facilitate return to PLOF.     Goals:   (To be met in 12 visits)   Pt will improve knee extension ROM to 0 deg to allow proper heel strike during gait and terminal knee extension in stance - Progressing  Pt will improve knee AROM flexion to >125 degrees to improve ability to perform stair negotiation - Met  Pt will improve quad strength to 5/5 to ascend 1 flight of stairs reciprocally without UE assist - Progressing  Pt will increase hip and knee strength to grossly 4+/5 to be able to get up and down from the floor safely - Progressing   Pt will demonstrate increased hip ER/ABD strength to 4+/5 to perform stepping and squatting activities without excessive femoral IR/ADD- Progressing  Pt will improve SLS to >30s to improve safety and independence with gait on uneven surfaces such as grass - Progressing   Pt will be independent and compliant with comprehensive HEP to maintain progress achieved in PT - Met   Pt will improve hip extension strength to >/= 4+/5 bilaterally to improve ability to negotiate stairs (New goal- established 9/12/24)       Plan: Continue w/ POC; see intervention grid for progressions   Date: 8/20/2024  TX#: 2/12 5 auth Date:  9/04/24               TX#: 3/12   Date:  9/09/24               TX#: 4/12 Date: 9/12/2024             TX#: 5/12  Progress note  Date:   Tx#: 6/   Therex (40 min) Therex ( 40 min ) Therex ( 40 min ) Therex (42 min)    Nustep   5'   Level 3 NU step x 6 min , level 4 Nu step x 6 min , level 5 Nu step x 6 min , level     LAQ w/ 5'' Hold  20 x R  20 x w/ 1.5# AW L Supine :  Patella mobs in all planes   PROM R knee flex / ext with gentle end range  stretch x 5 min  Supine :  R knee patella mobs in all planes   PROM R knee flex / ext with gentle end range stretch x 5 min      Supine Hamstring Stretch   3 x 30'' Prone :  R knee flexion self stretch 10 x 10 sec hold   Long sit :  R knee extension stretch with heel on yoga block 10 x 10 sec hold   B QS 2 x 10  Seated :  R LAQ 2 x 10  Supine :  R HS stretch with belt assist 10 x 10 sec hold   R knee AAROM of knee flex / ext with gentle end range stretch with heel on SB   2 x 10      Standing Calf Stretch   3 x 30'' Standing :  B gastroc stretch on slant board x 5 with 20 sec hold  B heels raises x 20   B toes raises x 20 Standing :  B gastroc stretch on slant board  5 x 20 sec hold   B heel raises x 20  B toes raises x 20 Standing :  B gastroc stretch on slant board  5 x 20 sec hold   B heel raises 20 x 2  B toes raises 20 x 2     Knee Ext stretch on stool  20x w/ 3'' hold  Standing :  R TKE on wall against yellow ball 2 x 10  Standing :  TKE with RTB 2 x 10  Standing :  TKE with RTB x 20    STS  10x Step up / lateral step on 4 inch step 2 x 10 each  Step up / lateral step up on 6 inch step   2 x 10   Step down on 4 inch step 2 x 10     Step ups  4'' step  RLE  15 x  Shuttle :  B leg press with 50 lbs   2 x 10   R leg press with 25 lbs 2 x 10   Sidestepping in // bars with RTB 2 rounds  Shuttle :  B leg press with 62 lbs 2 x 10   R leg press with 25 lbs   3 x 10 Shuttle :  B leg press with 62 lbs 2 x 15   B single leg press with 37 lbs   2 x 10    Clamshells   20 x B  Standing :  Alt hip abd with RTB 2 x 10   Alt hip ext with RTB 2 x 10   Side stepping with RTB 2 rounds  Standing :  Alt hip abd with BTB x 15    Alt hip ext with BTB x 15   Side stepping with BTB 3 rounds     Calf Raises   W/ BUE support  20 x               Next Visit  Standing Abd  Squats   Leg Press        HEP:   Access Code: [email or web address]  URL: [email or web address]  Date: 09/12/2024  Prepared by: Randi Adam    Exercises  - Supine  Short Arc Quad  - 1 x daily - 7 x weekly - 3 sets - 10 reps  - Seated Knee Flexion AAROM  - 1 x daily - 7 x weekly - 3 sets - 10 reps - 3'' hold  - Supine Heel Slide with Strap  - 1 x daily - 7 x weekly - 3 sets - 10 reps - 3-5'' hold  - Sit to Stand Without Arm Support  - 1 x daily - 7 x weekly - 2 sets - 10 reps  - Seated Long Arc Quad  - 1 x daily - 7 x weekly - 3 sets - 10 reps - 5'' hold  - Seated Knee Extension Stretch with Chair  - 1 x daily - 7 x weekly - 3 sets - 10 reps - 3'' hold  - Side Stepping with Resistance at Thighs and Counter Support  - 1 x daily - 7 x weekly - 3 sets - 10 reps  - Standing Hip Abduction with Resistance at Ankles and Counter Support  - 1 x daily - 7 x weekly - 3 sets - 10 reps  - Standing Hip Extension with Resistance at Ankles and Counter Support  - 1 x daily - 7 x weekly - 3 sets - 10 reps    LEFS Score  LEFS Score: 28.75 % (8/14/2024  3:25 PM)    Post LEFS Score  Post LEFS Score: 22.5 % (9/12/2024 11:48 AM)    -6.25 % improvement    Plan: Continue skilled Physical Therapy 2 x/week or a total of 12 visits over a 90 day period. Treatment will include: therapeutic exercise, modalities as needed, neuromuscular re-education, manual therapy, therapeutic activity, and home exercise program instruction       Patient/Family/Caregiver was advised of these findings, precautions, and treatment options and has agreed to actively participate in planning and for this course of care.    Thank you for your referral. If you have any questions, please contact me at Dept: [phone].    Sincerely,  Electronically signed by therapist: Randi Adam PT     Physician's certification required:  Yes  Please co-sign or sign and return this letter via fax as soon as possible to [phone].   I certify the need for these services furnished under this plan of treatment and while under my care.    X___________________________________________________ Date____________________    Certification From:  9/11/2024  To:12/10/2024       Charges: 3 TE       Total Timed Treatment: 40 min  Total Treatment Time: 40 min

## 2024-09-12 ENCOUNTER — OFFICE VISIT (OUTPATIENT)
Dept: PHYSICAL THERAPY | Age: 59
End: 2024-09-12
Attending: INTERNAL MEDICINE
Payer: COMMERCIAL

## 2024-09-12 PROCEDURE — 97110 THERAPEUTIC EXERCISES: CPT

## 2024-09-16 ENCOUNTER — OFFICE VISIT (OUTPATIENT)
Dept: PHYSICAL THERAPY | Age: 59
End: 2024-09-16
Attending: INTERNAL MEDICINE
Payer: COMMERCIAL

## 2024-09-16 ENCOUNTER — TELEMEDICINE (OUTPATIENT)
Dept: FAMILY MEDICINE CLINIC | Facility: CLINIC | Age: 59
End: 2024-09-16
Payer: COMMERCIAL

## 2024-09-16 DIAGNOSIS — R04.0 EPISTAXIS: ICD-10-CM

## 2024-09-16 DIAGNOSIS — M25.511 ACUTE PAIN OF RIGHT SHOULDER: Primary | ICD-10-CM

## 2024-09-16 PROCEDURE — 97110 THERAPEUTIC EXERCISES: CPT

## 2024-09-16 NOTE — PROGRESS NOTES
Subjective:   Patient ID: Devin Urrutia is a 59 year old male.    HPI  Mr. Urrutia is a pleasant 59-year-old gentleman with known history of asthma, hypothyroidism, hyperlipidemia, arthritis, allergic rhinitis and sinusitis, BPH presenting for video visit today for recurrent epistaxis of the left nostril which occurred a few days ago.  He had seen ENT previously and had seen this for the same reason which had resolved after it was cauterized.  No fever no headaches no dizziness no lightheadedness no cough no chest pain that he reports.  He also has been experiencing right shoulder pain mostly on the superior anterior aspect associated with numbness and tingling which radiates down to his right hand.  No recent injury or trauma.  He had taken over-the-counter pain medications but with no improvement.  I had reviewed past medical and family histories together with allergy and medication lists documented.    History/Other:   Review of Systems   Constitutional:  Negative for fatigue and fever.   HENT:  Negative for congestion, sore throat and trouble swallowing.    Respiratory:  Negative for cough and shortness of breath.    Cardiovascular:  Negative for chest pain.   Gastrointestinal:  Negative for abdominal pain, diarrhea, nausea and vomiting.     Current Outpatient Medications   Medication Sig Dispense Refill    alfuzosin ER 10 MG Oral Tablet 24 Hr Take 1 tablet (10 mg total) by mouth daily. 90 tablet 5    Meloxicam 15 MG Oral Tab Take 1 tablet (15 mg total) by mouth daily. Take 1 tablet by mouth daily for 30 days with food. 30 tablet 0    traMADol 50 MG Oral Tab Please take 1 tablet every 6 hours as needed for pain. Max of 8 tablets per day. Collaborating - Dr. Jennifer Sandoval. 20 tablet 1    Sennosides-Docusate Sodium 8.6-50 MG Oral Cap Take 1 capsule by mouth daily as needed. 30 capsule 1    ondansetron (ZOFRAN) 4 mg tablet Take 1 tablet by mouth every 8 hours as needed for nausea. 8 tablet 0    Meloxicam 15 MG Oral Tab  Take 1 tablet (15 mg total) by mouth daily. 14 tablet 1    fluticasone-umeclidin-vilant (TRELEGY ELLIPTA) 200-62.5-25 MCG/ACT Inhalation Aerosol Powder, Breath Activated Inhale 1 puff into the lungs daily. 60 each 5    budesonide 0.5 MG/2ML Inhalation Suspension Take 2 mL (0.5 mg total) by nebulization daily. 120 mL 5    atorvastatin 10 MG Oral Tab Take 1 tablet (10 mg total) by mouth nightly. 90 tablet 1    albuterol 108 (90 Base) MCG/ACT Inhalation Aero Soln Inhale 1 puff into the lungs every 6 (six) hours as needed. 1 each 1    gabapentin 100 MG Oral Cap Take 3 capsules (300 mg total) by mouth nightly. 100mg at bedtime x 1 week then increase to 200mg at bedtime x 1 week then 300mg at bedtime 90 capsule 2    FLOVENT  MCG/ACT Inhalation Aerosol Inhale 2 puffs into the lungs 2 (two) times daily. 1 each 3    montelukast 10 MG Oral Tab Take 1 tablet (10 mg total) by mouth nightly. 90 tablet 1    Respiratory Therapy Supplies (NEBULIZER) Does not apply Device Please use every 6 hour as needed for Shortness of breath, wheezing 1 Device 0     Allergies:No Known Allergies    Objective:   Physical Exam  Constitutional:       General: He is not in acute distress.  Neurological:      Mental Status: He is alert.         Assessment & Plan:   1. Acute pain of right shoulder   -Will refer for physical therapy  - Likely musculoskeletal in nature  - May take Tylenol as needed at this point   2. Epistaxis   -Unclear as to etiology but possibly vascular  - Will refer to ENT for further evaluation management but go to the emergency room if this worsens     This note was prepared using Dragon Medical voice recognition dictation software. As a result errors may occur. When identified these errors have been corrected. While every attempt is made to correct errors during dictation discrepancies may still exist          No orders of the defined types were placed in this encounter.      Meds This Visit:  Requested Prescriptions       No prescriptions requested or ordered in this encounter       Imaging & Referrals:  OP REFERRAL TO [facility] - INTERNAL

## 2024-09-16 NOTE — PROGRESS NOTES
Diagnosis:   Chondromalacia of right knee (M94.261)          Referring Provider: Jennifer Sandoval Date of Evaluation:    8/15/24    Precautions:  None Next MD visit:   none scheduled  Date of Surgery: n/a   Insurance Primary/Secondary: [employer] / N/A     # Auth Visits: 5              Subjective:   \" My knee is feeling much better and I have less pain with walking . My knee continue to feel stiff after sitting for 30 min \".    Pain: 1/10      Objective:   Edema: at knee jt line R: 40 cm, L 36.5 cm  Figure 8 R: 56, L: 54  Sensation: light touch intact and symmetrical BLE     AROM: (* denotes performed with pain)   Knee at IE     Flexion: R 76; L 136   Extension: R -4; L 0 Flexion: R 131   Extension: R -2      PROM: (* denotes performed with pain)   Knee at IE     Flexion: R 94; L NT   Extension: R -3; L NT Flexion: R ; 132   Extension: R 0      Patellar Mobility/Accessory motion: No restrictions in patellar glides bilaterally     Strength/MMT: (* denotes performed with pain)  Hip at IE 9/12/2024 Knee at IE 9/12/2024   Flexion: R 4-/5; L 4/5  Extension: R NT/5; L NT/5  Abduction: R 4/5; L 4/5  ER: R 3+/5; L 4/5 Flexion: R 4/5; L 4/5  Extension: R 4/5; L 4/5  Abduction: R 4/5; L 4/5  ER: R 4/5; L 4/5 Flexion: R 3+/5; L 4+/5  Extension: R strong quad set; L 4+/5    Flexion: R 5/5; L 4+/5  Extension: R 5/5; L 4+/5      Balance: SLS: R 1.5 sec, L 22 sec at IE   9/12/24: R: 13 sec; L 19.5 sec     Functional Mobility:  At IE 9/12/2024   5x sit<>stand: 26.37 w/ BUE support  TUG (AD, time): 16.77 sec w/ no AD     5x sit<>stand: 18.39 w/ BUE support  TUG (AD, time): 7.97 sec w/ no AD        Assessment:  patient demonstrates  improved quad activation with SLR and presents with improved eccentric right knee control with steps down off 6 inch step .      Goals:   (To be met in 12 visits)   Pt will improve knee extension ROM to 0 deg to allow proper heel strike during gait and terminal knee extension in stance - Progressing  Pt will  improve knee AROM flexion to >125 degrees to improve ability to perform stair negotiation - Met  Pt will improve quad strength to 5/5 to ascend 1 flight of stairs reciprocally without UE assist - Progressing  Pt will increase hip and knee strength to grossly 4+/5 to be able to get up and down from the floor safely - Progressing   Pt will demonstrate increased hip ER/ABD strength to 4+/5 to perform stepping and squatting activities without excessive femoral IR/ADD- Progressing  Pt will improve SLS to >30s to improve safety and independence with gait on uneven surfaces such as grass - Progressing   Pt will be independent and compliant with comprehensive HEP to maintain progress achieved in PT - Met   Pt will improve hip extension strength to >/= 4+/5 bilaterally to improve ability to negotiate stairs (New goal- established 9/12/24)       Plan: Continue w/ POC; see intervention grid for progressions   Date: 8/20/2024  TX#: 2/12  5 auth Date:  9/04/24               TX#: 3/12   Date:  9/09/24               TX#: 4/12 Date: 9/12/2024             TX#: 5/12  Progress note  Date: 9/16/24  Tx#: 6/16   Therex (40 min) Therex ( 40 min ) Therex ( 40 min ) Therex (42 min) Therex ( 40 min )   Nustep   5'   Level 3 NU step x 6 min , level 4 Nu step x 6 min , level 5 Nu step x 6 min , level  NU step x 6 min ,level 5   LAQ w/ 5'' Hold  20 x R  20 x w/ 1.5# AW L Supine :  Patella mobs in all planes   PROM R knee flex / ext with gentle end range stretch x 5 min  Supine :  R knee patella mobs in all planes   PROM R knee flex / ext with gentle end range stretch x 5 min   Supine :  R knee patella mobs in all planes   PROM R knee flex / ext with gentle end range stretch x 5 min    Supine Hamstring Stretch   3 x 30'' Prone :  R knee flexion self stretch 10 x 10 sec hold   Long sit :  R knee extension stretch with heel on yoga block 10 x 10 sec hold   B QS 2 x 10  Seated :  R LAQ 2 x 10  Supine :  R HS stretch with belt assist 10 x 10 sec  hold   R knee AAROM of knee flex / ext with gentle end range stretch with heel on SB   2 x 10   Supine :   R SLR with QS   2 x 10   R SLR with QS with ER   2 x 10   TKE with knee on bloater with 2 lbs   2 x 10    Standing Calf Stretch   3 x 30'' Standing :  B gastroc stretch on slant board x 5 with 20 sec hold  B heels raises x 20   B toes raises x 20 Standing :  B gastroc stretch on slant board  5 x 20 sec hold   B heel raises x 20  B toes raises x 20 Standing :  B gastroc stretch on slant board  5 x 20 sec hold   B heel raises 20 x 2  B toes raises 20 x 2  Standing :  B gastroc stretch on slant board   5 x 20  sec hold   B heel raises x 20  B toes raises x 20   Knee Ext stretch on stool  20x w/ 3'' hold  Standing :  R TKE on wall against yellow ball 2 x 10  Standing :  TKE with RTB 2 x 10  Standing :  TKE with RTB x 20 Standing :  TKE with RTB x 20   Reverse TKE with RTB x 20   STS  10x Step up / lateral step on 4 inch step 2 x 10 each  Step up / lateral step up on 6 inch step   2 x 10   Step down on 4 inch step 2 x 10  Standing on air ex :  Alt hip abd with RTB 2 x 10   Alt hip ext with RTB   2 x 10   Side stepping with RTB  6 rounds    Step ups  4'' step  RLE  15 x  Shuttle :  B leg press with 50 lbs   2 x 10   R leg press with 25 lbs 2 x 10   Sidestepping in // bars with RTB 2 rounds  Shuttle :  B leg press with 62 lbs 2 x 10   R leg press with 25 lbs   3 x 10 Shuttle :  B leg press with 62 lbs 2 x 15   B single leg press with 37 lbs   2 x 10 BOSU :  Alt lunges 2 x 10   Lateral lunges 2 x 10   Squats on 2 blue disks   2 x 10   Balancing on 2 blue discs :  A/P x 20  M/L x 20   Clamshells   20 x B  Standing :  Alt hip abd with RTB 2 x 10   Alt hip ext with RTB 2 x 10   Side stepping with RTB 2 rounds  Standing :  Alt hip abd with BTB x 15    Alt hip ext with BTB x 15   Side stepping with BTB 3 rounds  Step down on 6 inch step 2 x 10    Calf Raises   W/ BUE support  20 x               Next Visit  Standing  Abd  Squats   Leg Press        HEP:   Access [email or web address]: [email or web address]  URL: [email or web address]  Date: 09/12/2024  Prepared by: Randi Adam    Exercises  - Supine Short Arc Quad  - 1 x daily - 7 x weekly - 3 sets - 10 reps  - Seated Knee Flexion AAROM  - 1 x daily - 7 x weekly - 3 sets - 10 reps - 3'' hold  - Supine Heel Slide with Strap  - 1 x daily - 7 x weekly - 3 sets - 10 reps - 3-5'' hold  - Sit to Stand Without Arm Support  - 1 x daily - 7 x weekly - 2 sets - 10 reps  - Seated Long Arc Quad  - 1 x daily - 7 x weekly - 3 sets - 10 reps - 5'' hold  - Seated Knee Extension Stretch with Chair  - 1 x daily - 7 x weekly - 3 sets - 10 reps - 3'' hold  - Side Stepping with Resistance at Thighs and Counter Support  - 1 x daily - 7 x weekly - 3 sets - 10 reps  - Standing Hip Abduction with Resistance at Ankles and Counter Support  - 1 x daily - 7 x weekly - 3 sets - 10 reps  - Standing Hip Extension with Resistance at Ankles and Counter Support  - 1 x daily - 7 x weekly - 3 sets - 10 reps    LEFS Score  LEFS Score: 28.75 % (8/14/2024  3:25 PM)    Post LEFS Score  Post LEFS Score: 22.5 % (9/12/2024 11:48 AM)    -6.25 % improvement    Plan: Continue skilled Physical Therapy 2 x/week or a total of 12 visits over a 90 day period. Treatment will include: therapeutic exercise, modalities as needed, neuromuscular re-education, manual therapy, therapeutic activity, and home exercise program instruction             Charges: 3 TE       Total Timed Treatment: 40 min  Total Treatment Time: 40 min

## 2024-09-19 ENCOUNTER — OFFICE VISIT (OUTPATIENT)
Dept: PHYSICAL THERAPY | Age: 59
End: 2024-09-19
Attending: INTERNAL MEDICINE
Payer: COMMERCIAL

## 2024-09-19 PROCEDURE — 97110 THERAPEUTIC EXERCISES: CPT

## 2024-09-19 NOTE — PROGRESS NOTES
Diagnosis:   Chondromalacia of right knee (M94.261)          Referring Provider: Jennifer Sandoval Date of Evaluation:    8/15/24    Precautions:  None Next MD visit:   none scheduled  Date of Surgery: n/a   Insurance Primary/Secondary: [place] / N/A     # Auth Visits: 5              Subjective:   Pt states he feels that his knee is doing great. He explains he does not have much pain, and \"it feels much better.\"    Pain: 1/10      Objective:        AROM: (* denotes performed with pain)   Knee at IE 9/12/24 9/19/2024    Flexion: R 76; L 136   Extension: R -4; L 0 Flexion: R 131   Extension: R -2 Flexion: R 140           Assessment:  Pt continues to demonstrate improvements in R knee ROM and strength. Challenged with increased weight w/ shuttle press. Pt requesting to be evaluated for shoulder pain, which he brought a new order in from different MD. Explained to pt that due to his insurance, he is only allowed to receive PT for one body part at a time. Explained it is important to continue receiving skilled PT for knee due to post-operative status and persisting decreased eccentric quad control. Explained will re-assess strength approx visit 12 and can discuss possible home management for knee at that time. Pt verbalized understanding and agreement. Pt should continue skilled PT to promote return to PLOF.       Goals:   (To be met in 12 visits)   Pt will improve knee extension ROM to 0 deg to allow proper heel strike during gait and terminal knee extension in stance - Progressing  Pt will improve knee AROM flexion to >125 degrees to improve ability to perform stair negotiation - Met  Pt will improve quad strength to 5/5 to ascend 1 flight of stairs reciprocally without UE assist - Progressing  Pt will increase hip and knee strength to grossly 4+/5 to be able to get up and down from the floor safely - Progressing   Pt will demonstrate increased hip ER/ABD strength to 4+/5 to perform stepping and squatting activities  without excessive femoral IR/ADD- Progressing  Pt will improve SLS to >30s to improve safety and independence with gait on uneven surfaces such as grass - Progressing   Pt will be independent and compliant with comprehensive HEP to maintain progress achieved in PT - Met   Pt will improve hip extension strength to >/= 4+/5 bilaterally to improve ability to negotiate stairs (New goal- established 9/12/24)       Plan: Continue w/ POC; see intervention grid for progressions   Date: 8/20/2024  TX#: 2/12  5 auth Date:  9/04/24               TX#: 3/12   Date:  9/09/24               TX#: 4/12 Date: 9/12/2024             TX#: 5/12  Progress note  Date: 9/16/24  Tx#: 6/16 Date: 9/19/2024  Tx#: 7/16   Therex (40 min) Therex ( 40 min ) Therex ( 40 min ) Therex (42 min) Therex ( 40 min ) Therex (40 min)   Nustep   5'   Level 3 NU step x 6 min , level 4 Nu step x 6 min , level 5 Nu step x 6 min , level  NU step x 6 min ,level 5 Bike x 6' L4   LAQ w/ 5'' Hold  20 x R  20 x w/ 1.5# AW L Supine :  Patella mobs in all planes   PROM R knee flex / ext with gentle end range stretch x 5 min  Supine :  R knee patella mobs in all planes   PROM R knee flex / ext with gentle end range stretch x 5 min   Supine :  R knee patella mobs in all planes   PROM R knee flex / ext with gentle end range stretch x 5 min     Supine Hamstring Stretch   3 x 30'' Prone :  R knee flexion self stretch 10 x 10 sec hold   Long sit :  R knee extension stretch with heel on yoga block 10 x 10 sec hold   B QS 2 x 10  Seated :  R LAQ 2 x 10  Supine :  R HS stretch with belt assist 10 x 10 sec hold   R knee AAROM of knee flex / ext with gentle end range stretch with heel on SB   2 x 10   Supine :   R SLR with QS   2 x 10   R SLR with QS with ER   2 x 10   TKE with knee on bloater with 2 lbs   2 x 10  Supine :   SLR w/ QS 2# 2 x 10   SLR w/ QS w/ ER 2#  2 x 10   SAQ 2# 2 x 10    Standing Calf Stretch   3 x 30'' Standing :  B gastroc stretch on slant board x 5 with 20  sec hold  B heels raises x 20   B toes raises x 20 Standing :  B gastroc stretch on slant board  5 x 20 sec hold   B heel raises x 20  B toes raises x 20 Standing :  B gastroc stretch on slant board  5 x 20 sec hold   B heel raises 20 x 2  B toes raises 20 x 2  Standing :  B gastroc stretch on slant board   5 x 20  sec hold   B heel raises x 20  B toes raises x 20 Standing :  B gastroc stretch on slant board 3 x 30'' hold   Knee Ext stretch on stool  20x w/ 3'' hold  Standing :  R TKE on wall against yellow ball 2 x 10  Standing :  TKE with RTB 2 x 10  Standing :  TKE with RTB x 20 Standing :  TKE with RTB x 20   Reverse TKE with RTB x 20    STS  10x Step up / lateral step on 4 inch step 2 x 10 each  Step up / lateral step up on 6 inch step   2 x 10   Step down on 4 inch step 2 x 10  Standing on air ex :  Alt hip abd with RTB 2 x 10   Alt hip ext with RTB   2 x 10   Side stepping with RTB  6 rounds     Step ups  4'' step  RLE  15 x  Shuttle :  B leg press with 50 lbs   2 x 10   R leg press with 25 lbs 2 x 10   Sidestepping in // bars with RTB 2 rounds  Shuttle :  B leg press with 62 lbs 2 x 10   R leg press with 25 lbs   3 x 10 Shuttle :  B leg press with 62 lbs 2 x 15   B single leg press with 37 lbs   2 x 10 BOSU :  Alt lunges 2 x 10   Lateral lunges 2 x 10   Squats on 2 blue disks   2 x 10   Balancing on 2 blue discs :  A/P x 20  M/L x 20 Shuttle :  B leg press with 75 lbs 2 x 10   B single leg press with 50 lbs   2 x 10  Calf Raises 50# x 10     Clamshells   20 x B  Standing :  Alt hip abd with RTB 2 x 10   Alt hip ext with RTB 2 x 10   Side stepping with RTB 2 rounds  Standing :  Alt hip abd with BTB x 15    Alt hip ext with BTB x 15   Side stepping with BTB 3 rounds  Step down on 6 inch step 2 x 10  Step down on 6 inch step x 10 RLE  Alt hip abd with BTB x 10 ea  Alt hip ext with BTB x 10 ea      Calf Raises   W/ BUE support  20 x                 Next Visit  Standing Abd  Squats   Leg Press         HEP:    Access Code: [email or web address]  URL: [email or web address]  Date: 09/12/2024  Prepared by: Randi Adam    Exercises  - Supine Short Arc Quad  - 1 x daily - 7 x weekly - 3 sets - 10 reps  - Seated Knee Flexion AAROM  - 1 x daily - 7 x weekly - 3 sets - 10 reps - 3'' hold  - Supine Heel Slide with Strap  - 1 x daily - 7 x weekly - 3 sets - 10 reps - 3-5'' hold  - Sit to Stand Without Arm Support  - 1 x daily - 7 x weekly - 2 sets - 10 reps  - Seated Long Arc Quad  - 1 x daily - 7 x weekly - 3 sets - 10 reps - 5'' hold  - Seated Knee Extension Stretch with Chair  - 1 x daily - 7 x weekly - 3 sets - 10 reps - 3'' hold  - Side Stepping with Resistance at Thighs and Counter Support  - 1 x daily - 7 x weekly - 3 sets - 10 reps  - Standing Hip Abduction with Resistance at Ankles and Counter Support  - 1 x daily - 7 x weekly - 3 sets - 10 reps  - Standing Hip Extension with Resistance at Ankles and Counter Support  - 1 x daily - 7 x weekly - 3 sets - 10 reps    Charges: 3 TE       Total Timed Treatment: 40 min  Total Treatment Time: 40 min

## 2024-09-23 ENCOUNTER — OFFICE VISIT (OUTPATIENT)
Dept: PHYSICAL THERAPY | Age: 59
End: 2024-09-23
Attending: INTERNAL MEDICINE
Payer: COMMERCIAL

## 2024-09-23 PROCEDURE — 97140 MANUAL THERAPY 1/> REGIONS: CPT

## 2024-09-23 PROCEDURE — 97110 THERAPEUTIC EXERCISES: CPT

## 2024-09-23 NOTE — PROGRESS NOTES
Diagnosis:   Chondromalacia of right knee (M94.261)          Referring Provider: Jennifer Sandvoal Date of Evaluation:    8/15/24    Precautions:  None Next MD visit:   none scheduled  Date of Surgery: n/a   Insurance Primary/Secondary: [employer] / N/A     # Auth Visits: 5              Subjective:   \" I developed more pain in my right knee on the medial side of patella 6/10 with stairs negotiation '.    Pain: 4 /10  ( right now )       Objective:        AROM: (* denotes performed with pain)   Knee at IE 9/12/24 9/19/2024    Flexion: R 76; L 136   Extension: R -4; L 0 Flexion: R 131   Extension: R -2 Flexion: R 140           Assessment:  initiated STM / ISTM to right medial border of patella and applied leuco tape to correct lateral shift ant tilt of patella . Patient was able to compete all given ex's and was able to perform steps down on 4 inch step  without report of increase in right knee pain . Patient reported significant decrease in medial patella pain with ambulation following manual intervention and taping .      Goals:   (To be met in 12 visits)   Pt will improve knee extension ROM to 0 deg to allow proper heel strike during gait and terminal knee extension in stance - Progressing  Pt will improve knee AROM flexion to >125 degrees to improve ability to perform stair negotiation - Met  Pt will improve quad strength to 5/5 to ascend 1 flight of stairs reciprocally without UE assist - Progressing  Pt will increase hip and knee strength to grossly 4+/5 to be able to get up and down from the floor safely - Progressing   Pt will demonstrate increased hip ER/ABD strength to 4+/5 to perform stepping and squatting activities without excessive femoral IR/ADD- Progressing  Pt will improve SLS to >30s to improve safety and independence with gait on uneven surfaces such as grass - Progressing   Pt will be independent and compliant with comprehensive HEP to maintain progress achieved in PT - Met   Pt will improve hip  extension strength to >/= 4+/5 bilaterally to improve ability to negotiate stairs (New goal- established 9/12/24)       Plan: Continue w/ POC; see intervention grid for progressions   Date:  9/04/24               TX#: 3/12   Date:  9/09/24               TX#: 4/12 Date: 9/12/2024             TX#: 5/12  Progress note  Date: 9/16/24  Tx#: 6/16 Date: 9/19/2024  Tx#: 7/16 Date : 9/23/24  TX # 8/16   Therex ( 40 min ) Therex ( 40 min ) Therex (42 min) Therex ( 40 min ) Therex (40 min) There ex ( 25 min )   NU step x 6 min , level 4 Nu step x 6 min , level 5 Nu step x 6 min , level  NU step x 6 min ,level 5 Recumbent bike x 6 min level 3 NU step x 6 min , level 5   Supine :  Patella mobs in all planes   PROM R knee flex / ext with gentle end range stretch x 5 min  Supine :  R knee patella mobs in all planes   PROM R knee flex / ext with gentle end range stretch x 5 min   Supine :  R knee patella mobs in all planes   PROM R knee flex / ext with gentle end range stretch x 5 min      Prone :  R knee flexion self stretch 10 x 10 sec hold   Long sit :  R knee extension stretch with heel on yoga block 10 x 10 sec hold   B QS 2 x 10  Seated :  R LAQ 2 x 10  Supine :  R HS stretch with belt assist 10 x 10 sec hold   R knee AAROM of knee flex / ext with gentle end range stretch with heel on SB   2 x 10   Supine :   R SLR with QS   2 x 10   R SLR with QS with ER   2 x 10   TKE with knee on bloater with 2 lbs   2 x 10  Supine :   SLR w/ QS 2# 2 x 10   SLR w/ QS w/ ER 2#  2 x 10   SAQ 2# 2 x 10  Supine :  R hip ER/IR self stretch 10 reps x 10 sec hold each  TKE on knee on gray bolster with 1.5 lbs 3 x 10  SLR with QS with 1.5 lbs 2 x 10   SLR with ER with QS with 1.5 lbs   2 x 15      Standing :  B gastroc stretch on slant board x 5 with 20 sec hold  B heels raises x 20   B toes raises x 20 Standing :  B gastroc stretch on slant board  5 x 20 sec hold   B heel raises x 20  B toes raises x 20 Standing :  B gastroc stretch on slant  board  5 x 20 sec hold   B heel raises 20 x 2  B toes raises 20 x 2  Standing :  B gastroc stretch on slant board   5 x 20  sec hold   B heel raises x 20  B toes raises x 20 Standing :  B gastroc stretch on slant board 3 x 30'' hold Standing :  B gastroc stretch on slant board   5 x 20 sec hold   B heel raises x 20  B toes raises x 20   Standing :  R TKE on wall against yellow ball 2 x 10  Standing :  TKE with RTB 2 x 10  Standing :  TKE with RTB x 20 Standing :  TKE with RTB x 20   Reverse TKE with RTB x 20  Standing :  R knee step down ( with focus on eccentric control ) :  Step down on 4 inch step :  Forward 2 x 10   Lateral 2 x 10   Posterior 2 x 10    Step up / lateral step on 4 inch step 2 x 10 each  Step up / lateral step up on 6 inch step   2 x 10   Step down on 4 inch step 2 x 10  Standing on air ex :  Alt hip abd with RTB 2 x 10   Alt hip ext with RTB   2 x 10   Side stepping with RTB  6 rounds   Sidestepping with RTB 2 rounds    Shuttle :  B leg press with 50 lbs   2 x 10   R leg press with 25 lbs 2 x 10   Sidestepping in // bars with RTB 2 rounds  Shuttle :  B leg press with 62 lbs 2 x 10   R leg press with 25 lbs   3 x 10 Shuttle :  B leg press with 62 lbs 2 x 15   B single leg press with 37 lbs   2 x 10 BOSU :  Alt lunges 2 x 10   Lateral lunges 2 x 10   Squats on 2 blue disks   2 x 10   Balancing on 2 blue discs :  A/P x 20  M/L x 20 Shuttle :  B leg press with 75 lbs 2 x 10   B single leg press with 50 lbs   2 x 10  Calf Raises 50# x 10       Standing :  Alt hip abd with RTB 2 x 10   Alt hip ext with RTB 2 x 10   Side stepping with RTB 2 rounds  Standing :  Alt hip abd with BTB x 15    Alt hip ext with BTB x 15   Side stepping with BTB 3 rounds  Step down on 6 inch step 2 x 10  Step down on 6 inch step x 10 RLE  Alt hip abd with BTB x 10 ea  Alt hip ext with BTB x 10 ea            Manual therapy x 20 min :        STM / ISTM to medial R patella followed by L ITB FR   L patella tape to correct left  patella lateral shift and tilt           HEP:   Access Code: [email or web address]  URL: [email or web address]  Date: 09/12/2024  Prepared by: Randi Adam    Exercises  - Supine Short Arc Quad  - 1 x daily - 7 x weekly - 3 sets - 10 reps  - Seated Knee Flexion AAROM  - 1 x daily - 7 x weekly - 3 sets - 10 reps - 3'' hold  - Supine Heel Slide with Strap  - 1 x daily - 7 x weekly - 3 sets - 10 reps - 3-5'' hold  - Sit to Stand Without Arm Support  - 1 x daily - 7 x weekly - 2 sets - 10 reps  - Seated Long Arc Quad  - 1 x daily - 7 x weekly - 3 sets - 10 reps - 5'' hold  - Seated Knee Extension Stretch with Chair  - 1 x daily - 7 x weekly - 3 sets - 10 reps - 3'' hold  - Side Stepping with Resistance at Thighs and Counter Support  - 1 x daily - 7 x weekly - 3 sets - 10 reps  - Standing Hip Abduction with Resistance at Ankles and Counter Support  - 1 x daily - 7 x weekly - 3 sets - 10 reps  - Standing Hip Extension with Resistance at Ankles and Counter Support  - 1 x daily - 7 x weekly - 3 sets - 10 reps    Charges: 2 TE , manual mobs x 1 Total Timed Treatment: 40 min  Total Treatment Time: 40 min

## 2024-09-24 ENCOUNTER — OFFICE VISIT (OUTPATIENT)
Facility: CLINIC | Age: 59
End: 2024-09-24
Payer: COMMERCIAL

## 2024-09-24 VITALS
HEART RATE: 82 BPM | SYSTOLIC BLOOD PRESSURE: 110 MMHG | HEIGHT: 71 IN | DIASTOLIC BLOOD PRESSURE: 64 MMHG | RESPIRATION RATE: 15 BRPM | OXYGEN SATURATION: 96 % | BODY MASS INDEX: 26.04 KG/M2 | WEIGHT: 186 LBS

## 2024-09-24 DIAGNOSIS — J45.50 SEVERE PERSISTENT ASTHMA WITHOUT COMPLICATION (HCC): Primary | ICD-10-CM

## 2024-09-24 PROCEDURE — 99214 OFFICE O/P EST MOD 30 MIN: CPT | Performed by: INTERNAL MEDICINE

## 2024-09-24 RX ORDER — MONTELUKAST SODIUM 10 MG/1
10 TABLET ORAL NIGHTLY
Qty: 90 TABLET | Refills: 3 | Status: SHIPPED | OUTPATIENT
Start: 2024-09-24

## 2024-09-24 NOTE — PROGRESS NOTES
[facility] Pulmonary Follow Up Note    Chief Complaint:  Chief Complaint   Patient presents with    Follow - Up     Follow up on labs 8/15       History of Present Illness:  Devin Urrutia is a 59 year old male who presents today for follow up of asthma.  Patient has longstanding history of asthma, has mostly been in control but over the past several months.  Not clear if an acute illness has triggered it at that time.  Has seasonal alleriges.  No known triggers.  Former smoker, quit 25 years ago, smoked for a long time.  No history of rashes/eczema.  Works in gas station business, has 1 cat.     Interval history:  Since last visit, patient started on Trelegy and to Singulair has been doing very well from a breathing standpoint.      Past Medical History:   Past Medical History:    Allergic rhinitis    Asthma (HCC)    High cholesterol    Hyperlipidemia    Visual impairment        Past Surgical History:   Past Surgical History:   Procedure Laterality Date    Arthroscopy of joint unlisted      Colonoscopy         Family Medical History:   Family History   Problem Relation Age of Onset    Heart Disorder Father     Hypertension Mother     Heart Disorder Mother     Asthma Mother         Social History:   Social History     Socioeconomic History    Marital status:      Spouse name: Not on file    Number of children: Not on file    Years of education: Not on file    Highest education level: Not on file   Occupational History    Not on file   Tobacco Use    Smoking status: Former     Current packs/day: 0.00     Average packs/day: 0.5 packs/day for 25.0 years (12.5 ttl pk-yrs)     Types: Cigarettes     Start date:      Quit date:      Years since quittin.7     Passive exposure: Past    Smokeless tobacco: Former    Tobacco comments:     N   Vaping Use    Vaping status: Never Used   Substance and Sexual Activity    Alcohol use: Never    Drug use: Never    Sexual activity: Not on file   Other Topics Concern     Caffeine Concern Yes     Comment: occassionally    Exercise Yes     Comment: every other day    Seat Belt Not Asked    Special Diet Not Asked    Stress Concern Not Asked    Weight Concern Not Asked     Service Not Asked    Blood Transfusions Not Asked    Occupational Exposure Not Asked    Hobby Hazards Not Asked    Sleep Concern Not Asked    Back Care Not Asked    Bike Helmet Not Asked    Self-Exams Not Asked   Social History Narrative    Not on file     Social Determinants of Health     Financial Resource Strain: Not on file   Food Insecurity: Not on file   Transportation Needs: Not on file   Physical Activity: Not on file   Stress: Not on file   Social Connections: Unknown (3/14/2021)    [facility]    Social Connections     Conversations with friends/family/neighbors per week: Not on file   Housing Stability: Low Risk  (7/8/2021)    Received from [facility], [facility]    Housing Stability     Mortgage Payment Concerns?: Not on file     Number of Places Lived in the Last Year: Not on file     Unstable Housing?: Not on file        Medications:   Current Outpatient Medications   Medication Sig Dispense Refill    montelukast 10 MG Oral Tab Take 1 tablet (10 mg total) by mouth nightly. 90 tablet 3    alfuzosin ER 10 MG Oral Tablet 24 Hr Take 1 tablet (10 mg total) by mouth daily. 90 tablet 5    traMADol 50 MG Oral Tab Please take 1 tablet every 6 hours as needed for pain. Max of 8 tablets per day. Collaborating - Dr. Jennifer Sandoval. 20 tablet 1    Sennosides-Docusate Sodium 8.6-50 MG Oral Cap Take 1 capsule by mouth daily as needed. 30 capsule 1    ondansetron (ZOFRAN) 4 mg tablet Take 1 tablet by mouth every 8 hours as needed for nausea. 8 tablet 0    Meloxicam 15 MG Oral Tab Take 1 tablet (15 mg total) by mouth daily. 14 tablet 1    fluticasone-umeclidin-vilant (TRELEGY ELLIPTA) 200-62.5-25 MCG/ACT  Inhalation Aerosol Powder, Breath Activated Inhale 1 puff into the lungs daily. 60 each 5    budesonide 0.5 MG/2ML Inhalation Suspension Take 2 mL (0.5 mg total) by nebulization daily. 120 mL 5    atorvastatin 10 MG Oral Tab Take 1 tablet (10 mg total) by mouth nightly. 90 tablet 1    albuterol 108 (90 Base) MCG/ACT Inhalation Aero Soln Inhale 1 puff into the lungs every 6 (six) hours as needed. 1 each 1    gabapentin 100 MG Oral Cap Take 3 capsules (300 mg total) by mouth nightly. 100mg at bedtime x 1 week then increase to 200mg at bedtime x 1 week then 300mg at bedtime 90 capsule 2    FLOVENT  MCG/ACT Inhalation Aerosol Inhale 2 puffs into the lungs 2 (two) times daily. 1 each 3    Respiratory Therapy Supplies (NEBULIZER) Does not apply Device Please use every 6 hour as needed for Shortness of breath, wheezing 1 Device 0       Review of Systems: Review of Systems     Physical Exam:  /64 (BP Location: Left arm, Patient Position: Sitting, Cuff Size: adult)   Pulse 82   Resp 15   Ht 5' 11\" (1.803 m)   Wt 186 lb (84.4 kg)   SpO2 96%   BMI 25.94 kg/m²      Constitutional: alert, cooperative. No acute distress.  HEENT: Head NC/AT. Nares normal. Septum midline. Mucosa normal. No drainage or sinus tenderness.  Cardio: Regular rate and rhythm. Normal S1 and S2. No murmurs.   Respiratory: Thorax symmetrical with no labored breathing. clear to auscultation bilaterally  Extremities: No clubbing or cyanosis. No BLE edema.    Neurologic: A&Ox3. No gross motor deficits.  Skin: Warm, dry  Psych: Calm, cooperative. Pleasant affect.    Results:  Personally reviewed  CARD NUC STRESS TEST LEXISCAN (CPT 08482/19542/)  Table formatting from the original result was not included.  PATIENT'S NAME: Devin Urrutia  ORDERING PHYSICIAN: Braxton Rivas MD    PRIMARY CARE PHYSICIAN:  Braxton Rivas MD      MEDICAL RECORD #:   [id number]      PATIENT ACCOUNT#:  [id number]  YOB: 1965  AGE AT TEST:    59  year old  DATE OF EXAM:  7/23/24  INDICATION:   SOB (shortness of breath), Mild persistent asthma without   complication (HCC)                       CARDIOVASCULAR NUCLEAR MEDICINE STUDY  MYOCARDIAL PERFUSION    RESTING EKG:   Normal sinus rhythm.    STAGE      BP HR   REST 113/75 62   Infusion 1 min   80   2 115/68 90   3  85   4 114/70 82   5     RECOVERY      2 minute post 136/70 80   4 minute post  74   6 minute post     EKG response  Normal    Lexiscan dose:  0.4 mg          DEMOGRAPHICS:   Gender: Male. Height: 71 inches. Weight: 186 pounds.    CLINICAL HISTORY: The patient is a 59-year-old gentleman with dyslipidemia   and shortness of breath.     REGADENOSON FINDINGS:  The patient underwent intravenous infusion of   regadenoson at a dose of 0.4 mg over 15 to 20 seconds followed immediately   by 5 mL saline flush. The electrocardiographic response to regadenoson   infusion was normal. The patient denied cardiac symptoms. No arrhythmias   noted.    SCINTIGRAPHIC FINDINGS:  Antecubital intravenous access was established.    Routine rest/stress gated SPECT myocardial perfusion imaging was   performed. The patient received 8.1 mCi technetium-99m sestamibi at rest   and 24.2 mCi at stress. A review of the projection images revealed no   significant motion. There was diaphragmatic attenuation of the ventricle.     GATED SPECT ANALYSIS:  The gated study revealed normal left ventricular   systolic function. The calculated left ventricular ejection fraction was   65%.    PERFUSION IMAGING:  The perfusion images were normal with the exception of   diaphragmatic attenuation of the ventricle.       IMPRESSION:  Normal rest/stress gated SPECT myocardial perfusion scan.  Normal left ventricular systolic function.   Normal electrocardiographic response to regadenoson infusion.        boone Zavala MD 07/23/2024 17:12  t   [id number] 7/24/2024 8:42 AM #[id number]    cc:         PFTs:       No data to display                    No data to display                    WBC: 7.9, done on 8/15/2024.  HGB: 13.2, done on 8/15/2024.  PLT: 243, done on 8/15/2024.     Glucose: 103, done on 1/23/2024.  Cr: 0.96, done on 1/23/2024.  Last eGFR was 93 on 1/23/2024.  CA: 8.8, done on 1/23/2024.  Na: 140, done on 1/23/2024.  K: 4.1, done on 1/23/2024.  Cl: 110, done on 1/23/2024.  CO2: 29, done on 1/23/2024.  Last ALB was 3.8% done on 1/23/2024.     No results found.     Assessment/Plan:  #1. Severe persistent allergic eosinophilic asthma  Reviewed asthma workup with patient  Controlled on trelegy, montelukast, and albuterol  If breathing worsens from here would add budesonide neb back  If exacerbated from here further would start fasenra or dupixent    Return in about 6 months (around 3/24/2025).    I spent 30 minutes obtaining and reviewing records, preparing for the visit including reviewing chart and prior testing, face to face time examining the patient and obtaining history, counseling, arranging and reviewing office-based testing, independently reviewing relevant studies and documentation exclusive of other billable procedures.      Lexus Vieyra MD  9/24/2024

## 2024-09-25 NOTE — PROGRESS NOTES
Diagnosis:   Chondromalacia of right knee (M94.261)          Referring Provider: Jennifer Sandoval Date of Evaluation:    8/15/24    Precautions:  None Next MD visit:   none scheduled  Date of Surgery: n/a   Insurance Primary/Secondary: [employer] / N/A     # Auth Visits: 5              Subjective: ***  \" I developed more pain in my right knee on the medial side of patella 6/10 with stairs negotiation '.    Pain: ***4 /10  ( right now )       Objective:        AROM: (* denotes performed with pain)   Knee at IE 9/12/24 9/19/2024    Flexion: R 76; L 136   Extension: R -4; L 0 Flexion: R 131   Extension: R -2 Flexion: R 140           Assessment:  ***initiated STM / ISTM to right medial border of patella and applied leuco tape to correct lateral shift ant tilt of patella . Patient was able to compete all given ex's and was able to perform steps down on 4 inch step  without report of increase in right knee pain . Patient reported significant decrease in medial patella pain with ambulation following manual intervention and taping .      Goals:   (To be met in 12 visits)   Pt will improve knee extension ROM to 0 deg to allow proper heel strike during gait and terminal knee extension in stance - Progressing  Pt will improve knee AROM flexion to >125 degrees to improve ability to perform stair negotiation - Met  Pt will improve quad strength to 5/5 to ascend 1 flight of stairs reciprocally without UE assist - Progressing  Pt will increase hip and knee strength to grossly 4+/5 to be able to get up and down from the floor safely - Progressing   Pt will demonstrate increased hip ER/ABD strength to 4+/5 to perform stepping and squatting activities without excessive femoral IR/ADD- Progressing  Pt will improve SLS to >30s to improve safety and independence with gait on uneven surfaces such as grass - Progressing   Pt will be independent and compliant with comprehensive HEP to maintain progress achieved in PT - Met   Pt will improve  hip extension strength to >/= 4+/5 bilaterally to improve ability to negotiate stairs (New goal- established 9/12/24)       Plan: Continue w/ POC; see intervention grid for progressions   Date:  9/04/24               TX#: 3/12   Date:  9/09/24               TX#: 4/12 Date: 9/12/2024             TX#: 5/12  Progress note  Date: 9/16/24  Tx#: 6/16 Date: 9/19/2024  Tx#: 7/16 Date : 9/23/24  TX # 8/16    Therex ( 40 min ) Therex ( 40 min ) Therex (42 min) Therex ( 40 min ) Therex (40 min) There ex ( 25 min )    NU step x 6 min , level 4 Nu step x 6 min , level 5 Nu step x 6 min , level  NU step x 6 min ,level 5 Recumbent bike x 6 min level 3 NU step x 6 min , level 5    Supine :  Patella mobs in all planes   PROM R knee flex / ext with gentle end range stretch x 5 min  Supine :  R knee patella mobs in all planes   PROM R knee flex / ext with gentle end range stretch x 5 min   Supine :  R knee patella mobs in all planes   PROM R knee flex / ext with gentle end range stretch x 5 min       Prone :  R knee flexion self stretch 10 x 10 sec hold   Long sit :  R knee extension stretch with heel on yoga block 10 x 10 sec hold   B QS 2 x 10  Seated :  R LAQ 2 x 10  Supine :  R HS stretch with belt assist 10 x 10 sec hold   R knee AAROM of knee flex / ext with gentle end range stretch with heel on SB   2 x 10   Supine :   R SLR with QS   2 x 10   R SLR with QS with ER   2 x 10   TKE with knee on bloater with 2 lbs   2 x 10  Supine :   SLR w/ QS 2# 2 x 10   SLR w/ QS w/ ER 2#  2 x 10   SAQ 2# 2 x 10  Supine :  R hip ER/IR self stretch 10 reps x 10 sec hold each  TKE on knee on gray bolster with 1.5 lbs 3 x 10  SLR with QS with 1.5 lbs 2 x 10   SLR with ER with QS with 1.5 lbs   2 x 15       Standing :  B gastroc stretch on slant board x 5 with 20 sec hold  B heels raises x 20   B toes raises x 20 Standing :  B gastroc stretch on slant board  5 x 20 sec hold   B heel raises x 20  B toes raises x 20 Standing :  B gastroc stretch on  slant board  5 x 20 sec hold   B heel raises 20 x 2  B toes raises 20 x 2  Standing :  B gastroc stretch on slant board   5 x 20  sec hold   B heel raises x 20  B toes raises x 20 Standing :  B gastroc stretch on slant board 3 x 30'' hold Standing :  B gastroc stretch on slant board   5 x 20 sec hold   B heel raises x 20  B toes raises x 20    Standing :  R TKE on wall against yellow ball 2 x 10  Standing :  TKE with RTB 2 x 10  Standing :  TKE with RTB x 20 Standing :  TKE with RTB x 20   Reverse TKE with RTB x 20  Standing :  R knee step down ( with focus on eccentric control ) :  Step down on 4 inch step :  Forward 2 x 10   Lateral 2 x 10   Posterior 2 x 10     Step up / lateral step on 4 inch step 2 x 10 each  Step up / lateral step up on 6 inch step   2 x 10   Step down on 4 inch step 2 x 10  Standing on air ex :  Alt hip abd with RTB 2 x 10   Alt hip ext with RTB   2 x 10   Side stepping with RTB  6 rounds   Sidestepping with RTB 2 rounds     Shuttle :  B leg press with 50 lbs   2 x 10   R leg press with 25 lbs 2 x 10   Sidestepping in // bars with RTB 2 rounds  Shuttle :  B leg press with 62 lbs 2 x 10   R leg press with 25 lbs   3 x 10 Shuttle :  B leg press with 62 lbs 2 x 15   B single leg press with 37 lbs   2 x 10 BOSU :  Alt lunges 2 x 10   Lateral lunges 2 x 10   Squats on 2 blue disks   2 x 10   Balancing on 2 blue discs :  A/P x 20  M/L x 20 Shuttle :  B leg press with 75 lbs 2 x 10   B single leg press with 50 lbs   2 x 10  Calf Raises 50# x 10        Standing :  Alt hip abd with RTB 2 x 10   Alt hip ext with RTB 2 x 10   Side stepping with RTB 2 rounds  Standing :  Alt hip abd with BTB x 15    Alt hip ext with BTB x 15   Side stepping with BTB 3 rounds  Step down on 6 inch step 2 x 10  Step down on 6 inch step x 10 RLE  Alt hip abd with BTB x 10 ea  Alt hip ext with BTB x 10 ea             Manual therapy x 20 min :         STM / ISTM to medial R patella followed by L ITB FR   L patella tape to  correct left patella lateral shift and tilt             HEP:   Access Code: [email or web address]  URL: [email or web address]  Date: 09/12/2024  Prepared by: Randi Adam    Exercises  - Supine Short Arc Quad  - 1 x daily - 7 x weekly - 3 sets - 10 reps  - Seated Knee Flexion AAROM  - 1 x daily - 7 x weekly - 3 sets - 10 reps - 3'' hold  - Supine Heel Slide with Strap  - 1 x daily - 7 x weekly - 3 sets - 10 reps - 3-5'' hold  - Sit to Stand Without Arm Support  - 1 x daily - 7 x weekly - 2 sets - 10 reps  - Seated Long Arc Quad  - 1 x daily - 7 x weekly - 3 sets - 10 reps - 5'' hold  - Seated Knee Extension Stretch with Chair  - 1 x daily - 7 x weekly - 3 sets - 10 reps - 3'' hold  - Side Stepping with Resistance at Thighs and Counter Support  - 1 x daily - 7 x weekly - 3 sets - 10 reps  - Standing Hip Abduction with Resistance at Ankles and Counter Support  - 1 x daily - 7 x weekly - 3 sets - 10 reps  - Standing Hip Extension with Resistance at Ankles and Counter Support  - 1 x daily - 7 x weekly - 3 sets - 10 reps    Charges: *** 2 TE , manual mobs x 1 Total Timed Treatment: *** min  Total Treatment Time: *** min

## 2024-09-26 ENCOUNTER — APPOINTMENT (OUTPATIENT)
Dept: PHYSICAL THERAPY | Age: 59
End: 2024-09-26
Attending: INTERNAL MEDICINE
Payer: COMMERCIAL

## 2024-09-30 ENCOUNTER — APPOINTMENT (OUTPATIENT)
Dept: PHYSICAL THERAPY | Age: 59
End: 2024-09-30
Attending: INTERNAL MEDICINE
Payer: COMMERCIAL

## 2024-09-30 ENCOUNTER — TELEMEDICINE (OUTPATIENT)
Dept: FAMILY MEDICINE CLINIC | Facility: CLINIC | Age: 59
End: 2024-09-30
Payer: COMMERCIAL

## 2024-09-30 DIAGNOSIS — J30.1 NON-SEASONAL ALLERGIC RHINITIS DUE TO POLLEN: ICD-10-CM

## 2024-09-30 DIAGNOSIS — J01.91 ACUTE RECURRENT SINUSITIS, UNSPECIFIED LOCATION: Primary | ICD-10-CM

## 2024-09-30 PROCEDURE — 99214 OFFICE O/P EST MOD 30 MIN: CPT | Performed by: FAMILY MEDICINE

## 2024-09-30 RX ORDER — OLOPATADINE HYDROCHLORIDE 2 MG/ML
1 SOLUTION/ DROPS OPHTHALMIC 2 TIMES DAILY PRN
Qty: 2.5 ML | Refills: 1 | Status: SHIPPED | OUTPATIENT
Start: 2024-09-30

## 2024-09-30 RX ORDER — METHYLPREDNISOLONE 4 MG
TABLET, DOSE PACK ORAL
Qty: 1 EACH | Refills: 0 | Status: SHIPPED | OUTPATIENT
Start: 2024-09-30

## 2024-09-30 NOTE — PROGRESS NOTES
Subjective:   Patient ID: Devin Urrutia is a 59 year old male.    HPI  Mr. Urrutia is a very pleasant 59-year-old gentleman with history of hyperlipidemia, hypothyroidism, asthma, allergic rhinitis, arthritis, history of sinusitis presenting for video visit today for sinus and chest congestion for the past several days associated with bodyaches, runny nose and sneezing.  He also has itchy eyes.  He has been taking his medications compliantly but with not much effect.  He does not have fever or chills, cough or shortness of breath.  No sick contacts that he reports. I had reviewed past medical and family histories together with allergy and medication lists documented.    History/Other:   Review of Systems   Constitutional:  Negative for fatigue and fever.   Cardiovascular:  Negative for chest pain.   Gastrointestinal:  Negative for abdominal pain, diarrhea, nausea and vomiting.     Current Outpatient Medications   Medication Sig Dispense Refill    amoxicillin clavulanate 875-125 MG Oral Tab Take 1 tablet by mouth 2 (two) times daily for 10 days. 20 tablet 0    methylPREDNISolone (MEDROL) 4 MG Oral Tablet Therapy Pack As directed. 1 each 0    Olopatadine HCl 0.2 % Ophthalmic Solution Apply 1 Application to eye 2 (two) times daily as needed. 2.5 mL 1    montelukast 10 MG Oral Tab Take 1 tablet (10 mg total) by mouth nightly. 90 tablet 3    alfuzosin ER 10 MG Oral Tablet 24 Hr Take 1 tablet (10 mg total) by mouth daily. 90 tablet 5    traMADol 50 MG Oral Tab Please take 1 tablet every 6 hours as needed for pain. Max of 8 tablets per day. Collaborating - Dr. Jennifer Sandoval. 20 tablet 1    Sennosides-Docusate Sodium 8.6-50 MG Oral Cap Take 1 capsule by mouth daily as needed. 30 capsule 1    ondansetron (ZOFRAN) 4 mg tablet Take 1 tablet by mouth every 8 hours as needed for nausea. 8 tablet 0    Meloxicam 15 MG Oral Tab Take 1 tablet (15 mg total) by mouth daily. 14 tablet 1    fluticasone-umeclidin-vilant (TRELEGY ELLIPTA)  200-62.5-25 MCG/ACT Inhalation Aerosol Powder, Breath Activated Inhale 1 puff into the lungs daily. 60 each 5    budesonide 0.5 MG/2ML Inhalation Suspension Take 2 mL (0.5 mg total) by nebulization daily. 120 mL 5    atorvastatin 10 MG Oral Tab Take 1 tablet (10 mg total) by mouth nightly. 90 tablet 1    albuterol 108 (90 Base) MCG/ACT Inhalation Aero Soln Inhale 1 puff into the lungs every 6 (six) hours as needed. 1 each 1    gabapentin 100 MG Oral Cap Take 3 capsules (300 mg total) by mouth nightly. 100mg at bedtime x 1 week then increase to 200mg at bedtime x 1 week then 300mg at bedtime 90 capsule 2    FLOVENT  MCG/ACT Inhalation Aerosol Inhale 2 puffs into the lungs 2 (two) times daily. 1 each 3    Respiratory Therapy Supplies (NEBULIZER) Does not apply Device Please use every 6 hour as needed for Shortness of breath, wheezing 1 Device 0     Allergies:No Known Allergies    Objective:   Physical Exam  Constitutional:       General: He is not in acute distress.  Neurological:      Mental Status: He is alert.         Assessment & Plan:   1. Acute recurrent sinusitis, unspecified location   -Keep hydrated  - May take Tylenol as needed for fever or pain  - We will treat with Augmentin and Medrol Dosepak  - Go to the emergency room if with respiratory distress  - Call or come us if symptoms worsen or persist   2. Non-seasonal allergic rhinitis due to pollen   -Continue with current allergy medications  - Will send for Pataday as directed     This note was prepared using Dragon Medical voice recognition dictation software. As a result errors may occur. When identified these errors have been corrected. While every attempt is made to correct errors during dictation discrepancies may still exist          No orders of the defined types were placed in this encounter.      Meds This Visit:  Requested Prescriptions     Signed Prescriptions Disp Refills    amoxicillin clavulanate 875-125 MG Oral Tab 20 tablet 0      Sig: Take 1 tablet by mouth 2 (two) times daily for 10 days.    methylPREDNISolone (MEDROL) 4 MG Oral Tablet Therapy Pack 1 each 0     Sig: As directed.    Olopatadine HCl 0.2 % Ophthalmic Solution 2.5 mL 1     Sig: Apply 1 Application to eye 2 (two) times daily as needed.       Imaging & Referrals:  None

## 2024-10-03 ENCOUNTER — OFFICE VISIT (OUTPATIENT)
Dept: PHYSICAL THERAPY | Age: 59
End: 2024-10-03
Attending: INTERNAL MEDICINE
Payer: COMMERCIAL

## 2024-10-03 PROCEDURE — 97140 MANUAL THERAPY 1/> REGIONS: CPT

## 2024-10-03 PROCEDURE — 97110 THERAPEUTIC EXERCISES: CPT

## 2024-10-03 NOTE — PROGRESS NOTES
Diagnosis:   Chondromalacia of right knee (M94.261)          Referring Provider: Jennifer Sandoval Date of Evaluation:    8/15/24    Precautions:  None Next MD visit:   none scheduled  Date of Surgery: n/a   Insurance Primary/Secondary: [employer] / N/A     # Auth Visits: 5              Subjective:   Pt states his knee pain when going up and down stairs has significantly improved.     Pain: 3/10      Objective:        AROM: (* denotes performed with pain)   Knee at IE 9/12/24 9/19/2024    Flexion: R 76; L 136   Extension: R -4; L 0 Flexion: R 131   Extension: R -2 Flexion: R 140           Assessment:  Pt returns to clinic with improving pain levels following last treatment session. Manual therapy continued to address persisting soft tissue restriction. Notable fibrotic changes in distal ITB. Leukotape applied for correction of lateral patellar tilt. Pt continues to be limited in eccentric quad control. Shuttle press held secondary to pt reports of increased pain in shoulder when performing. Pt should continue skilled PT to promote return to PLOF.       Goals:   (To be met in 12 visits)   Pt will improve knee extension ROM to 0 deg to allow proper heel strike during gait and terminal knee extension in stance - Progressing  Pt will improve knee AROM flexion to >125 degrees to improve ability to perform stair negotiation - Met  Pt will improve quad strength to 5/5 to ascend 1 flight of stairs reciprocally without UE assist - Progressing  Pt will increase hip and knee strength to grossly 4+/5 to be able to get up and down from the floor safely - Progressing   Pt will demonstrate increased hip ER/ABD strength to 4+/5 to perform stepping and squatting activities without excessive femoral IR/ADD- Progressing  Pt will improve SLS to >30s to improve safety and independence with gait on uneven surfaces such as grass - Progressing   Pt will be independent and compliant with comprehensive HEP to maintain progress achieved in PT -  Met   Pt will improve hip extension strength to >/= 4+/5 bilaterally to improve ability to negotiate stairs (New goal- established 9/12/24)       Plan: Continue w/ POC; see intervention grid for progressions   Date: 9/16/24  Tx#: 6/16 Date: 9/19/2024  Tx#: 7/16 Date : 9/23/24  TX # 8/16 Date : 10/3/2024  TX # 9/16   Therex ( 40 min ) Therex (40 min) There ex ( 25 min ) Therex (25 min)   NU step x 6 min ,level 5 Recumbent bike x 6 min level 3 NU step x 6 min , level 5 Upright Bike L4  X 5'   Supine :  R knee patella mobs in all planes   PROM R knee flex / ext with gentle end range stretch x 5 min       Supine :   R SLR with QS   2 x 10   R SLR with QS with ER   2 x 10   TKE with knee on bloater with 2 lbs   2 x 10  Supine :   SLR w/ QS 2# 2 x 10   SLR w/ QS w/ ER 2#  2 x 10   SAQ 2# 2 x 10  Supine :  R hip ER/IR self stretch 10 reps x 10 sec hold each  TKE on knee on gray bolster with 1.5 lbs 3 x 10  SLR with QS with 1.5 lbs 2 x 10   SLR with ER with QS with 1.5 lbs   2 x 15    Supine:  SAQ 2 lbs 2 x 10  SLR with QS with  2 lbs 2 x 10    Standing :  B gastroc stretch on slant board   5 x 20  sec hold   B heel raises x 20  B toes raises x 20 Standing :  B gastroc stretch on slant board 3 x 30'' hold Standing :  B gastroc stretch on slant board   5 x 20 sec hold   B heel raises x 20  B toes raises x 20 Standing   B heel raises x 20  B toes raises x 20  Hip Abd BTB 20 x 2 ea  Hip Ext BTB 20 x 2 ea   Standing :  TKE with RTB x 20   Reverse TKE with RTB x 20  Standing :  R knee step down ( with focus on eccentric control ) :  Step down on 4 inch step :  Forward 2 x 10   Lateral 2 x 10   Posterior 2 x 10     Standing on air ex :  Alt hip abd with RTB 2 x 10   Alt hip ext with RTB   2 x 10   Side stepping with RTB  6 rounds   Sidestepping with RTB 2 rounds     BOSU :  Alt lunges 2 x 10   Lateral lunges 2 x 10   Squats on 2 blue disks   2 x 10   Balancing on 2 blue discs :  A/P x 20  M/L x 20 Shuttle :  B leg press with 75  lbs 2 x 10   B single leg press with 50 lbs   2 x 10  Calf Raises 50# x 10       Step down on 6 inch step 2 x 10  Step down on 6 inch step x 10 RLE  Alt hip abd with BTB x 10 ea  Alt hip ext with BTB x 10 ea          Manual therapy x 20 min : Manual therapy (20 min)     STM / ISTM to medial R patella followed by L ITB FR   L patella tape to correct left patella lateral shift and tilt IASTM to medial R patella, distal quad, distal ITB, patellar ligament x 15'  FR to IRB x 2'   L patella tape to correct left patella lateral shift and tilt x 3'         HEP:   Access Code: [email or web address]  URL: [email or web address]  Date: 09/12/2024  Prepared by: Randi Adam    Exercises  - Supine Short Arc Quad  - 1 x daily - 7 x weekly - 3 sets - 10 reps  - Seated Knee Flexion AAROM  - 1 x daily - 7 x weekly - 3 sets - 10 reps - 3'' hold  - Supine Heel Slide with Strap  - 1 x daily - 7 x weekly - 3 sets - 10 reps - 3-5'' hold  - Sit to Stand Without Arm Support  - 1 x daily - 7 x weekly - 2 sets - 10 reps  - Seated Long Arc Quad  - 1 x daily - 7 x weekly - 3 sets - 10 reps - 5'' hold  - Seated Knee Extension Stretch with Chair  - 1 x daily - 7 x weekly - 3 sets - 10 reps - 3'' hold  - Side Stepping with Resistance at Thighs and Counter Support  - 1 x daily - 7 x weekly - 3 sets - 10 reps  - Standing Hip Abduction with Resistance at Ankles and Counter Support  - 1 x daily - 7 x weekly - 3 sets - 10 reps  - Standing Hip Extension with Resistance at Ankles and Counter Support  - 1 x daily - 7 x weekly - 3 sets - 10 reps    Charges: 2 TE; 1 man  Total Timed Treatment: 45 min  Total Treatment Time: 45 min

## 2024-10-08 ENCOUNTER — OFFICE VISIT (OUTPATIENT)
Dept: PHYSICAL THERAPY | Age: 59
End: 2024-10-08
Attending: INTERNAL MEDICINE
Payer: COMMERCIAL

## 2024-10-08 ENCOUNTER — TELEPHONE (OUTPATIENT)
Dept: RHEUMATOLOGY | Facility: CLINIC | Age: 59
End: 2024-10-08

## 2024-10-08 ENCOUNTER — PATIENT MESSAGE (OUTPATIENT)
Dept: RHEUMATOLOGY | Facility: CLINIC | Age: 59
End: 2024-10-08

## 2024-10-08 ENCOUNTER — OFFICE VISIT (OUTPATIENT)
Dept: RHEUMATOLOGY | Facility: CLINIC | Age: 59
End: 2024-10-08
Payer: COMMERCIAL

## 2024-10-08 VITALS
WEIGHT: 189 LBS | HEIGHT: 71 IN | HEART RATE: 81 BPM | TEMPERATURE: 98 F | DIASTOLIC BLOOD PRESSURE: 68 MMHG | RESPIRATION RATE: 16 BRPM | SYSTOLIC BLOOD PRESSURE: 132 MMHG | OXYGEN SATURATION: 97 % | BODY MASS INDEX: 26.46 KG/M2

## 2024-10-08 DIAGNOSIS — M79.18 MYOFASCIAL PAIN DYSFUNCTION SYNDROME: Primary | ICD-10-CM

## 2024-10-08 DIAGNOSIS — Z98.890 S/P ARTHROSCOPY OF RIGHT KNEE: ICD-10-CM

## 2024-10-08 DIAGNOSIS — M15.0 PRIMARY OSTEOARTHRITIS INVOLVING MULTIPLE JOINTS: ICD-10-CM

## 2024-10-08 DIAGNOSIS — M54.12 CERVICAL RADICULOPATHY: ICD-10-CM

## 2024-10-08 DIAGNOSIS — M19.90 ARTHRITIS: ICD-10-CM

## 2024-10-08 PROCEDURE — 99214 OFFICE O/P EST MOD 30 MIN: CPT | Performed by: INTERNAL MEDICINE

## 2024-10-08 PROCEDURE — 97140 MANUAL THERAPY 1/> REGIONS: CPT

## 2024-10-08 PROCEDURE — 97110 THERAPEUTIC EXERCISES: CPT

## 2024-10-08 RX ORDER — GABAPENTIN 300 MG/1
CAPSULE ORAL
Qty: 90 CAPSULE | Refills: 1 | Status: SHIPPED | OUTPATIENT
Start: 2024-10-08

## 2024-10-08 NOTE — PROGRESS NOTES
Diagnosis:   Chondromalacia of right knee (M94.261)          Referring Provider: Jennifer Sandoval Date of Evaluation:    8/15/24    Precautions:  None Next MD visit:   none scheduled  Date of Surgery: n/a   Insurance Primary/Secondary: [employer] / N/A     # Auth Visits: 5              Subjective:   Pt explains he came from follow up appointment with MD. She referred him for MRI of shoulder.     Pain: 1/10      Objective:        AROM: (* denotes performed with pain)   Knee at IE 9/12/24 9/19/2024    Flexion: R 76; L 136   Extension: R -4; L 0 Flexion: R 131   Extension: R -2 Flexion: R 140           Assessment:  Shuttle press continued to be held secondary to pt reports of increased pain in shoulder when performing. Session focused on eccentric control of quad, with cues for slow and controlled descent. Challenged w/ increased weight w/ LAQ and SAQ maintaining good form throughout with no reports of increased discomfort. Due to insurance, pt is unable to be seen for both knee and shoulder concurrently. Discussed pt's progress with knee and plan to discharge knee in 2 session to allow shoulder evaluation. Pt agreeable to plan.      Goals:   (To be met in 12 visits)   Pt will improve knee extension ROM to 0 deg to allow proper heel strike during gait and terminal knee extension in stance - Progressing  Pt will improve knee AROM flexion to >125 degrees to improve ability to perform stair negotiation - Met  Pt will improve quad strength to 5/5 to ascend 1 flight of stairs reciprocally without UE assist - Progressing  Pt will increase hip and knee strength to grossly 4+/5 to be able to get up and down from the floor safely - Progressing   Pt will demonstrate increased hip ER/ABD strength to 4+/5 to perform stepping and squatting activities without excessive femoral IR/ADD- Progressing  Pt will improve SLS to >30s to improve safety and independence with gait on uneven surfaces such as grass - Progressing   Pt will be  independent and compliant with comprehensive HEP to maintain progress achieved in PT - Met   Pt will improve hip extension strength to >/= 4+/5 bilaterally to improve ability to negotiate stairs (New goal- established 9/12/24)       Plan: Continue w/ POC; see intervention grid for progressions   Date: 9/16/24  Tx#: 6/16 Date: 9/19/2024  Tx#: 7/16 Date : 9/23/24  TX # 8/16 Date : 10/3/2024  TX # 9/16 Date : 10/8/2024  TX # 10/16   Therex ( 40 min ) Therex (40 min) There ex ( 25 min ) Therex (25 min) Therex (25 min)   NU step x 6 min ,level 5 Recumbent bike x 6 min level 3 NU step x 6 min , level 5 Upright Bike L4  X 5' Upright Bike L4  X 5'   Supine :  R knee patella mobs in all planes   PROM R knee flex / ext with gentle end range stretch x 5 min   Supine :   R SLR with QS   2 x 10   R SLR with QS with ER   2 x 10   TKE with knee on bloater with 2 lbs   2 x 10     Standing :  B gastroc stretch on slant board   5 x 20  sec hold   B heel raises x 20  B toes raises x 20  TKE with RTB x 20   Reverse TKE with RTB x 20  Standing on air ex :  Alt hip abd with RTB 2 x 10   Alt hip ext with RTB   2 x 10   Side stepping with RTB  6 rounds     BOSU :  Alt lunges 2 x 10   Lateral lunges 2 x 10   Squats on 2 blue disks   2 x 10   Balancing on 2 blue discs :  A/P x 20  M/L x 20  Step down on 6 inch step 2 x 10  Supine :   SLR w/ QS 2# 2 x 10   SLR w/ QS w/ ER 2#  2 x 10   SAQ 2# 2 x 10   Standing :  B gastroc stretch on slant board 3 x 30'' hold  Shuttle :  B leg press with 75 lbs 2 x 10   B single leg press with 50 lbs   2 x 10  Calf Raises 50# x 10    Step down on 6 inch step x 10 RLE  Alt hip abd with BTB x 10 ea  Alt hip ext with BTB x 10 ea    Supine :  R hip ER/IR self stretch 10 reps x 10 sec hold each  TKE on knee on gray bolster with 1.5 lbs 3 x 10  SLR with QS with 1.5 lbs 2 x 10   SLR with ER with QS with 1.5 lbs   2 x 15     Standing :  B gastroc stretch on slant board   5 x 20 sec hold   B heel raises x 20  B toes  raises x 20  R knee step down ( with focus on eccentric control ) :  Step down on 4 inch step :  Forward 2 x 10   Lateral 2 x 10   Posterior 2 x 10   Sidestepping with RTB 2 rounds  Supine:  SAQ 2 lbs 2 x 10  SLR with QS with  2 lbs 2 x 10   Standing   B heel raises x 20  B toes raises x 20  Hip Abd BTB 20 x 2 ea  Hip Ext BTB 20 x 2 ea Supine:  ITB stretch w/ strap 3 x 30'' B  SAQ 2.5 lbs 2 x 10    Sitting:  LAQ 2.5# 2 x 10    Standing:  TKE with BTB x 15  Step down 4'' step lat 2 x 10   Side stepping GTB x 4 laps          Manual therapy x 20 min : Manual therapy (20 min) Manual (18 min)     STM / ISTM to medial R patella followed by L ITB FR   L patella tape to correct left patella lateral shift and tilt IASTM to medial R patella, distal quad, distal ITB, patellar ligament x 15'  FR to IRB x 2'   L patella tape to correct left patella lateral shift and tilt x 3' IASTM to medial R patella, distal quad, distal ITB, patellar ligament x 15'  R patella tape to correct left patella lateral shift and tilt x 3'   HEP:   Access Code: [email or web address]  URL: [email or web address]  Date: 09/12/2024  Prepared by: Randi Adam    Exercises  - Supine Short Arc Quad  - 1 x daily - 7 x weekly - 3 sets - 10 reps  - Seated Knee Flexion AAROM  - 1 x daily - 7 x weekly - 3 sets - 10 reps - 3'' hold  - Supine Heel Slide with Strap  - 1 x daily - 7 x weekly - 3 sets - 10 reps - 3-5'' hold  - Sit to Stand Without Arm Support  - 1 x daily - 7 x weekly - 2 sets - 10 reps  - Seated Long Arc Quad  - 1 x daily - 7 x weekly - 3 sets - 10 reps - 5'' hold  - Seated Knee Extension Stretch with Chair  - 1 x daily - 7 x weekly - 3 sets - 10 reps - 3'' hold  - Side Stepping with Resistance at Thighs and Counter Support  - 1 x daily - 7 x weekly - 3 sets - 10 reps  - Standing Hip Abduction with Resistance at Ankles and Counter Support  - 1 x daily - 7 x weekly - 3 sets - 10 reps  - Standing Hip Extension with Resistance at Ankles and  Counter Support  - 1 x daily - 7 x weekly - 3 sets - 10 reps    Charges: 1 man; 2 TE  Total Timed Treatment: 43 min  Total Treatment Time: 43 min

## 2024-10-08 NOTE — PROGRESS NOTES
[facility], [address] [place] [address]      Consult     Devin Urrutia Patient Status:  No patient class for patient encounter    1965 MRN [id number]   [facility], [address] [place] [address] Attending No att. providers found   Hosp Day # 0 PCP Braxton Rivas MD     Referring Provider: PCP    Reason for Consultation: Hand pain neuropathy spasm and chronic knee pain    Subjective:    Devin Urrutia is a 59 year old male with more frequent episodes of pain in hands and joints in general    More pain stiffness; no burning no achiness. Stiffness     Some pain and stiffness on and off in shoulders, elbows , wrists or hands    On and off  knee pain; low back; hip pain    Denies any major issues with her neck    Aleve sometimes (1-2 times a week) 30 percent (knees)     MRI knee (done) in ) tried cortisone and synvisc (injection) not helping at all    Around 2 years ago. 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 (No stress test)     History of smoking ( quit 25 years ago) 1 ppd x 15-20 years) (PFTS) pending (flovent) prohair)     States no urinary or bowel symptoms; bloody stools (colonoscopy was normal 3 yearse ago)     States no headaches jaw pain, vision changes    States no history of pericardial, pleural effusions    States no significant dry eyes or dry mouth (mild) (eyes checked once a year)     States no history of uveitis iritis scleritis    States no history of Raynaud's or digital ulcerations    States no major weight changes; night  sweats;     Wt Readings from Last 6 Encounters:   10/08/24 189 lb (85.7 kg)   09/24/24 186 lb (84.4 kg)   08/13/24 186 lb (84.4 kg)   08/01/24 183 lb (83 kg)   07/17/24 183 lb (83 kg)   06/12/24 186 lb (84.4 kg)     Denies any depression anxiety or insomnia ;  3 kids (22, 18, 16) no medical issues    Patient was seen as a new patient June 2024    Diagnosed with osteoarthritis of the knees multiple joints and also myofascial pain syndrome    Patient had nonrestorative sleep fatigue neuropathy baseline x-ray showed mild osteoarthritis    Patient states overall with physical therapy and out arthroscopic surgery of his knees by Ortho in between he has been doing fairly well if he maintains his exercises he also sees pulmonologist for his asthma which has been also stable    Started him on gabapentin which is uptitrated 300 mg at bedtime with overall improvement and somewhat improvement in his nonrestorative sleep and fatigue    He states now some occasional neuropathy of his right upper extremity with certain positions of his neck.  He has okay to proceed with an MRI to rule out cervical radiculitis.  She is continuing his therapy for his neck and spine and knees    Does feel benefit from the combination of gabapentin and meloxicam.  Reminded patient he needs to update labs today to monitor for drug toxicity from NSAIDs this was recently filled 14 tablets in August by his PCP    He is willing to proceed with MRI of the cervical neck and would be referred to pain management at this time for the suspicion of cervical radiculitis.  X-rays of the neck reviewed as well as other x-rays other than mild arthritis fairly unrevealing      History/Other:      Past Medical History:  Past Medical History:    Allergic rhinitis    Asthma (HCC)    High cholesterol    Hyperlipidemia    Visual impairment        Past Surgical History:   Past Surgical History:   Procedure Laterality Date    Arthroscopy of joint unlisted       Colonoscopy         Social History:  reports that he quit smoking about 27 years ago. His smoking use included cigarettes. He started smoking about 52 years ago. He has a 12.5 pack-year smoking history. He has been exposed to tobacco smoke. He has quit using smokeless tobacco. He reports that he does not drink alcohol and does not use drugs.    Family History:   Family History   Problem Relation Age of Onset    Heart Disorder Father     Hypertension Mother     Heart Disorder Mother     Asthma Mother        Allergies: No Known Allergies    Current Medications:  Current Outpatient Medications   Medication Sig Dispense Refill    gabapentin 300 MG Oral Cap One capsule at bedtime 90 capsule 1    amoxicillin clavulanate 875-125 MG Oral Tab Take 1 tablet by mouth 2 (two) times daily for 10 days. 20 tablet 0    Olopatadine HCl 0.2 % Ophthalmic Solution Apply 1 Application to eye 2 (two) times daily as needed. 2.5 mL 1    montelukast 10 MG Oral Tab Take 1 tablet (10 mg total) by mouth nightly. 90 tablet 3    alfuzosin ER 10 MG Oral Tablet 24 Hr Take 1 tablet (10 mg total) by mouth daily. 90 tablet 5    ondansetron (ZOFRAN) 4 mg tablet Take 1 tablet by mouth every 8 hours as needed for nausea. 8 tablet 0    Meloxicam 15 MG Oral Tab Take 1 tablet (15 mg total) by mouth daily. 14 tablet 1    fluticasone-umeclidin-vilant (TRELEGY ELLIPTA) 200-62.5-25 MCG/ACT Inhalation Aerosol Powder, Breath Activated Inhale 1 puff into the lungs daily. 60 each 5    budesonide 0.5 MG/2ML Inhalation Suspension Take 2 mL (0.5 mg total) by nebulization daily. 120 mL 5    atorvastatin 10 MG Oral Tab Take 1 tablet (10 mg total) by mouth nightly. 90 tablet 1    albuterol 108 (90 Base) MCG/ACT Inhalation Aero Soln Inhale 1 puff into the lungs every 6 (six) hours as needed. 1 each 1    gabapentin 100 MG Oral Cap Take 3 capsules (300 mg total) by mouth nightly. 100mg at bedtime x 1 week then increase to 200mg at bedtime x 1 week then 300mg at bedtime 90  capsule 2    Respiratory Therapy Supplies (NEBULIZER) Does not apply Device Please use every 6 hour as needed for Shortness of breath, wheezing 1 Device 0    traMADol 50 MG Oral Tab Please take 1 tablet every 6 hours as needed for pain. Max of 8 tablets per day. Collaborating - Dr. Jennifer Sandoval. (Patient not taking: Reported on 10/8/2024) 20 tablet 1    FLOVENT  MCG/ACT Inhalation Aerosol Inhale 2 puffs into the lungs 2 (two) times daily. (Patient not taking: Reported on 10/8/2024) 1 each 3      No current facility-administered medications for this visit.     (Not in a hospital admission)      Review of Systems:     Constitutional: Negative for chills, , fatigue, fever and unexpected weight change.    HENT: Negative for congestion, and mouth sores.    Eyes: Negative for photophobia, pain, redness and visual disturbance.    Respiratory: Negative for apnea, cough, chest tightness, shortness of breath, wheezing and stridor.    Cardiovascular: Negative for chest pain, palpitations and leg swelling.    Gastrointestinal: Negative for abdominal distention, abdominal pain, blood in stool, constipation, diarrhea and nausea.    Endocrine: Negative.     Genitourinary: Negative for decreased urine volume, difficulty urinating, dyspareunia, dysuria, flank pain, and frequency.    Musculoskeletal: + arthralgias, no gait problem and joint swelling.    Skin: Negative for color change, pallor and rash. No raynauds or digital ulcerations no sclerodactly.    Allergic/Immunologic: Negative.    Neurological: Negative for dizziness, tremors, seizures, syncope, speech difficulty, weakness, light-headedness, +numbness and no headaches.    Hematological: Does not bruise/bleed easily.    Psychiatric/Behavioral: Negative for confusion, decreased concentration, hallucinations, self-injury, sleep disturbance and suicidal ideas or depression.    Objective:   Vitals:    10/08/24 1021   BP: 132/68   Pulse: 81   Resp: 16   Temp: 98.1 °F (36.7  °C)          Constitutional: is oriented to person, place, and time. Appears well-developed and well-nourished. No distress.    HEENT: Normocephalic; EOMI; no jvd; no LAD; no oral or nasal ulcers.     Eyes: Conjunctivae and EOM are normal. Pupils are equal, round, and reactive to light.     Neck: Normal range of motion. No thyromegaly present.    Cardiovascular: RRR, no murmurs.    Lungs: Clear, Bilateral air entry, no wheezes.    Abdominal: Soft.    Musculoskeletal:    There is currently no information documented on the [facility]. Go to the Rheumatology activity and complete the [facility] joint exam.     Joint Exam 10/08/2024     No joint exam has been documented for this visit        Swollen: --     Tender: --         Right shoulder: Exhibits normal range of motion on abduction and internal rotation, no tenderness, no bony tenderness, no deformity, no laceration, no pain and no spasm.        Left shoulder: Exhibits normal range of motion on abduction and internal and external rotation.  no tenderness, no bony tenderness, no swelling, no effusion, no deformity, no pain, no spasm and normal strength.        Right elbow:  Exhibits normal range of motion, no swelling, no effusion and no deformity. No tenderness found. No medial epicondyle, no lateral epicondyle and no olecranon process tenderness noted. There are no contractures or tophi or nodules.        Left elbow:  Normal range of motion, no swelling, no effusion and no deformity. No medial epicondyle, no lateral epicondyle and no olecranon process tenderness noted. There are no contractures or tophi or nodules.        Right wrist:  Exhibits normal range of motion, no tenderness, no bony tenderness, no swelling, no effusion and no crepitus. Flexion and extension intact w/o limitation.        Left wrist: Exhibits normal range of motion, no tenderness, no bony tenderness, no swelling, no effusion, no crepitus and no deformity. Flexion and extension intact without  limitation.        Right hip: Exhibits normal range of motion, normal strength, no tenderness, no bony tenderness, no swelling and no crepitus.        Right hand: No synovitis of MCP,PIP or DIP joints; no Bouchards very small scattered Heberden nodules noted;  strength: 100%.  Mild squaring first CMC joint        Left hand: No synovitis of MCP,PIP or DIP joints; no Bouchards very small scattered Heberden nodules noted;  strength: 100%.  Mild squaring first CMC joint        Left hip: Exhibits normal range of motion, normal strength, no tenderness, no bony tenderness, No swelling and no crepitus.        Right knee: Exhibits normal range of motion, no swelling, no effusion, no ecchymosis, no deformity and no erythema. No tenderness found. No medial joint line, no lateral joint line, no MCL and no LCL tenderness noted. mod crepitation on flexion of knee and extension normal.        Left knee:  Exhibits normal range of motion, no swelling, no effusion, no ecchymosis and no erythema. No tenderness found. No medial joint line, no lateral joint line and no patellar tendon tenderness noted. mod crepitation on flexion of the knee. Extension intact and normal.        Right ankle: No swelling, no deformity. No tenderness. Dorsiflexion and plantar flexion intact without limitation in range of motion.        Left ankle: Exhibits no swelling. No tenderness. No lateral malleolus and no medial malleolus tenderness found. Achilles tendon normal. Achilles tendon exhibits no pain, no defect and normal Arreola's test results.  Dorsiflexion and plantar flexion intact without limitation in range of motion.        Cervical back: Exhibits normal range of motion, no tenderness, no bony tenderness, no swelling, + pain and no spasm.        Thoracic back: Exhibits normal range of motion, no tenderness, no bony tenderness and + spasm.        Lumbar back:  Exhibits normal range of motion, no tenderness, no bony tenderness, no pain and +  spasm.  Significant limitation forward flexion        Right foot: normal. There is normal range of motion, no tenderness, no bony tenderness, no crepitus and no laceration. There is no synovitis or tenderness of the MTP joints to palpation.  Bony enlargement first MTP joint        Left foot: normal. There is normal range of motion, no tenderness, no bony tenderness and no crepitus. There is no synovitis or tenderness of the MTP joints to palpation.  Bony enlargement first MTP joint    Lymphadenopathy: No submental, no submandibular, and no occipital adenopathy present, has no cervical adenopathy or axillary lympadenopathy.    Neurological: Alert and oriented. No focal motor or sensory abnormalities. Strength is 5/5 Upper Extremities/Lower Extremities proximally and distally.    Skin: Skin is warm, dry and intact.  No rashes no purpuric petechial lesions    Psychiatric: Normal behavior. Nonrestorsive sleep; no fatigue     Results:    Labs:      Lab Results   Component Value Date    WBC 7.9 08/15/2024    RBC 4.36 08/15/2024    HGB 13.2 08/15/2024    HCT 38.4 (L) 08/15/2024    MCV 88.1 08/15/2024    MCH 30.3 08/15/2024    MCHC 34.4 08/15/2024    RDW 13.5 08/15/2024    .0 08/15/2024       No components found for: \"RELY\", \"NMET\", \"MYEL\", \"PROMY\", \"JUICE\", \"ABSNEUTS\", \"ABSBANDS\", \"ABMM\", \"ABMY\", \"ABPM\", \"ABBL\"      Lab Results   Component Value Date     01/23/2024    K 4.1 01/23/2024    CO2 29.0 01/23/2024    BUN 20 01/23/2024    ALB 3.8 01/23/2024    AST 27 01/23/2024    ALT  01/23/2024      Comment:      Due to  backorder we are temporarily unable to offer hospital-based ALT testing at [facility].   If urgently needed, please order ALT test code [id number].   The new order will need a new venipuncture and will be sent to [facility] for testing.   The expected turnaround time will be within 24 hours.           No components found for: \"ESRWESTERGRN\"       Lab Results   Component Value Date    CRP  <0.40 08/15/2024         Lab Results   Component Value Date    CLARITY Clear 01/23/2024    UROBILINOGEN Normal 01/23/2024     @LABRCNTIP(RF,B12)@      [unfilled]    Component  Ref Range & Units 4/1/21  9:51 AM   Allergen A. Alternata  <0.10 kUA/L <0.10   Allergen Aspergillus Fumigatus  <0.10 kUA/L <0.10   Allergen Bermuda Grass  <0.10 kUA/L 0.20 High    Allergen Box Elder  <0.10 kUA/L <0.10   Allergen C. Herbarum  <0.10 kUA/L <0.10   Allergen Cat Dander  <0.10 kUA/L 12.00 High    Allergen Cockroach  <0.10 kUA/L 1.15 High    Allergen Common Pigweed  <0.10 kUA/L <0.10   Allergen Common Ragweed  <0.10 kUA/L <0.10   Allergen [place] Tree  <0.10 kUA/L 0.12 High    Allergen D. Farinae  <0.10 kUA/L <0.10   Allergen D. Pteronyssinus  <0.10 kUA/L <0.10   Allergen Dog Dander  <0.10 kUA/L 0.52 High    Allergen Elm Tree  <0.10 kUA/L 0.20 High    Allergen Simons Elder  <0.10 kUA/L 0.21 High    Allergen Mouse Epithelium  <0.10 kUA/L <0.10   Allergen [place]  <0.10 kUA/L <0.10   Allergen Mucor Racemosus  <0.10 kUA/L <0.10   Allergen Lupton  <0.10 kUA/L <0.10   Allergen [place] Tree  <0.10 kUA/L 0.10 High    Allergen Pecan/Hickory  <0.10 kUA/L 0.14 High    Allergen Penicillium Notatum  <0.10 kUA/L <0.10   Allergen Russian Thistle  <0.10 kUA/L <0.10   Allergen [place]  <0.10 kUA/L 0.14 High    Allergen Aaron Grass  <0.10 kUA/L 1.05 High    Allergen [place]land Tree  <0.10 kUA/L 0.16 High    Allergen [place]  <0.10 kUA/L 0.92 High    Immunoglobulin E  2.00 - 214.00 kU/L 277.00 High    Resulting Agency [facility] ([facility])              Narrative  Performed by: [facility] ([facility])  Reference Ranges:  Specific IgE Class    Class 0      <0.10           No significant level detected    Class 0/1    0.1-0.34        Clinical relevance undetermined    Class 1      0.35-0.70       Low    Class 2      0.71-3.50       Moderate    Class 3      3.51-17.50      High    Class 4      17.51-50.00     Very High    Class 5      50.1-100.00      Very High    Class 6      >100.00         Very High        omponent  Ref Range & Units 2/28/20 11:23 AM   Uric Acid  3.5 - 7.2 mg/dL 5.2     Component  Ref Range & Units 12/23/19 10:44 AM   Tanya Screen  Negative Negative   Comment: Negative for the following EN     Imaging:  No results found.    Narrative   PROCEDURE:  XR HAND (MIN 3 VIEWS) BILAT (CPT=73130)     TECHNIQUE:  A total of 6 views were obtained. Three views of the right hand and three views of the left hand.     COMPARISON:  None.     INDICATIONS:  M79.642 Pain in both hands M79.641 Pain in both hands     PATIENT STATED HISTORY: (As transcribed by Technologist)  Pt c/o pain on the dorsal side of both hand for 10 days, he also has h/o arthritis.         FINDINGS:  No significant erosive changes are identified.  No acute displaced osseous fracture is seen.  No significant soft tissue swelling is identified.  Mild osteoarthritis changes are seen at the bilateral 1st carpometacarpal joints, left MCP joint  and left 2nd MCP joint.  There are minimal scattered degenerative changes within the DIP joints.                   Impression   CONCLUSION:  No acute osseous abnormality is evident.  Mild degenerative changes in the hands.        LOCATION:  [facility]        Narrative   PROCEDURE:  MRI KNEE, LEFT (YXA=40693)     LOCATION:  [facility]       COMPARISON:  None.     INDICATIONS:  G89.29 Chronic pain of left knee M25.562 Chronic pain of left knee S80.02XD Contusion of left knee, subsequent encounter     TECHNIQUE:  Axial, coronal, and sagittal proton density with and without fat saturation images were obtained.     PATIENT STATED HISTORY: (As transcribed by Technologist)  The patient reports a fall five months ago. Now with bilateral medial knee pain.         FINDINGS:    LIGAMENTS:          The ACL, PCL, patellar retinacula, and collateral ligament complexes are intact.  MENISCI:            The medial and lateral menisci are intact without evidence of tear or  significant degenerative change.  TENDONS:            The tendinous insertions about the knee are intact without significant tendinosis or tears.  MUSCULATURE:        No evidence of strain, edema, or atrophy.  BONY COMPARTMENTS:  There is no evidence of acute osseous injuries.  There is mild patellofemoral joint osteoarthritis.  There is mild chondromalacia of the femoral tibial compartments.  SYNOVIUM:           There is a mild suprapatellar knee joint effusion.  There is suggestion of 2 ossified intra-articular bodies within the posterior medial joint space, measuring 5 mm and 4 mm.  These are best seen on sagittal fat sat PD images 16 and  17 of series 6.                      Impression   CONCLUSION:    1. Mild patellofemoral joint osteoarthritis and mild chondromalacia of the femoral tibial compartments.  Associated mild joint effusion and suggestion of 2 ossified intra-articular bodies within the posterior medial joint space measuring 5 and 4 mm  respectively.  2. No evidence of meniscal injuries.    3. No acute process.           Narrative   PROCEDURE:  MRI KNEE, RIGHT (DRZ=80463)     LOCATION:  [facility]       COMPARISON:  [facility], XR, XR KNEE (1 OR 2 VIEWS), RIGHT (CPT=73560), 6/25/2022, 9:27 AM.     INDICATIONS:  Status post fall 5 months ago with persisting knee pain.     TECHNIQUE:  Axial, coronal, and sagittal proton density with and without fat saturation images were obtained.     PATIENT STATED HISTORY: (As transcribed by Technologist)  The patient reports a fall five months ago. Now with bilateral medial knee pain.         FINDINGS:    LIGAMENTS:          The ACL is intact and unremarkable.  The PCL reveals thickening and intermediate grade signal involving the proximal to mid fibers consistent with a moderate grade PCL sprain.  The collateral ligaments are intact with thickening of  the proximal, superficial MCL without abnormal signal, consistent with low-grade chronic sprain.  Patellofemoral  ligaments are unremarkable.  MENISCI:            The medial and lateral menisci are intact without evidence of tear or significant degenerative change.  TENDONS:            There is mild distal quadriceps tendinosis and enthesopathy.  No high-grade tendinopathy noted.  MUSCULATURE:        No evidence of strain, edema, or atrophy.  BONY COMPARTMENTS:  There is evidence of mild tricompartmental osteoarthritis with multiple small foci of low to moderate grade articular cartilage loss within all 3 compartments of the knee.  SYNOVIUM:           Mild joint effusion.                      Impression   CONCLUSION:    1. There is a moderate grade PCL sprain involving the proximal to mid fibers.  2. Mild tricompartmental osteoarthritis and mild joint effusion.  3. Low-grade chronic sprain of the proximal, superficial aspect of the MCL.        Dictated by (CST): Gianna Kennedy DO on 10/24/2022 at 11:57 AM      Finalized by (CST): Gianna Kennedy DO on 10/24/2022 at 12:01 PM     PROCEDURE:  XR FOOT, COMPLETE (MIN 3 VIEWS), RIGHT (CPT=73630)     TECHNIQUE:  AP, oblique, and lateral views were obtained.     COMPARISON:  None.     INDICATIONS:  M79.671 Pain in right foot     PATIENT STATED HISTORY: (As transcribed by Technologist)  Patient states pain to lateral side of right foot and fifth digit for one day. Patient is having difficulty walking. Previous hairline fracture near fifth digit 18 years ago.          Impression   CONCLUSION:    No fracture, dislocation, deformity, other acute process.  Mild degenerative disc changes at the 1st metatarsophalangeal joint with mild joint narrowing and osteophytes.  No hallux valgus or significant bunion.  No soft tissue  abnormalities. Plantar calcaneal spur, and spurring at the insertion of the Achilles tendon onto the calcaneus.      Dictated by: Renard Cruz MD on 2/28/2020 at 12:00      Approved by: Renard Cruz MD on 2/28/2020 at 12:0       Assessment & Plan:      59-year-old  gentleman comes in for further evaluation for arthralgias primarily generalized arthralgias with chronic knee pain    Moderate to severe arthritis of the knees with chronic knee pain followed by Ortho   Mild osteoarthritis multiple joints  Hand pain could be related to neuropathy carpal tunnel syndrome  Lumbar cervical spasm  BILateral hamstring tightness  Myofascial pain syndrome with muscle spasm tightness nonrestorative sleep fatigue neuropathy    Patient has no obvious synovitis on exam or history concerning for connective tissue disease    Continue gabapentin 300 mg at bedtime with overall improvement in neuropathy nonrestorative sleep and fatigue    With signs symptoms of cervical radiculitis we will get MRI of the cervical neck to further evaluate this.  Patient states full cardiac workup was unrevealing with PCP    He states also some improvement on meloxicam 15 mg daily.  Reminded patient get updated CBC CMP to monitor for drug toxicity    Sent prescription given to him August 2024 by PCP        His chronic shortness of breath asthma history of smoking COPD cardiac workup was unrevealing and he does follow-up with the pulmonologist on breathing medications and overall improvement and stability    X-rays of the cervical thoracic lumbar spine showing mild osteoarthritis    Patient open to proceeding with MRI of the cervical spine and pain management referral at this time    Continue physical therapy in the meantime      Continue meloxicam 15 mg daily risk of NSAIDs discussed asked him to not take other NSAIDs with this    X-rays were unrevealing of the hands consider EMG to rule out carpal tunnel syndrome or cervical disc disease and neuropathy related to this.    Extensive autoimmune workup unrevealing    He has failed Synvisc injections and steroid injections from Ortho in the past.  He likely will need to go back to them at some point depending on the results of the imaging and testing for surgical  options.  Recently had left knee arthroscopic surgery with no improvement by Ortho    Continue physical therapy and follow-up with orthopedic surgeon    Education and counseling provided to patient.  Instructed patient to call my office or seek medical attention immediately if symptoms worsen. Risks and side effects of medications and diagnosis discussed in detail and patient was given written information on new prescribed medications.    Return to clinic:  Return in about 6 months (around 4/8/2025).    Morena Olguin MD  6/12/2024

## 2024-10-08 NOTE — TELEPHONE ENCOUNTER
If patient wants me to take over mobic he just needs updated cbc and cmp    He needs this for MRI anyways    Sorry forgot to ask if he was still taking this.     Or needs refill

## 2024-10-09 RX ORDER — MELOXICAM 15 MG/1
15 TABLET ORAL DAILY
Qty: 90 TABLET | Refills: 0 | Status: SHIPPED | OUTPATIENT
Start: 2024-10-09

## 2024-10-09 NOTE — PROGRESS NOTES
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 Has stiffness in the morning when he wakes up but once he is moving it feels much better.    Pain: 1.5/10      Objective:      AROM: (* denotes performed with pain)   Knee at IE 9/12/24 9/19/2024    Flexion: R 76; L 136   Extension: R -4; L 0 Flexion: R 131   Extension: R -2 Flexion: R 140           Assessment:  Shuttle press continued to be held secondary to pt reports of increased pain in shoulder when performing. Pt challenged with hip strengthening and increased resistance with BLE strengthening activities this session. Able to maintain good form and denying increased discomfort throughout. Initiated wall sit to promote eccentric control of quads. Provided education on necessity of continuing HEP to maintain strength and stability in knee. Pt inquired if it is beneficial to utilize elliptical at home. Advised elliptical and recumbent bike are okay to utilize but to avoid high impact cardio such as running. Pt verbalizing understanding. Discharge to be performed next session.       Goals:   (To be met in 12 visits)   Pt will improve knee extension ROM to 0 deg to allow proper heel strike during gait and terminal knee extension in stance - Progressing  Pt will improve knee AROM flexion to >125 degrees to improve ability to perform stair negotiation - Met  Pt will improve quad strength to 5/5 to ascend 1 flight of stairs reciprocally without UE assist - Progressing  Pt will increase hip and knee strength to grossly 4+/5 to be able to get up and down from the floor safely - Progressing   Pt will demonstrate increased hip ER/ABD strength to 4+/5 to perform  stepping and squatting activities without excessive femoral IR/ADD- Progressing  Pt will improve SLS to >30s to improve safety and independence with gait on uneven surfaces such as grass - Progressing   Pt will be independent and compliant with comprehensive HEP to maintain progress achieved in PT - Met   Pt will improve hip extension strength to >/= 4+/5 bilaterally to improve ability to negotiate stairs (New goal- established 9/12/24)       Plan: Discharge next session   Date: 9/16/24  Tx#: 6/16 Date: 9/19/2024  Tx#: 7/16 Date : 9/23/24  TX # 8/16 Date : 10/3/2024  TX # 9/16 Date : 10/8/2024  TX # 10/16 Date : 10/10/2024  TX # 11/16   Therex ( 40 min ) Therex (40 min) There ex ( 25 min ) Therex (25 min) Therex (25 min) Therex (23 min)   NU step x 6 min ,level 5 Recumbent bike x 6 min level 3 NU step x 6 min , level 5 Upright Bike L4  X 5' Upright Bike L4  X 5' Upright Bike L4  X 5'   Supine :  R knee patella mobs in all planes   PROM R knee flex / ext with gentle end range stretch x 5 min   Supine :   R SLR with QS   2 x 10   R SLR with QS with ER   2 x 10   TKE with knee on bloater with 2 lbs   2 x 10     Standing :  B gastroc stretch on slant board   5 x 20  sec hold   B heel raises x 20  B toes raises x 20  TKE with RTB x 20   Reverse TKE with RTB x 20  Standing on air ex :  Alt hip abd with RTB 2 x 10   Alt hip ext with RTB   2 x 10   Side stepping with RTB  6 rounds     BOSU :  Alt lunges 2 x 10   Lateral lunges 2 x 10   Squats on 2 blue disks   2 x 10   Balancing on 2 blue discs :  A/P x 20  M/L x 20  Step down on 6 inch step 2 x 10  Supine :   SLR w/ QS 2# 2 x 10   SLR w/ QS w/ ER 2#  2 x 10   SAQ 2# 2 x 10   Standing :  B gastroc stretch on slant board 3 x 30'' hold  Shuttle :  B leg press with 75 lbs 2 x 10   B single leg press with 50 lbs   2 x 10  Calf Raises 50# x 10    Step down on 6 inch step x 10 RLE  Alt hip abd with BTB x 10 ea  Alt hip ext with BTB x 10 ea    Supine :  R hip ER/IR self stretch 10  reps x 10 sec hold each  TKE on knee on gray bolster with 1.5 lbs 3 x 10  SLR with QS with 1.5 lbs 2 x 10   SLR with ER with QS with 1.5 lbs   2 x 15     Standing :  B gastroc stretch on slant board   5 x 20 sec hold   B heel raises x 20  B toes raises x 20  R knee step down ( with focus on eccentric control ) :  Step down on 4 inch step :  Forward 2 x 10   Lateral 2 x 10   Posterior 2 x 10   Sidestepping with RTB 2 rounds  Supine:  SAQ 2 lbs 2 x 10  SLR with QS with  2 lbs 2 x 10   Standing   B heel raises x 20  B toes raises x 20  Hip Abd BTB 20 x 2 ea  Hip Ext BTB 20 x 2 ea Supine:  ITB stretch w/ strap 3 x 30'' B  SAQ 2.5 lbs 2 x 10    Sitting:  LAQ 2.5# 2 x 10    Standing:  TKE with BTB x 15  Step down 4'' step lat 2 x 10   Side stepping GTB x 4 laps      Supine:  ITB stretch w/ strap 3 x 30'' B    Sitting:  LAQ 3# 2 x 10    Standing:  TKE with BTB 15 x 2  Step down 6'' step  2 x 10   Wall sit 30'' x 2     Manual therapy x 20 min : Manual therapy (20 min) Manual (18 min) Manual (18 min)     STM / ISTM to medial R patella followed by L ITB FR   L patella tape to correct left patella lateral shift and tilt IASTM to medial R patella, distal quad, distal ITB, patellar ligament x 15'  FR to IRB x 2'   L patella tape to correct left patella lateral shift and tilt x 3' IASTM to medial R patella, distal quad, distal ITB, patellar ligament x 15'  R patella tape to correct left patella lateral shift and tilt x 3'k IASTM to medial R patella, distal quad, distal ITB, patellar ligament x 15'  R patella tape to correct left patella lateral shift and tilt x 3'   HEP:   Access Code: [email or web address]  URL: [email or web address]  Date: 09/12/2024  Prepared by: Randi Adam    Exercises  - Supine Short Arc Quad  - 1 x daily - 7 x weekly - 3 sets - 10 reps  - Seated Knee Flexion AAROM  - 1 x daily - 7 x weekly - 3 sets - 10 reps - 3'' hold  - Supine Heel Slide with Strap  - 1 x daily - 7 x weekly - 3 sets - 10 reps -  3-5'' hold  - Sit to Stand Without Arm Support  - 1 x daily - 7 x weekly - 2 sets - 10 reps  - Seated Long Arc Quad  - 1 x daily - 7 x weekly - 3 sets - 10 reps - 5'' hold  - Seated Knee Extension Stretch with Chair  - 1 x daily - 7 x weekly - 3 sets - 10 reps - 3'' hold  - Side Stepping with Resistance at Thighs and Counter Support  - 1 x daily - 7 x weekly - 3 sets - 10 reps  - Standing Hip Abduction with Resistance at Ankles and Counter Support  - 1 x daily - 7 x weekly - 3 sets - 10 reps  - Standing Hip Extension with Resistance at Ankles and Counter Support  - 1 x daily - 7 x weekly - 3 sets - 10 reps    Charges: 1 man; 2 TE  Total Timed Treatment: 41 min  Total Treatment Time: 41 min

## 2024-10-10 ENCOUNTER — OFFICE VISIT (OUTPATIENT)
Dept: PHYSICAL THERAPY | Age: 59
End: 2024-10-10
Attending: INTERNAL MEDICINE
Payer: COMMERCIAL

## 2024-10-10 PROCEDURE — 97110 THERAPEUTIC EXERCISES: CPT

## 2024-10-10 PROCEDURE — 97140 MANUAL THERAPY 1/> REGIONS: CPT

## 2024-10-14 ENCOUNTER — APPOINTMENT (OUTPATIENT)
Dept: PHYSICAL THERAPY | Age: 59
End: 2024-10-14
Attending: INTERNAL MEDICINE
Payer: COMMERCIAL

## 2024-11-13 ENCOUNTER — TELEPHONE (OUTPATIENT)
Facility: CLINIC | Age: 59
End: 2024-11-13

## 2024-11-13 ENCOUNTER — OFFICE VISIT (OUTPATIENT)
Dept: PAIN CLINIC | Facility: CLINIC | Age: 59
End: 2024-11-13
Payer: COMMERCIAL

## 2024-11-13 VITALS
SYSTOLIC BLOOD PRESSURE: 110 MMHG | BODY MASS INDEX: 27 KG/M2 | WEIGHT: 190 LBS | HEART RATE: 71 BPM | DIASTOLIC BLOOD PRESSURE: 70 MMHG

## 2024-11-13 DIAGNOSIS — M47.812 CERVICAL SPONDYLOSIS: ICD-10-CM

## 2024-11-13 DIAGNOSIS — M54.12 CERVICAL RADICULITIS: Primary | ICD-10-CM

## 2024-11-13 PROCEDURE — 99214 OFFICE O/P EST MOD 30 MIN: CPT | Performed by: PHYSICIAN ASSISTANT

## 2024-11-13 NOTE — PROGRESS NOTES
Patient: Devin Urrutia  Medical Record Number: [id number]  Site: [facility]  Referring Physician:  Morena Olguin  PCP: Dr Ora Rivas    Dear Dr. Olguin:    Thank you very much for requesting this consultation. I had the opportunity to evaluate and initiate care for your patient today, as per your request.    HISTORY OF CHIEF COMPLAINT:      Devin Urrutia is a 59 year old male, who complains of diffuse pain, though is here today to address his radiating right shoulder and UE pain/tingling.    Patient is here today at the request of rheumatology with pain in above-described distribution that is been atraumatically present since 6/2024.  Predating this, he has had multiple various orthopaedic complaints, for which, he is under the care of ortho.  After onset of UE pain, he was seen by PCP.  Sent to rheumatology, and was sent for rheum workup which was negative.  Sent for XR C spine, which did show some degenerative changes, and was sent to PT.  Does not feel that this helped the pain, and if anything, states that he is just \"more tired.\"  Sent for MRI C spine, and here for further options.  He has not discussed his shoulder issues with ortho.      VAS Pain Score:  2-3/10    Hand Dominance: right  Loss of dexterity: No  Dropping things: No    Aggravating Factors: Relieving Factors:   Computer work  Changing positions      Past Treatment Attempted/Patient’s Response:  As above     Past Medical History:    Allergic rhinitis    Asthma (HCC)    High cholesterol    Hyperlipidemia    Visual impairment      Past Surgical History:   Procedure Laterality Date    Arthroscopy of joint unlisted      Colonoscopy        Family History   Problem Relation Age of Onset    Heart Disorder Father     Hypertension Mother     Heart Disorder Mother     Asthma Mother       Social History     Socioeconomic History    Marital status:    Tobacco Use    Smoking status: Former     Current packs/day: 0.00     Average  packs/day: 0.5 packs/day for 25.0 years (12.5 ttl pk-yrs)     Types: Cigarettes     Start date:      Quit date:      Years since quittin.8     Passive exposure: Past    Smokeless tobacco: Former    Tobacco comments:     N   Vaping Use    Vaping status: Never Used   Substance and Sexual Activity    Alcohol use: Never    Drug use: Never   Other Topics Concern    Caffeine Concern Yes     Comment: occassionally    Exercise Yes     Comment: every other day      Current Medications:  Current Outpatient Medications   Medication Sig Dispense Refill    Meloxicam 15 MG Oral Tab Take 1 tablet (15 mg total) by mouth daily. 90 tablet 0    gabapentin 300 MG Oral Cap One capsule at bedtime 90 capsule 1    Olopatadine HCl 0.2 % Ophthalmic Solution Apply 1 Application to eye 2 (two) times daily as needed. 2.5 mL 1    montelukast 10 MG Oral Tab Take 1 tablet (10 mg total) by mouth nightly. 90 tablet 3    alfuzosin ER 10 MG Oral Tablet 24 Hr Take 1 tablet (10 mg total) by mouth daily. 90 tablet 5    traMADol 50 MG Oral Tab Please take 1 tablet every 6 hours as needed for pain. Max of 8 tablets per day. Collaborating - Dr. Jennifer Sandoval. 20 tablet 1    ondansetron (ZOFRAN) 4 mg tablet Take 1 tablet by mouth every 8 hours as needed for nausea. 8 tablet 0    fluticasone-umeclidin-vilant (TRELEGY ELLIPTA) 200-62.5-25 MCG/ACT Inhalation Aerosol Powder, Breath Activated Inhale 1 puff into the lungs daily. 60 each 5    budesonide 0.5 MG/2ML Inhalation Suspension Take 2 mL (0.5 mg total) by nebulization daily. 120 mL 5    atorvastatin 10 MG Oral Tab Take 1 tablet (10 mg total) by mouth nightly. 90 tablet 1    albuterol 108 (90 Base) MCG/ACT Inhalation Aero Soln Inhale 1 puff into the lungs every 6 (six) hours as needed. 1 each 1    gabapentin 100 MG Oral Cap Take 3 capsules (300 mg total) by mouth nightly. 100mg at bedtime x 1 week then increase to 200mg at bedtime x 1 week then 300mg at bedtime 90 capsule 2    FLOVENT   MCG/ACT Inhalation Aerosol Inhale 2 puffs into the lungs 2 (two) times daily. 1 each 3    Respiratory Therapy Supplies (NEBULIZER) Does not apply Device Please use every 6 hour as needed for Shortness of breath, wheezing 1 Device 0        Functional Assessment: Patient reports that they are able to complete all of their ADL's such as eating, bathing, using the toilet, dressing and getting up from a bed or a chair independently.    Work History:  The patient currently is small business owner.    REVIEW OF SYSTEMS:   10 point review of systems is otherwise negative,unless otherwise in HPI.      Radiology/Lab Test Reviewed:  XR C spine:    FINDINGS:  No acute fracture or subluxation in the cervical spine.  Ventral bridging osteophytes in the mid to lower cervical spine.  Minimal degenerative disc space loss in the lower cervical spine.  Nonspecific soft tissue calcification projecting along the spinous processes of C5 and C6.  Straightening of the normal cervical lordosis with anatomic alignment.  Vertebral body heights are well-maintained.  Scattered mild facet arthropathy.  C1-2 articulation intact.  Normal mineralization.     CBC:    Lab Results   Component Value Date    WBC 7.9 08/15/2024    WBC 7.2 01/23/2024    WBC 9.5 02/07/2023   No results found for: \"HEMOGLOBIN\"  Lab Results   Component Value Date    .0 08/15/2024    .0 01/23/2024    .0 02/07/2023         PHYSICAL EXAMIMATION   PHYSICAL EXAMINATION: Devin Urrutia is a 59 year old male who is observed sitting comfortably on a chair in the exam room alert and oriented times three. He looks consistent with his stated age.    Ht Readings from Last 1 Encounters:   10/08/24 71\"     Wt Readings from Last 1 Encounters:   10/08/24 189 lb (85.7 kg)     The patient is well developed, well nourished, normal body habitus, well muscled. He moves independently from sitting to standing with ease.       Coordination:  Well coordinated; able to engage in  rapid alternating movements bilateral upper extremities    Tandem Walk: Able    ROM Cervical Spine:  See chart below:  Motion Right (+ or -) Left (+ or -)   Cervical flexion + -   Cervical extension - -   Cervical lateral bending - -   Cervical rotation + -     Integument:  Skin over area of cervical spine intact; no erythema, rashes, excoriations, lesions noted    Palpation:  See chart below:  Palpation of Right (+ or -) Left (+ or -)   Cervical Facets - -   Thoracic Facets - -   Paraspinal - -   Trapezius - -   Scapula - -   Occipital - -     DTR:  DTR grossly intact throughout bilateral upper extremities, 2/4 throughout    Strength:  Strength of bilateral upper extremities grossly intact; 5/5 throughout    Sensation:  Normal sensation noted to light touch and pressure throughout bilateral upper extremities.    Tests:  Test Right (+ or -) Left (+ or -)   Spurling - -   Davis’s Test     Clonus       HEAD/NECK: Head is normocephalic, neck supple  EYES: EOMI, ADIA  LYMPH EXAM: There is no lymph edema in either lower extremity.  VASCULAR EXAM: Radial pulses are normal bilaterally, with good distal perfusion. No clubbing or cyanosis.  HEART: normal, regular, S1 and S2  LUNGS: CTA  MUSCULOSKELETAL: Smooth, pain-free ROM to bilateral shoulders,elbows, wrists and digits.    Do you have any known blood/bleeding disorders?  No  Does patient currently take blood thinners?   None  Does patient currently take any antibiotics?   No    Patient is currently on pain medications:  No  Reason pain medications are prescribed: N/A  Pain medications are prescribed by: N/A  [place] Prescription Monitoring review: N/A  DIRE: N/A  Treatment decision: N/A    MEDICAL DECISION MAKING:   Impression: Cervical radiculitis, cervical spondylosis    Right shoulder and radiating upper extremity pain with numbness and tingling in the setting of x-ray evidence of degenerative changes of the cervical spine.  Symptoms progressively present for the  past 5 to 6 months, and has failed time, NSAIDs, Neurontin, physical therapy with HEP.  Has been sent for an MRI of the cervical spine by his rheumatologist, though has yet to schedule this.  On exam, does have pain with range of motion cervical spine, specifically flexion and right cervical rotation.  No focal sensory/motor deficits.  Negative Spurling's.    Will asked that he obtain MRI of the C-spine as ordered by rheumatology.  If this shows right cervical stenosis, could consider PARMJIT's for diagnostic/therapeutic purposes.  If MRI of the cervical spine is relatively benign, could have him evaluated by Ortho for possible shoulder involvement.    Plan: MRI C-spine.  Recommendations to follow based on findings.    The patient indicates understanding of these issues and agrees to the plan.      Thank you very much.     Respectfully yours,    WAYNE Chilel

## 2024-11-13 NOTE — PROGRESS NOTES
Subjective:   Patient ID: Devin Urrutia is a 59 year old male.    HPI    History/Other:   Review of Systems  Current Outpatient Medications   Medication Sig Dispense Refill    Meloxicam 15 MG Oral Tab Take 1 tablet (15 mg total) by mouth daily. 90 tablet 0    gabapentin 300 MG Oral Cap One capsule at bedtime 90 capsule 1    Olopatadine HCl 0.2 % Ophthalmic Solution Apply 1 Application to eye 2 (two) times daily as needed. 2.5 mL 1    montelukast 10 MG Oral Tab Take 1 tablet (10 mg total) by mouth nightly. 90 tablet 3    alfuzosin ER 10 MG Oral Tablet 24 Hr Take 1 tablet (10 mg total) by mouth daily. 90 tablet 5    traMADol 50 MG Oral Tab Please take 1 tablet every 6 hours as needed for pain. Max of 8 tablets per day. Collaborating - Dr. Jenniefr Sandoval. (Patient not taking: Reported on 10/8/2024) 20 tablet 1    ondansetron (ZOFRAN) 4 mg tablet Take 1 tablet by mouth every 8 hours as needed for nausea. 8 tablet 0    fluticasone-umeclidin-vilant (TRELEGY ELLIPTA) 200-62.5-25 MCG/ACT Inhalation Aerosol Powder, Breath Activated Inhale 1 puff into the lungs daily. 60 each 5    budesonide 0.5 MG/2ML Inhalation Suspension Take 2 mL (0.5 mg total) by nebulization daily. 120 mL 5    atorvastatin 10 MG Oral Tab Take 1 tablet (10 mg total) by mouth nightly. 90 tablet 1    albuterol 108 (90 Base) MCG/ACT Inhalation Aero Soln Inhale 1 puff into the lungs every 6 (six) hours as needed. 1 each 1    gabapentin 100 MG Oral Cap Take 3 capsules (300 mg total) by mouth nightly. 100mg at bedtime x 1 week then increase to 200mg at bedtime x 1 week then 300mg at bedtime 90 capsule 2    FLOVENT  MCG/ACT Inhalation Aerosol Inhale 2 puffs into the lungs 2 (two) times daily. (Patient not taking: Reported on 10/8/2024) 1 each 3    Respiratory Therapy Supplies (NEBULIZER) Does not apply Device Please use every 6 hour as needed for Shortness of breath, wheezing 1 Device 0     Allergies:Allergies[1]    Objective:   Physical Exam    Assessment  & Plan:   No diagnosis found.    No orders of the defined types were placed in this encounter.      Meds This Visit:  Requested Prescriptions      No prescriptions requested or ordered in this encounter       Imaging & Referrals:  None  Location of Pain: all over    Date Pain Began: 3 years          Work Related:   No        Receiving Work Comp/Disability:   No    Numeric Rating Scale:  Pain at Present:  3                                                                                                            (No Pain) 0  to  10 (Worst Pain)                 Minimum Pain:   3  Maximum Pain  5    Distribution of Pain:    bilateral    Quality of Pain:   numbness and sharp/stabbing    Origin of Pain:    Degenerative and Disease related    Aggravating Factors:    Walking    Past Treatments for Current Pain Condition:   Physical Therapy and Other steroid injection    Prior diagnostic testing for your pain:  xray, mri          [1] No Known Allergies

## 2024-11-14 NOTE — TELEPHONE ENCOUNTER
Called patient and left a detailed message letting patient know that once he scheduled the MRI the auth went to the prior auth team. Informed patient that the prior auth team will reach out to the patient and let him know if it was approved or not. Patient was asked to call the office back with any questions he may have.

## 2024-11-18 ENCOUNTER — TELEPHONE (OUTPATIENT)
Dept: RHEUMATOLOGY | Facility: CLINIC | Age: 59
End: 2024-11-18

## 2024-11-18 NOTE — TELEPHONE ENCOUNTER
Pt called office regarding MRI, received info that more info was required in order to approve his imaging. Explained that referral team is working on this approval.

## 2024-11-22 ENCOUNTER — TELEPHONE (OUTPATIENT)
Facility: CLINIC | Age: 59
End: 2024-11-22

## 2024-11-22 ENCOUNTER — TELEMEDICINE (OUTPATIENT)
Dept: FAMILY MEDICINE CLINIC | Facility: CLINIC | Age: 59
End: 2024-11-22
Payer: COMMERCIAL

## 2024-11-22 DIAGNOSIS — M79.671 FOOT PAIN, RIGHT: Primary | ICD-10-CM

## 2024-11-22 DIAGNOSIS — L60.9 RIDGED NAILS: ICD-10-CM

## 2024-11-22 NOTE — TELEPHONE ENCOUNTER
Called pt, pt states he scheduled an appt with Dr. Leonardo through [facility]. Pt states he received a call from our office. Would like to see if video appt was available.

## 2024-11-22 NOTE — PROGRESS NOTES
Subjective:   Patient ID: Devin Urrutia is a 59 year old male.    HPI  Mr. Urrutia is a pleasant 59-year-old gentleman with history of asthma, hypothyroidism, hyperlipidemia, arthritis presenting for video visit today.  Over the past 5 to 6 weeks he had noticed that his nails including his fingers and toes bilaterally has noted lines which are grooved.  These are longitudinal in nature.  No discoloration that he notes.  No thickening that he notes to.  No pain or swelling.  He denies shortness of breath, chest pain, fever.  He has been taking his medications for his asthma compliantly.  He also has been experiencing right foot pain and localizes on the plantar aspect mid region especially when he steps on it.  No trauma that he knows of.  No numbness or tingling.    I had reviewed past medical and family histories together with allergy and medication lists documented.    History/Other:   Review of Systems   Constitutional:  Negative for fatigue and fever.   HENT:  Negative for sore throat and trouble swallowing.    Respiratory:  Negative for cough and shortness of breath.    Cardiovascular:  Negative for chest pain and palpitations.   Gastrointestinal:  Negative for abdominal pain, diarrhea, nausea and vomiting.   Neurological:  Negative for dizziness.     Current Outpatient Medications   Medication Sig Dispense Refill    Meloxicam 15 MG Oral Tab Take 1 tablet (15 mg total) by mouth daily. 90 tablet 0    gabapentin 300 MG Oral Cap One capsule at bedtime 90 capsule 1    Olopatadine HCl 0.2 % Ophthalmic Solution Apply 1 Application to eye 2 (two) times daily as needed. 2.5 mL 1    montelukast 10 MG Oral Tab Take 1 tablet (10 mg total) by mouth nightly. 90 tablet 3    alfuzosin ER 10 MG Oral Tablet 24 Hr Take 1 tablet (10 mg total) by mouth daily. 90 tablet 5    traMADol 50 MG Oral Tab Please take 1 tablet every 6 hours as needed for pain. Max of 8 tablets per day. Collaborating - Dr. Jennifer Sandoval. 20 tablet 1     ondansetron (ZOFRAN) 4 mg tablet Take 1 tablet by mouth every 8 hours as needed for nausea. 8 tablet 0    fluticasone-umeclidin-vilant (TRELEGY ELLIPTA) 200-62.5-25 MCG/ACT Inhalation Aerosol Powder, Breath Activated Inhale 1 puff into the lungs daily. 60 each 5    budesonide 0.5 MG/2ML Inhalation Suspension Take 2 mL (0.5 mg total) by nebulization daily. 120 mL 5    atorvastatin 10 MG Oral Tab Take 1 tablet (10 mg total) by mouth nightly. 90 tablet 1    albuterol 108 (90 Base) MCG/ACT Inhalation Aero Soln Inhale 1 puff into the lungs every 6 (six) hours as needed. 1 each 1    gabapentin 100 MG Oral Cap Take 3 capsules (300 mg total) by mouth nightly. 100mg at bedtime x 1 week then increase to 200mg at bedtime x 1 week then 300mg at bedtime 90 capsule 2    FLOVENT  MCG/ACT Inhalation Aerosol Inhale 2 puffs into the lungs 2 (two) times daily. 1 each 3    Respiratory Therapy Supplies (NEBULIZER) Does not apply Device Please use every 6 hour as needed for Shortness of breath, wheezing 1 Device 0     Allergies:Allergies[1]    Objective:   Physical Exam  Constitutional:       General: He is not in acute distress.  Neurological:      Mental Status: He is alert.         Assessment & Plan:   1. Foot pain, right   -Likely musculoskeletal in nature, possibly inflammatory  - Will refer to podiatry for further evaluation management  - May take Tylenol as needed for pain   2. Ridged nails   -Unclear as to etiology  - He does not have what seems to be respiratory or cardiac symptoms  - I will refer to podiatry for further evaluation management     This note was prepared using Dragon [employer] voice recognition dictation software. As a result errors may occur. When identified these errors have been corrected. While every attempt is made to correct errors during dictation discrepancies may still exist          No orders of the defined types were placed in this encounter.      Meds This Visit:  Requested Prescriptions      No  prescriptions requested or ordered in this encounter       Imaging & Referrals:  [facility] - INTERNAL  PODIATRY - INTERNAL         [1] No Known Allergies

## 2024-11-25 ENCOUNTER — TELEMEDICINE (OUTPATIENT)
Dept: FAMILY MEDICINE CLINIC | Facility: CLINIC | Age: 59
End: 2024-11-25
Payer: COMMERCIAL

## 2024-11-25 DIAGNOSIS — J01.91 ACUTE RECURRENT SINUSITIS, UNSPECIFIED LOCATION: Primary | ICD-10-CM

## 2024-11-25 RX ORDER — METHYLPREDNISOLONE 4 MG/1
TABLET ORAL
Qty: 1 EACH | Refills: 0 | Status: SHIPPED | OUTPATIENT
Start: 2024-11-25

## 2024-11-25 NOTE — PROGRESS NOTES
Subjective:   Patient ID: Devin Urrutia is a 59 year old male.    HPI  Mr. Urrutia is a pleasant 59-year-old gentleman with history of asthma, hypothyroidism, hyperlipidemia, arthritis, recurrent sinusitis, allergic rhinitis presenting for video visit today for congestion which involves his sinuses, chest and throat.  This has been occurring since yesterday.  He has been having sore throat and fatigue and feels sleepy.  He did have body aches and fever yesterday but seem to have improved.  No cough no shortness of breath. I had reviewed past medical and family histories together with allergy and medication lists documented.    History/Other:   Review of Systems   Gastrointestinal:  Negative for abdominal pain, constipation, diarrhea, nausea and vomiting.     Current Outpatient Medications   Medication Sig Dispense Refill    amoxicillin clavulanate 875-125 MG Oral Tab Take 1 tablet by mouth 2 (two) times daily for 10 days. 20 tablet 0    methylPREDNISolone (MEDROL) 4 MG Oral Tablet Therapy Pack As directed. 1 each 0    Meloxicam 15 MG Oral Tab Take 1 tablet (15 mg total) by mouth daily. 90 tablet 0    gabapentin 300 MG Oral Cap One capsule at bedtime 90 capsule 1    Olopatadine HCl 0.2 % Ophthalmic Solution Apply 1 Application to eye 2 (two) times daily as needed. 2.5 mL 1    montelukast 10 MG Oral Tab Take 1 tablet (10 mg total) by mouth nightly. 90 tablet 3    alfuzosin ER 10 MG Oral Tablet 24 Hr Take 1 tablet (10 mg total) by mouth daily. 90 tablet 5    traMADol 50 MG Oral Tab Please take 1 tablet every 6 hours as needed for pain. Max of 8 tablets per day. Collaborating - Dr. Jennifer Sandoval. 20 tablet 1    ondansetron (ZOFRAN) 4 mg tablet Take 1 tablet by mouth every 8 hours as needed for nausea. 8 tablet 0    fluticasone-umeclidin-vilant (TRELEGY ELLIPTA) 200-62.5-25 MCG/ACT Inhalation Aerosol Powder, Breath Activated Inhale 1 puff into the lungs daily. 60 each 5    budesonide 0.5 MG/2ML Inhalation Suspension Take 2 mL  (0.5 mg total) by nebulization daily. 120 mL 5    atorvastatin 10 MG Oral Tab Take 1 tablet (10 mg total) by mouth nightly. 90 tablet 1    albuterol 108 (90 Base) MCG/ACT Inhalation Aero Soln Inhale 1 puff into the lungs every 6 (six) hours as needed. 1 each 1    gabapentin 100 MG Oral Cap Take 3 capsules (300 mg total) by mouth nightly. 100mg at bedtime x 1 week then increase to 200mg at bedtime x 1 week then 300mg at bedtime 90 capsule 2    FLOVENT  MCG/ACT Inhalation Aerosol Inhale 2 puffs into the lungs 2 (two) times daily. 1 each 3    Respiratory Therapy Supplies (NEBULIZER) Does not apply Device Please use every 6 hour as needed for Shortness of breath, wheezing 1 Device 0     Allergies:Allergies[1]    Objective:   Physical Exam  Constitutional:       General: He is not in acute distress.  Neurological:      Mental Status: He is alert.         Assessment & Plan:   1. Acute recurrent sinusitis, unspecified location    -Keep hydrated with fluid and electrolytes  - May take Tylenol as needed for fever or pain  - We will going to treat with Augmentin and Medrol Dosepak  - Check for COVID-19  - Go to the emergency room with respiratory distress or severe dehydration  - Call or come in sooner if symptoms worsen or persist      This note was prepared using Dragon Medical voice recognition dictation software. As a result errors may occur. When identified these errors have been corrected. While every attempt is made to correct errors during dictation discrepancies may still exist          No orders of the defined types were placed in this encounter.      Meds This Visit:  Requested Prescriptions     Signed Prescriptions Disp Refills    amoxicillin clavulanate 875-125 MG Oral Tab 20 tablet 0     Sig: Take 1 tablet by mouth 2 (two) times daily for 10 days.    methylPREDNISolone (MEDROL) 4 MG Oral Tablet Therapy Pack 1 each 0     Sig: As directed.       Imaging & Referrals:  None         [1] No Known Allergies

## 2024-11-26 ENCOUNTER — VIRTUAL PHONE E/M (OUTPATIENT)
Dept: RHEUMATOLOGY | Facility: CLINIC | Age: 59
End: 2024-11-26
Payer: COMMERCIAL

## 2024-11-26 ENCOUNTER — TELEPHONE (OUTPATIENT)
Dept: ORTHOPEDICS CLINIC | Facility: CLINIC | Age: 59
End: 2024-11-26

## 2024-11-26 DIAGNOSIS — M79.18 MYOFASCIAL PAIN DYSFUNCTION SYNDROME: Primary | ICD-10-CM

## 2024-11-26 DIAGNOSIS — M79.671 RIGHT FOOT PAIN: Primary | ICD-10-CM

## 2024-11-26 DIAGNOSIS — M62.830 LUMBAR PARASPINAL MUSCLE SPASM: ICD-10-CM

## 2024-11-26 DIAGNOSIS — M15.0 PRIMARY OSTEOARTHRITIS INVOLVING MULTIPLE JOINTS: ICD-10-CM

## 2024-11-26 PROCEDURE — 99442 PHONE E/M BY PHYS 11-20 MIN: CPT | Performed by: INTERNAL MEDICINE

## 2024-11-26 NOTE — PROGRESS NOTES
Virtual Telephone Check-In    Devin Urrutia verbally consents to a Virtual/Telephone Check-In visit on 11/26/24.  Patient has been referred to the [facility] website at [email or web address] to review the yearly Consent to Treat document.    Patient understands and accepts financial responsibility for any deductible, co-insurance and/or co-pays associated with this service.    Duration of the service: 20 minutes      Summary of topics discussed:       Diagnoses and all orders for this visit:    Myofascial pain dysfunction syndrome    Lumbar paraspinal muscle spasm    Primary osteoarthritis involving multiple joints      Patient has concerns of generalized pain we have discussed myofascial pain syndrome continue to follow-up with pain management and making sure he gets the cervical neck MRI.  Patient states pain has improved he is on gabapentin 300 mg at bedtime with meloxicam.  Reminded patient to get NSAID labs to monitor for drug toxicity orders were placed October 2024    Patient was offered increasing gabapentin to 600 mg at bedtime over the next 3 to 4 weeks with 100 mg tablets with patient declined    States no swelling of his joints.  He has no concerns otherwise states he is stable and he will follow-up with pain management and PCP as discussed    Appears patient had recent sinus infection on antibiotics and Medrol Dosepak      Morena Olguin MD

## 2024-11-26 NOTE — TELEPHONE ENCOUNTER
XR ordered per ortho protocol. XR scheduled and patient was notified via [facility] to let them know that they should arrive 15-20 minutes early, in order for them to complete imaging.

## 2024-11-26 NOTE — TELEPHONE ENCOUNTER
Patient scheduled on [facility] with Dr Choi for right foot pain. Please advise if imaging is needed  Future Appointments   Date Time Provider Department Center   11/26/2024 12:40 PM Morena Olguin MD [id number] EMG [address]   12/10/2024  5:45 PM PF MRI RM1 (1.5T) PF MRI [place]   12/17/2024 10:00 AM Sheng Jones MD EMGOTONAPER [id number]   12/18/2024  2:00 PM Alba Choi, DPM EMG ORTHO 75 EMG [place]   3/25/2025 11:00 AM Lexus Vieyra MD EEMG [id number] EMG [place]   9/2/2025 10:45 AM Julio Birmingham MD [id number] EC Nap 4

## 2024-11-27 RX ORDER — ATORVASTATIN CALCIUM 10 MG/1
10 TABLET, FILM COATED ORAL NIGHTLY
Qty: 90 TABLET | Refills: 1 | Status: SHIPPED | OUTPATIENT
Start: 2024-11-27

## 2024-11-27 NOTE — TELEPHONE ENCOUNTER
Fax received from [facility]  patient requesting new RX Atrovastatin 10 MG TABLET. 90 DAY QUANTITY.       [facility]/pharmacy #[id number] - [place], [place] - [address] AT [address], [phone], [phone] [address]  [facility] [address]  Phone: [phone] Fax: [phone]

## 2024-12-05 ENCOUNTER — TELEPHONE (OUTPATIENT)
Dept: ORTHOPEDICS CLINIC | Facility: CLINIC | Age: 59
End: 2024-12-05

## 2024-12-05 DIAGNOSIS — M25.511 RIGHT SHOULDER PAIN, UNSPECIFIED CHRONICITY: Primary | ICD-10-CM

## 2024-12-05 NOTE — TELEPHONE ENCOUNTER
Patient self scheduled for right shoulder. Please advise for xrays   Future Appointments   Date Time Provider Department Center   12/10/2024  5:45 PM PF MRI RM1 (1.5T) PF MRI [place]   12/17/2024 10:00 AM Sheng Jones MD EMGOTONAPER [id number]   12/18/2024 11:20 AM Jennifer Sandoval MD EMG ORTHO Wo [id number]   12/18/2024  1:45 PM NAP XR RM1 NAP XRAY EDW [place]   12/18/2024  2:00 PM Alba Choi, DPHUONG EMG ORTHO 75 EMG [facility]   3/25/2025 11:00 AM Lexus Vieyra MD EEMG [id number] EMG [place]   9/2/2025 10:45 AM Julio Birmingham MD [id number] EC Nap 4

## 2024-12-05 NOTE — TELEPHONE ENCOUNTER
Patient called and notified of coming early for imaging.  He states that is fine and he will come in early.  He also asked if his insurance will cover the x rays.  He was told he can call billing to further discuss the cost in detail.  He did not want the phone number for billing at this time.     Future Appointments   Date Time Provider Department Center   12/10/2024  5:45 PM PF MRI RM1 (1.5T) PF MRI [place]   12/17/2024 10:00 AM Sheng Jones MD EMGOTONAPER [id number]   12/18/2024 11:00 AM WDR XR RM2 WDR XRAY EDW [place]   12/18/2024 11:20 AM Jennifer Sandoval MD EMG ORTHO Wo [id number]   12/18/2024  1:45 PM NAP XR RM1 NAP XRAY EDW [place]   12/18/2024  2:00 PM Alba Choi, DPHUONG EMG ORTHO 75 EMG [facility]   3/25/2025 11:00 AM Lexus Vieyra MD EEMG [id number] EMG [place]   9/2/2025 10:45 AM Julio Birmingham MD [id number] EC [address] 4

## 2024-12-10 ENCOUNTER — HOSPITAL ENCOUNTER (OUTPATIENT)
Dept: MRI IMAGING | Age: 59
Discharge: HOME | End: 2024-12-10
Attending: INTERNAL MEDICINE
Payer: COMMERCIAL

## 2024-12-10 DIAGNOSIS — M15.0 PRIMARY OSTEOARTHRITIS INVOLVING MULTIPLE JOINTS: ICD-10-CM

## 2024-12-10 DIAGNOSIS — M79.18 MYOFASCIAL PAIN DYSFUNCTION SYNDROME: ICD-10-CM

## 2024-12-10 DIAGNOSIS — M54.12 CERVICAL RADICULOPATHY: ICD-10-CM

## 2024-12-10 PROCEDURE — 72141 MRI NECK SPINE W/O DYE: CPT | Performed by: INTERNAL MEDICINE

## 2024-12-11 ENCOUNTER — TELEPHONE (OUTPATIENT)
Dept: RHEUMATOLOGY | Facility: CLINIC | Age: 59
End: 2024-12-11

## 2024-12-11 NOTE — TELEPHONE ENCOUNTER
FINDINGS:    CERVICAL DISC LEVELS:  C2-C3:  No significant disc/facet abnormality, spinal stenosis, or foraminal narrowing.  C3-C4:  Minimal posterior disc bulge.  No significant central canal stenosis.  Facet/uncovertebral hypertrophy contribute to mild/moderate bilateral foraminal stenosis.  C4-C5:  No significant disc/facet abnormality, spinal stenosis, or foraminal narrowing.  C5-C6:  Mild diffuse disc bulge abuts the ventral thecal sac without significant central canal stenosis.  Facet and uncovertebral hypertrophy contribute to mild/moderate bilateral foraminal stenosis.  C6-C7:  Mild diffuse disc bulge abuts the ventral thecal sac without significant central canal stenosis.  Facet and uncovertebral hypertrophy contribute to moderate bilateral foraminal stenosis.  C7-T1:  No significant disc/facet abnormality, spinal stenosis, or foraminal narrowing.     CRANIOCERVICAL AREA:  Normal foramen magnum with no Chiari malformation.    PARASPINAL AREA:  Normal with no visible mass.    BONY STRUCTURES:  No fracture, pars defect, or osseous lesion.    CORD:  Normal caliber, contour, and signal intensity.                    Impression  CONCLUSION:       1. Multilevel degenerative changes of the spine resulting in moderate bilateral foraminal stenosis at C6-7 and mild/moderate foraminal stenosis at the C3-4 and C5-6 levels.  Correlate with radicular symptoms.  No significant central canal stenosis  throughout the cervical spine.     2. Normal caliber, contour, and signal intensity of the cervical spinal cord.            MRI of the cervical spine shows moderate stenosis and few disc bulges at multiple levels would suggest following up with pain management for consideration of further interventions and injections and physical therapy

## 2024-12-12 ENCOUNTER — TELEPHONE (OUTPATIENT)
Dept: PAIN CLINIC | Facility: CLINIC | Age: 59
End: 2024-12-12

## 2024-12-12 DIAGNOSIS — M54.12 CERVICAL RADICULITIS: ICD-10-CM

## 2024-12-12 DIAGNOSIS — M48.02 FORAMINAL STENOSIS OF CERVICAL REGION: Primary | ICD-10-CM

## 2024-12-12 NOTE — TELEPHONE ENCOUNTER
Quintin Dow PA P [facility] Pain Nurse  There is mild to moderate foraminal stenosis noted on MRI C spine.  Could consider PARMJIT for diagnostic and potential therapeutic purposes should he so desire.  Please let him know, and if he wishes to proceed with PARMJIT we will be happy to place order.    Contacted pt at this time and relayed the MRI results and recommendation. Pt is due and would like to proceed with the PARMJIT. Advised pt that RN will let Quintin know to place the order and will give him a callback as soon as we hear from his insurance. Pt vu and no further questions at this time.

## 2024-12-12 NOTE — TELEPHONE ENCOUNTER
Called pt, notified of test results per Dr. Olguin. \"MRI of the cervical spine shows moderate stenosis and few disc bulges at multiple levels would suggest following up with pain management for consideration of further interventions and injections and physical therapy \"  pt denies questions or concerns.

## 2024-12-13 ENCOUNTER — TELEPHONE (OUTPATIENT)
Dept: PAIN CLINIC | Facility: CLINIC | Age: 59
End: 2024-12-13

## 2024-12-13 DIAGNOSIS — M54.12 CERVICAL RADICULITIS: Primary | ICD-10-CM

## 2024-12-13 NOTE — TELEPHONE ENCOUNTER
Patient called stating that he was returning nurse call to scheduled procedure. Patient stated that he would like to schedule this ASAP. Please call patient back.

## 2024-12-13 NOTE — TELEPHONE ENCOUNTER
Patient advised of insurance approval to proceed with injections and is agreeable to scheduling. Patient scheduled for procedure, pre-procedure instructions reviewed. Patient prefers Local sedation. Reviewed sedation instructions including No Fasting & No  Required. Patient encouraged but not required to hold meloxicam for 24 hours prior to procedure. Patient verbalized understanding of instructions, no further needs at this time.      [facility]  PRE-PROCEDURE INSTRUCTIONS WITHOUT SEDATION    Procedure: PARMJIT       Appointment Date: 12/30/2024  Check-In Time: 08:45 AM    Follow-Up Date/Time: 01/14/2025 @ 11:30 AM    Prior to the procedure:  Please update us prior to the procedure if you are experiencing any symptoms of infection such as cough, fever, chills, urinary symptoms, or have recently been prescribed antibiotics, have open wounds, have recently had surgery or dental procedures.    Day of Procedure:  **Drivers will be required for patients who receive prescriptions for Valium.    NO FASTING REQUIRED  Please bring your Insurance Card, Photo ID, List of Current Medications and Referral (if applicable) to your appointment.  Please park in the [facility] [employer] [facility] and follow the signs to the [facility].  Check in at [facility] ([address] [place] [address]) outpatient registration in the [facility].  Please note-No prescriptions will be written by Pain Clinic in [place] on the day of procedure. If you require a refill of medications, please contact the office 48 hours prior to your procedure.  If you have an implanted Spinal Cord or Peripheral Nerve Stimulator: Please remember to turn device off for procedure.        Medication Hold:    Number of days you need to be off for the following medications:    Aggrenox 10 days   Agrylin (Anagrelide) 10 days  Brilinta (Ticagrelor) 7 days  Imbruvica (Ibrutinib) 3 days   Enbrel (Etanercept) 24 hours   Fragmin (Dalteparin) 24 hours   Pletal  (Cilostazol) 7 days  Effient (Prasugrel) 7 days  Pradaxa 10 days  Trental 7 days  Eliquis (Apixaban) 3 days  Xarelto (Rivaroxaban) 3 days  Lovenox (Enoxaparin) 24 hours  Aspirin  Greater than 81mg but less than 325mg   5 days  325mg and greater                  7 days  Coumadin       5 days  Procedure may be cancelled if INR is elevated.   Excedrin (with aspirin) 7 days  Plavix (Clopidogrel)                            7 days    NSAIDs: 24 hours preferred      Ibuprofen (Motrin, Advil, Vicoprofen), Naproxen (Naprosyn, Aleve), Piroxcam (Feldene), Meloxicam (Mobic), Oxaprozin (Daypro), Diclofenac (Voltaren), Indomethacin (Indocin), Etodolac (Lodine), Nabumetone (Relafen), Celebrex (Celecoxib)           HERBAL SUPPLEMENTS  5 days preferred  Fish oil, krill oil, Omega-3, Vascepa, Vitamin E, Turmeric, Garlic                       Insurance Authorization:   Most insurances are now requiring a preauthorization for all procedures.  In the event that your insurance does not authorize your procedure within 48 hours of the scheduled date, your procedure will be cancelled and rescheduled to a later date.  Please contact your insurance carrier to determine what your financial responsibility will be for the procedure(s).      Cancellation/Rescheduling Appointment:   In the event you need to cancel or reschedule your appointment, you must notify the office 24 hours prior.    Post-procedure instructions:        Please schedule a follow up visit within 2 to 4 weeks after your last procedure date   Please call our office with any questions or concerns before or after your procedure at  [phone].  If you are a diabetic, please increase the frequency of your glucose monitoring after the procedure as this may cause a temporary increase in your blood sugar.  Contact your primary care physician if your blood sugar rises as you may require some medication adjustment.  It is normal to have increased pain at injection site for up to 3-5  days after procedure, you can use heat or ice (20 minutes on 20 minutes off) for comfort.    **To hear a recorded version of these instructions, please call [phone] and follow the prompts.  **Para escuchar las instrucciones en Español, por favor de llamar el michelle [phone] opción 4.

## 2024-12-13 NOTE — TELEPHONE ENCOUNTER
Prior authorization request completed for: PARMJIT  Authorization # [id number]  Authorization dates: 12/13/24-2/11/25  CPT codes approved: 25626  Number of visits/dates of service approved: 1  Physician: aziza  Location: [facility]     Patient can be scheduled. Routed to Navigator.

## 2024-12-18 ENCOUNTER — APPOINTMENT (OUTPATIENT)
Dept: GENERAL RADIOLOGY | Age: 59
End: 2024-12-18
Attending: PODIATRIST
Payer: COMMERCIAL

## 2024-12-18 ENCOUNTER — HOSPITAL ENCOUNTER (OUTPATIENT)
Dept: GENERAL RADIOLOGY | Age: 59
Discharge: HOME | End: 2024-12-18
Attending: ORTHOPAEDIC SURGERY
Payer: COMMERCIAL

## 2024-12-18 ENCOUNTER — OFFICE VISIT (OUTPATIENT)
Dept: ORTHOPEDICS CLINIC | Facility: CLINIC | Age: 59
End: 2024-12-18
Payer: COMMERCIAL

## 2024-12-18 VITALS — WEIGHT: 186 LBS | HEIGHT: 71 IN | BODY MASS INDEX: 26.04 KG/M2

## 2024-12-18 DIAGNOSIS — M17.0 PRIMARY OSTEOARTHRITIS OF BOTH KNEES: ICD-10-CM

## 2024-12-18 DIAGNOSIS — S46.001A INJURY OF RIGHT ROTATOR CUFF, INITIAL ENCOUNTER: Primary | ICD-10-CM

## 2024-12-18 DIAGNOSIS — M25.511 RIGHT SHOULDER PAIN, UNSPECIFIED CHRONICITY: ICD-10-CM

## 2024-12-18 PROCEDURE — 99214 OFFICE O/P EST MOD 30 MIN: CPT | Performed by: ORTHOPAEDIC SURGERY

## 2024-12-18 PROCEDURE — 73030 X-RAY EXAM OF SHOULDER: CPT | Performed by: ORTHOPAEDIC SURGERY

## 2024-12-18 NOTE — H&P
Orthopaedic Surgery  [address] [place] [id number]  [phone]     HISTORY AND PHYSICAL EXAMINATION     Name: Devin Urrutia   MRN: [id number]  Date: 12/18/2024     CC: Right shoulder pain    REFERRED BY: Braxton Rivas MD    HPI:   Devin Urrutia is a very pleasant 59 year old right-hand dominant male who presents today for evaluation of right shoulder pain ongoing for 12 weeks. Denies any known injuries. Pain is rated up to 3/10. He has not tried any conservative treatment measures.    Patient is also well known to us for Right knee scope, abrasionaplasty extensive debridement on 08/13/2024. Overall, he seems to have continued pain despite undergoing all conservative treatment measures.    PMH:   Past Medical History:    Allergic rhinitis    Asthma (HCC)    High cholesterol    Hyperlipidemia    Visual impairment       PAST SURGICAL HX:  Past Surgical History:   Procedure Laterality Date    Arthroscopy of joint unlisted      Colonoscopy         FAMILY HX:  Family History   Problem Relation Age of Onset    Heart Disorder Father     Hypertension Mother     Heart Disorder Mother     Asthma Mother        ALLERGIES:  Patient has no known allergies.    MEDICATIONS:   Current Outpatient Medications   Medication Sig Dispense Refill    atorvastatin 10 MG Oral Tab Take 1 tablet (10 mg total) by mouth nightly. 90 tablet 1    gabapentin 300 MG Oral Cap One capsule at bedtime 90 capsule 1    Olopatadine HCl 0.2 % Ophthalmic Solution Apply 1 Application to eye 2 (two) times daily as needed. 2.5 mL 1    montelukast 10 MG Oral Tab Take 1 tablet (10 mg total) by mouth nightly. 90 tablet 3    alfuzosin ER 10 MG Oral Tablet 24 Hr Take 1 tablet (10 mg total) by mouth daily. 90 tablet 5    fluticasone-umeclidin-vilant (TRELEGY ELLIPTA) 200-62.5-25 MCG/ACT Inhalation Aerosol Powder, Breath Activated Inhale 1 puff into the lungs daily. 60 each 5    budesonide 0.5 MG/2ML Inhalation Suspension Take 2 mL (0.5 mg total) by  nebulization daily. 120 mL 5    albuterol 108 (90 Base) MCG/ACT Inhalation Aero Soln Inhale 1 puff into the lungs every 6 (six) hours as needed. 1 each 1    gabapentin 100 MG Oral Cap Take 3 capsules (300 mg total) by mouth nightly. 100mg at bedtime x 1 week then increase to 200mg at bedtime x 1 week then 300mg at bedtime 90 capsule 2    FLOVENT  MCG/ACT Inhalation Aerosol Inhale 2 puffs into the lungs 2 (two) times daily. 1 each 3    Respiratory Therapy Supplies (NEBULIZER) Does not apply Device Please use every 6 hour as needed for Shortness of breath, wheezing 1 Device 0    methylPREDNISolone (MEDROL) 4 MG Oral Tablet Therapy Pack As directed. (Patient not taking: Reported on 2024) 1 each 0    Meloxicam 15 MG Oral Tab Take 1 tablet (15 mg total) by mouth daily. (Patient not taking: Reported on 2024) 90 tablet 0    traMADol 50 MG Oral Tab Please take 1 tablet every 6 hours as needed for pain. Max of 8 tablets per day. Collaborating - Dr. Jennifer Sandoval. (Patient not taking: Reported on 2024) 20 tablet 1    ondansetron (ZOFRAN) 4 mg tablet Take 1 tablet by mouth every 8 hours as needed for nausea. (Patient not taking: Reported on 2024) 8 tablet 0       ROS: A comprehensive 14 point review of systems was performed and was negative aside from the aforementioned per history of present illness.    SOCIAL HX:  Social History     Tobacco Use    Smoking status: Former     Current packs/day: 0.00     Average packs/day: 0.5 packs/day for 25.0 years (12.5 ttl pk-yrs)     Types: Cigarettes     Start date:      Quit date:      Years since quittin.9     Passive exposure: Past    Smokeless tobacco: Former    Tobacco comments:     N   Substance Use Topics    Alcohol use: Never       PE:   Vitals:    24 1121   Weight: 186 lb   Height: 5' 11\" (1.803 m)     Estimated body mass index is 25.94 kg/m² as calculated from the following:    Height as of this encounter: 5' 11\" (1.803 m).     Weight as of this encounter: 186 lb.    Physical Exam  Constitutional:       Appearance: Normal appearance.   HENT:      Head: Normocephalic and atraumatic.   Eyes:      Extraocular Movements: Extraocular movements intact.   Neck:      Musculoskeletal: Normal range of motion and neck supple.   Cardiovascular:      Pulses: Normal pulses.   Pulmonary:      Effort: Pulmonary effort is normal. No respiratory distress.   Abdominal:      General: There is no distension.   Skin:     General: Skin is warm.      Capillary Refill: Capillary refill takes less than 2 seconds.      Findings: No bruising.   Neurological:      General: No focal deficit present.      Mental Status: Alert.   Psychiatric:         Mood and Affect: Mood normal.     Examination of the right shoulder demonstrates:   Skin is intact, warm and dry.   Cervical:  Full ROM  Spurling's  Negative    Deformity:   none  Atrophy:   none    Scapular winging: Negative    Palpation:     AC Joint   Negative  Biceps Tendon  Negative  Greater Tuberosity Negative    ROM:   Forward Flexion:  full and symmetric  Abduction:   full and symmetric  External Rotation:  full and symmetric  Internal Rotation:  full and symmetric    Rotator Cuff Strength:   Supraspinatus:   4/5  Subscapularis:   5/5  Infraspinatus/Teres: 5/5    Provocative Tests:   Miller:   Positive  Speed's:   Negative  Elliott's:   Positive  Lift-off:    Negative  Apprehension:  Negative  Sulcus Sign:   Negative    Neurovascular Upper Extremity (Bilateral)  Motor:    5/5 EPL, Finger Abduction, , Pinch, Deltoid  Sensation:   intact to light touch median, ulnar, radial and axillary nerve  Circulation:   Normal, 2+ radial pulse    The contralateral upper extremity is without limitation in range of motion or strength, no positive provocative maneuvers.       Radiographic Examination/Diagnostics:    Shoulder XR personally viewed, independently interpreted and radiology report was reviewed.    Radiographs  demonstrate no evidence of osteoarthritis with well-maintained joint space.  No fractures or dislocations.      XR SHOULDER, COMPLETE (MIN 2 VIEWS), RIGHT (CPT=73030)    Result Date: 12/18/2024  CONCLUSION:  No acute fractures.  Osteoarthritic changes.   LOCATION:  [facility]   Dictated by (CST): Alphonse Ye MD on 12/18/2024 at 11:34 AM     Finalized by (CST): Alphonse Ye MD on 12/18/2024 at 11:34 AM       MRI SPINE CERVICAL (CPT=72141)    Result Date: 12/10/2024  CONCLUSION:   1. Multilevel degenerative changes of the spine resulting in moderate bilateral foraminal stenosis at C6-7 and mild/moderate foraminal stenosis at the C3-4 and C5-6 levels.  Correlate with radicular symptoms.  No significant central canal stenosis throughout the cervical spine.  2. Normal caliber, contour, and signal intensity of the cervical spinal cord.    LOCATION:  [id number]   Dictated by (CST): Emani Byers MD on 12/10/2024 at 11:02 PM     Finalized by (CST): Emani Byers MD on 12/10/2024 at 11:08 PM          IMPRESSION: Devin Urrutia is a 59 year old Right hand dominant male with suspected right rotator cuff injury sustained 12 weeks ago.    In light of physical findings, we elected to order an MRI to further characterize internal derangement.     Patient also presents with bilateral knee ostearthritis s/p knee arthroscopy. He will be referred to a joint replacement surgeon for further surgical discussion.    PLAN:   We had a detailed discussion outlining the etiology, anatomy, pathophysiology, and natural history of rotator cuff. Imaging was reviewed in detail and correlated to a 3-dimensional model of the shoulder.     In light of the chronicity of symptoms, loss of normal function, and  failure to progress conservatively we recommend an MRI to evaluate the integrity of the patient's rotator cuff. The patient will follow up after imaging.   Differential diagnosis includes but not limited to: rotator cuff/labral pathology,  impingement, tendinopathy, cartilage injury/loose body, bone marrow edema, and osteoarthritis.     External records were also reviewed for pertinent historical findings contributing to the patients undiagnosed new problem with uncertain prognosis.     The patient had the opportunity to ask questions, and all questions were answered appropriately.      FOLLOW-UP:   Return to clinic after MRI completion.       Jennifer Sandoval MD  Knee, Shoulder, & Elbow Surgery / Sports Medicine Specialist  Orthopaedic Surgery  [address] [place] [address] [id number] [place] [address].org  [email or web address]  t: [phone]  o: [phone]  f: [phone]    This note was dictated using Dragon software.  While it was briefly proofread prior to completion, some grammatical, spelling, and word choice errors due to dictation may still occur.

## 2024-12-20 ENCOUNTER — TELEPHONE (OUTPATIENT)
Dept: ORTHOPEDICS CLINIC | Facility: CLINIC | Age: 59
End: 2024-12-20

## 2024-12-20 NOTE — TELEPHONE ENCOUNTER
Per Dr Patel, call patient to offer appointment for Bilateral knee pain. Called patient and scheduled appointment on 12/24 at 10am at [facility].

## 2024-12-24 ENCOUNTER — OFFICE VISIT (OUTPATIENT)
Age: 59
End: 2024-12-24
Payer: COMMERCIAL

## 2024-12-24 ENCOUNTER — HOSPITAL ENCOUNTER (OUTPATIENT)
Dept: GENERAL RADIOLOGY | Facility: HOSPITAL | Age: 59
Discharge: HOME | End: 2024-12-24
Attending: ORTHOPAEDIC SURGERY
Payer: COMMERCIAL

## 2024-12-24 VITALS — HEIGHT: 71 IN | WEIGHT: 192.81 LBS | BODY MASS INDEX: 26.99 KG/M2

## 2024-12-24 DIAGNOSIS — R10.2 PELVIC PAIN: ICD-10-CM

## 2024-12-24 DIAGNOSIS — M25.569 KNEE PAIN, UNSPECIFIED CHRONICITY, UNSPECIFIED LATERALITY: ICD-10-CM

## 2024-12-24 DIAGNOSIS — M25.569 KNEE PAIN, UNSPECIFIED CHRONICITY, UNSPECIFIED LATERALITY: Primary | ICD-10-CM

## 2024-12-24 PROCEDURE — 72170 X-RAY EXAM OF PELVIS: CPT | Performed by: ORTHOPAEDIC SURGERY

## 2024-12-24 PROCEDURE — 99204 OFFICE O/P NEW MOD 45 MIN: CPT | Performed by: ORTHOPAEDIC SURGERY

## 2024-12-24 PROCEDURE — 73564 X-RAY EXAM KNEE 4 OR MORE: CPT | Performed by: ORTHOPAEDIC SURGERY

## 2024-12-24 NOTE — PROGRESS NOTES
Chief Complaint: Knee Pain (Consult- bilateral knees- onset- 3 yrs ago pt stated had jono knees sx this year w/ Dr. Sandoval- rates pain 3/10 most of the time)      HPI  Mr. Urrutia is referred by No ref. provider found. Devin Urrutia is a 59 year old male being seen in the office today for Knee Pain (Consult- bilateral knees- onset- 3 yrs ago pt stated had jono knees sx this year w/ Dr. Sandoval- rates pain 3/10 most of the time)    The patient is a 59-year-old male presents today with complaints of bilateral knee pain.  He is referral from Dr. Sandoval.  He states that this been going on for about 3 years.  He endorses a fall at that time.  He localizes the pain to the anterior aspect of the knee and medially.  He notes with weightbearing activities but does have rest pain.  He also has pain when he first gets up from seated position.  Denies any mechanical symptoms.  Denies any instability symptoms.  He has trouble navigating stairs.  He has no trouble walking even 1 block with pain in the knee.  He also has trouble kneeling for prayer.  He does not endorse any night symptoms.  Denies any fevers chills or joint pain elsewhere.  Denies any numbness or tingling.  Denies any hip or back pain today.  He does have a history of bilateral knee arthroscopies.  Right was performed in March, left was performed in August.  He states that these did not give him substantial relief.  The patient does feel this is affecting his quality of life.  He did recently see rheumatology as he does have some joint pain elsewhere.  He was recommended evaluation by pain management.  He is seeing pain management next month.  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      Treatment (s):  Medications?  OTC analgesics, meloxicam  Injections?  Cortisone injections and HA injections in the past  Physical therapy or home exercise program?  After surgery  Assistive device?  None  Weight loss ( if applicable)?  No  Surgery at affected  knee?  As above    Patient reports no chronic narcotic use.      History:  Past Medical History:    Allergic rhinitis    Asthma (HCC)    High cholesterol    Hyperlipidemia    Visual impairment     Past Surgical History:   Procedure Laterality Date    Arthroscopy of joint unlisted      Colonoscopy       Family History   Problem Relation Age of Onset    Heart Disorder Father     Hypertension Mother     Heart Disorder Mother     Asthma Mother      Family Status   Relation Status    Fa     Mo Alive     Social History     Occupational History    Not on file   Tobacco Use    Smoking status: Former     Current packs/day: 0.00     Average packs/day: 0.5 packs/day for 25.0 years (12.5 ttl pk-yrs)     Types: Cigarettes     Start date:      Quit date:      Years since quittin.9     Passive exposure: Past    Smokeless tobacco: Former    Tobacco comments:     N   Vaping Use    Vaping status: Never Used   Substance and Sexual Activity    Alcohol use: Never    Drug use: Never    Sexual activity: Not on file       Medications:  Current Outpatient Medications   Medication Sig Dispense Refill    atorvastatin 10 MG Oral Tab Take 1 tablet (10 mg total) by mouth nightly. 90 tablet 1    methylPREDNISolone (MEDROL) 4 MG Oral Tablet Therapy Pack As directed. (Patient not taking: Reported on 2024) 1 each 0    Meloxicam 15 MG Oral Tab Take 1 tablet (15 mg total) by mouth daily. (Patient not taking: Reported on 2024) 90 tablet 0    gabapentin 300 MG Oral Cap One capsule at bedtime 90 capsule 1    Olopatadine HCl 0.2 % Ophthalmic Solution Apply 1 Application to eye 2 (two) times daily as needed. 2.5 mL 1    montelukast 10 MG Oral Tab Take 1 tablet (10 mg total) by mouth nightly. 90 tablet 3    alfuzosin ER 10 MG Oral Tablet 24 Hr Take 1 tablet (10 mg total) by mouth daily. 90 tablet 5    traMADol 50 MG Oral Tab Please take 1 tablet every 6 hours as needed for pain. Max of 8 tablets per day. Collaborating -   Jennifer Sandoval. (Patient not taking: Reported on 12/18/2024) 20 tablet 1    ondansetron (ZOFRAN) 4 mg tablet Take 1 tablet by mouth every 8 hours as needed for nausea. (Patient not taking: Reported on 12/18/2024) 8 tablet 0    fluticasone-umeclidin-vilant (TRELEGY ELLIPTA) 200-62.5-25 MCG/ACT Inhalation Aerosol Powder, Breath Activated Inhale 1 puff into the lungs daily. 60 each 5    budesonide 0.5 MG/2ML Inhalation Suspension Take 2 mL (0.5 mg total) by nebulization daily. 120 mL 5    albuterol 108 (90 Base) MCG/ACT Inhalation Aero Soln Inhale 1 puff into the lungs every 6 (six) hours as needed. 1 each 1    gabapentin 100 MG Oral Cap Take 3 capsules (300 mg total) by mouth nightly. 100mg at bedtime x 1 week then increase to 200mg at bedtime x 1 week then 300mg at bedtime 90 capsule 2    FLOVENT  MCG/ACT Inhalation Aerosol Inhale 2 puffs into the lungs 2 (two) times daily. 1 each 3    Respiratory Therapy Supplies (NEBULIZER) Does not apply Device Please use every 6 hour as needed for Shortness of breath, wheezing 1 Device 0       Allergies:  Allergies[1]    Review of Systems  A comprehensive review of systems was completed and is negative unless noted above or in the HPI.    Physical Exam  Ht 5' 11\" (1.803 m)   Wt 192 lb 12.8 oz (87.5 kg)   BMI 26.89 kg/m²   Body mass index is 26.89 kg/m².    Constitutional: The patient appears well-developed and well-nourished, in no apparent distress.   Psychiatric: The patient demonstrates good comprehension, judgment and decision making. Normal mood and affect.  Eyes: PER and EOM are normal.  ENT: Hearing appropriate for normal conversation.  Cardiovascular: The patient has symmetric pulses, 2+.  Distal extremity is warm and well perfused with good capillary refill.  Respiratory:  The patient is breathing comfortably without increased respiratory effort or use of accessory muscles.  Hematologic/Lymphatic: No lymphangitis. There is no appreciable enlargement of lymph nodes.  No calf swelling, calf non-tender, negative Almonte's sign. No edema.  Skin: No wounds or ulcers. No hypertrophic scarring.  Neck: FROM without pain.  MSK:  Gait: Nonantalgic    bilateral Knee        Alignment: Neutral, no obvious deformity       Skin: Intact, no lesion       Swelling: None   Effusion: Trace   Tenderness: Medial joint line and Patella       Range of Motion:      Extension: 0     Flexion: 130     Flexion Contracture: 0     Extensor La           McMurrays: Negative   Lachmann: Negative   Anterior Drawer: Negative   Posterior Drawer: Negative   Varus Stress Test: Negative   Valgus Stress Test: Negative       Patellar Tracking: Centrally   Patellar Crepitus: No   Extensor Mechanism: Intact       Hip ROM:   Limited internal rotation to about 5 degrees with pain into the knees.       Sensation: intact to light touch   Motor: intact   Vascular: intact     Imaging:  I independently reviewed the patient's relevant imaging studies today.    4 weightbearing views of the affected knee were obtained today.  They demonstrate no acute fracture or dislocation.  They show mild medial compartment joint space narrowing, osteophyte formation consistent with Kellgren-Alex grade 2 changes.    Labs:  Lab Results   Component Value Date    WBC 7.9 08/15/2024    HGB 13.2 08/15/2024    HCT 38.4 (L) 08/15/2024    .0 08/15/2024     Lab Results   Component Value Date    ALB 3.8 2024     Lab Results   Component Value Date     (H) 2024     (H) 2023     (H) 2021       Assessment and Plan  Assessment & Plan  Knee pain, unspecified chronicity, unspecified laterality    Orders:    XR KNEE COMPLETE BILAT (CPT=73564); Future    MISC [place] PROC FOR MANAGED CARE AUTH    I had a lengthy discussion with the patient today about the patient's knee OA.  Nonoperative and operative options for knee OA were discussed with the patient at length. Nonoperative measures include activity  modification, anti-inflammatories, weight loss if applicable, physical therapy or a home exercise program, bracing, cortisone injections and viscosupplementation injections.  Risks and benefits to injections were reviewed including but not limited to infection, skin discoloration/changes, temporary elevations in blood sugars, joint inflammatory reaction, persistent pain or increased pain at the injection site, and the chance that the injection may not help. If they fail these measures, and continue to suffer from functionally limiting pain that is negatively impacting their quality of life, a knee replacement can be considered.     Specifically in Devin's case, I would not recommend joint replacement at this time.  His weightbearing radiographs do not demonstrate bone-on-bone and his pain is mild at this time.  As such, I recommend continuation of a conservative treatment approach.  Will discussed cortisone versus HA.  He did have some relief with HA injections in the past and so we will attempt this once again.    Specifically, I recommend the following:    Will seek approval for FITCH injections.  Continue modification and activities, anti-inflammatories, Tylenol, resting, and a exercise program as needed for symptomatic relief.  They were recommended low impact aerobic exercises such as biking, elliptical, and swimming/water therapy. They were recommended against high impact activities such as running on hard surfaces and deep squatting activities.  There are no structural abnormalities to their knee, so the patient can be WBAT and activities as tolerated.  Follow up: HA injections  The patient understands and agrees with this plan. All of the patient's questions were answered today.      is above average; BMI management plan is completed    Mallorie Patel MD          [1] No Known Allergies

## 2024-12-30 ENCOUNTER — APPOINTMENT (OUTPATIENT)
Dept: GENERAL RADIOLOGY | Facility: HOSPITAL | Age: 59
End: 2024-12-30
Attending: ANESTHESIOLOGY
Payer: COMMERCIAL

## 2024-12-30 ENCOUNTER — TELEPHONE (OUTPATIENT)
Facility: CLINIC | Age: 59
End: 2024-12-30

## 2024-12-30 ENCOUNTER — HOSPITAL ENCOUNTER (OUTPATIENT)
Facility: HOSPITAL | Age: 59
Setting detail: HOSPITAL OUTPATIENT SURGERY
Discharge: HOME | End: 2024-12-30
Attending: ANESTHESIOLOGY | Admitting: ANESTHESIOLOGY
Payer: COMMERCIAL

## 2024-12-30 VITALS
RESPIRATION RATE: 18 BRPM | BODY MASS INDEX: 26.04 KG/M2 | OXYGEN SATURATION: 97 % | TEMPERATURE: 98 F | DIASTOLIC BLOOD PRESSURE: 84 MMHG | SYSTOLIC BLOOD PRESSURE: 119 MMHG | WEIGHT: 186 LBS | HEART RATE: 65 BPM | HEIGHT: 71 IN

## 2024-12-30 DIAGNOSIS — J45.50 SEVERE PERSISTENT ASTHMA WITHOUT COMPLICATION (HCC): Primary | ICD-10-CM

## 2024-12-30 PROCEDURE — 3E0T33Z INTRODUCTION OF ANTI-INFLAMMATORY INTO PERIPHERAL NERVES AND PLEXI, PERCUTANEOUS APPROACH: ICD-10-PCS | Performed by: ANESTHESIOLOGY

## 2024-12-30 PROCEDURE — 62321 NJX INTERLAMINAR CRV/THRC: CPT | Performed by: ANESTHESIOLOGY

## 2024-12-30 RX ORDER — SODIUM CHLORIDE, SODIUM LACTATE, POTASSIUM CHLORIDE, CALCIUM CHLORIDE 600; 310; 30; 20 MG/100ML; MG/100ML; MG/100ML; MG/100ML
100 INJECTION, SOLUTION INTRAVENOUS CONTINUOUS
Status: DISCONTINUED | OUTPATIENT
Start: 2024-12-30 | End: 2024-12-30

## 2024-12-30 RX ORDER — NALOXONE HYDROCHLORIDE 0.4 MG/ML
0.08 INJECTION, SOLUTION INTRAMUSCULAR; INTRAVENOUS; SUBCUTANEOUS AS NEEDED
OUTPATIENT
Start: 2024-12-30

## 2024-12-30 RX ORDER — BUDESONIDE 0.5 MG/2ML
0.5 INHALANT ORAL DAILY
Qty: 120 ML | Refills: 5 | Status: SHIPPED | OUTPATIENT
Start: 2024-12-30

## 2024-12-30 RX ORDER — SODIUM CHLORIDE 9 MG/ML
INJECTION, SOLUTION INTRAMUSCULAR; INTRAVENOUS; SUBCUTANEOUS
Status: DISCONTINUED | OUTPATIENT
Start: 2024-12-30 | End: 2024-12-30

## 2024-12-30 RX ORDER — LIDOCAINE HYDROCHLORIDE 10 MG/ML
INJECTION, SOLUTION EPIDURAL; INFILTRATION; INTRACAUDAL; PERINEURAL
Status: DISCONTINUED | OUTPATIENT
Start: 2024-12-30 | End: 2024-12-30

## 2024-12-30 RX ORDER — DEXAMETHASONE SODIUM PHOSPHATE 10 MG/ML
INJECTION, SOLUTION INTRAMUSCULAR; INTRAVENOUS
Status: DISCONTINUED | OUTPATIENT
Start: 2024-12-30 | End: 2024-12-30

## 2024-12-30 RX ORDER — FLUTICASONE FUROATE, UMECLIDINIUM BROMIDE AND VILANTEROL TRIFENATATE 200; 62.5; 25 UG/1; UG/1; UG/1
1 POWDER RESPIRATORY (INHALATION) DAILY
Qty: 60 EACH | Refills: 5 | Status: SHIPPED | OUTPATIENT
Start: 2024-12-30

## 2024-12-30 NOTE — OPERATIVE REPORT
[facility]  Operative Report  2024     Devin Urrutia Patient Status:  Hospital Outpatient Surgery    1965 MRN [id number]   Location [facility] Attending Massimo Vera MD   Hosp Day # 0 PCP Braxton Rivas MD     Indication: Devin is a 59 year old male with cervical radiculitis    Preoperative Diagnosis:  Cervical radiculitis [M54.12]    Postoperative Diagnosis: Same as above.    Procedure performed: CERVICAL EPIDURAL STEROID INJECTION with local    Anesthesia: Local      EBL: Less than 1 ml.    Procedure Description:  After reviewing the patient's history and performing a focused physical examination, the diagnosis was confirmed and contraindications such as infection and coagulopathy were ruled out.  Following review of allergies, potential side effects, and complications, including but not necessarily limited to infection, allergic reaction, local tissue breakdown, nerve injury, post-dural puncture headache and paresis, the patient indicated they understood and agreed to proceed.  After obtaining the informed consent, the patient was brought to the procedure room and monitored.      The patient was brought to the procedure room and positioned prone.  After comprehensive monitors were applied, the patient's neck was prepped and draped sterilely.  After local anesthetic was instilled in the skin and subcutaneous tissue, a 20-gauge Tuohy needle was introduced and advanced under fluoroscopy at C7-T1.  The epidural space was reached by using a loss of resistance to air technique. There was no C.S.F. or blood through the needle. After obtaining a good epidurogram by injecting Omnipaque 180 1 mL, a combination of normal saline and dexamethasone 10 mg in total volume of 4 mL was injected.  The needle was then flushed with normal saline 1 mL.  The stylet re-applied.  The needle was withdrawn with the tip intact.  The patient tolerated the procedure very well and recovered  and was discharged to a responsible adult after discharge criteria were met.        Complications: None.    Follow up:  The patient was followed in the pain clinic as needed basis.          Massimo Vera MD

## 2024-12-30 NOTE — TELEPHONE ENCOUNTER
Pt pharmacy change and the medication needs   refill for trelogy  and budesonide.    [address]  [address]    Phone number   [phone]

## 2024-12-30 NOTE — TELEPHONE ENCOUNTER
Pt last seen by Dr. Vieyra on 9-24-24.  Per office visit notes:  Controlled on trelegy, montelukast, and albuterol.  If breathing worsens from here would add budesonide neb back  Pt advised to follow up in 6 months and appt scheduled for 3-25-25.  Refills for Trelegy and Budesonide sent to pharmacy.

## 2024-12-30 NOTE — H&P
History & Physical Examination    Patient Name: Devin Urrutia  MRN: [id number]  CSN: [id number]  YOB: 1965    Pre-Operative Diagnosis:  Cervical radiculitis [M54.12]    Present Illness: Cervical radiculitis    ASA: 2  MP class: 1  Sedation: Local      Prescriptions Prior to Admission[1]  Current Facility-Administered Medications   Medication Dose Route Frequency    lactated ringers infusion  100 mL/hr Intravenous Continuous       Allergies: Allergies[2]    Past Medical History:    Allergic rhinitis    Asthma (HCC)    High cholesterol    Hyperlipidemia    Visual impairment     Past Surgical History:   Procedure Laterality Date    Arthroscopy of joint unlisted      Colonoscopy       Family History   Problem Relation Age of Onset    Heart Disorder Father     Hypertension Mother     Heart Disorder Mother     Asthma Mother      Social History     Tobacco Use    Smoking status: Former     Current packs/day: 0.00     Average packs/day: 0.5 packs/day for 25.0 years (12.5 ttl pk-yrs)     Types: Cigarettes     Start date:      Quit date:      Years since quittin.0     Passive exposure: Past    Smokeless tobacco: Former    Tobacco comments:     N   Substance Use Topics    Alcohol use: Never       SYSTEM Check if Review is Normal Check if Physical Exam is Normal If not normal, please explain:   HEENT [x ] [x ]    NECK & BACK [x ] [x ]    HEART [x ] [x ]    LUNGS [x ] [x ]    ABDOMEN [x ] [x ]    UROGENITAL [x ] [x ]    EXTREMITIES [x ] [x ]    OTHER        [ x ] I have discussed the risks and benefits and alternatives with the patient/family.  They understand and agree to proceed with plan of care.  [ x ] I have reviewed the History and Physical done within the last 30 days.  Any changes noted above.    Massimo Vera MD              [1]   Medications Prior to Admission   Medication Sig Dispense Refill Last Dose/Taking    atorvastatin 10 MG Oral Tab Take 1 tablet (10 mg total) by mouth nightly. 90  tablet 1     [] amoxicillin clavulanate 875-125 MG Oral Tab Take 1 tablet by mouth 2 (two) times daily for 10 days. 20 tablet 0     methylPREDNISolone (MEDROL) 4 MG Oral Tablet Therapy Pack As directed. (Patient not taking: Reported on 2024) 1 each 0     Meloxicam 15 MG Oral Tab Take 1 tablet (15 mg total) by mouth daily. (Patient not taking: Reported on 2024) 90 tablet 0     gabapentin 300 MG Oral Cap One capsule at bedtime 90 capsule 1     Olopatadine HCl 0.2 % Ophthalmic Solution Apply 1 Application to eye 2 (two) times daily as needed. 2.5 mL 1     montelukast 10 MG Oral Tab Take 1 tablet (10 mg total) by mouth nightly. 90 tablet 3     alfuzosin ER 10 MG Oral Tablet 24 Hr Take 1 tablet (10 mg total) by mouth daily. 90 tablet 5     traMADol 50 MG Oral Tab Please take 1 tablet every 6 hours as needed for pain. Max of 8 tablets per day. Collaborating - Dr. Jennifer Sandoval. (Patient not taking: Reported on 2024) 20 tablet 1     ondansetron (ZOFRAN) 4 mg tablet Take 1 tablet by mouth every 8 hours as needed for nausea. (Patient not taking: Reported on 2024) 8 tablet 0     fluticasone-umeclidin-vilant (TRELEGY ELLIPTA) 200-62.5-25 MCG/ACT Inhalation Aerosol Powder, Breath Activated Inhale 1 puff into the lungs daily. 60 each 5     budesonide 0.5 MG/2ML Inhalation Suspension Take 2 mL (0.5 mg total) by nebulization daily. 120 mL 5     albuterol 108 (90 Base) MCG/ACT Inhalation Aero Soln Inhale 1 puff into the lungs every 6 (six) hours as needed. 1 each 1     gabapentin 100 MG Oral Cap Take 3 capsules (300 mg total) by mouth nightly. 100mg at bedtime x 1 week then increase to 200mg at bedtime x 1 week then 300mg at bedtime 90 capsule 2     FLOVENT  MCG/ACT Inhalation Aerosol Inhale 2 puffs into the lungs 2 (two) times daily. 1 each 3     Respiratory Therapy Supplies (NEBULIZER) Does not apply Device Please use every 6 hour as needed for Shortness of breath, wheezing 1 Device 0    [2] No  Known Allergies     Male

## 2024-12-30 NOTE — DISCHARGE INSTRUCTIONS
Home Care Instructions Following Your Pain Procedure     Devin,  It has been a pleasure to have you as our patient. To help you at home, you must follow these general discharge instructions. We will review these with you before you are discharged. It is our hope that you have a complete and uneventful recovery from our procedure.     General Instructions:  What to Expect:  Bandages from your procedure today can be removed when you get home.  Please avoid soaking and/or swimming for 24 hours.  Showering is okay  It is normal to have increased pain symptoms and/or pain at injection site for up to 3-5 days after procedure, you can use heat or ice (20 minutes on 20 minutes off) for comfort.  You may experience some temporary side effects which may include restlessness or insomnia, flushing of the face, or heart palpitations.  Please contact the provider if these symptoms do not resolve within 3-4 days.  Lightheadedness or nausea may occur and should resolve within 24 to 48 hours.  If you develop a headache after treatment, rest, drink fluids (with caffeine, if possible) and take mild over-the-counter pain medication.  If the headache does not improve with the above treatment, contact the physician.  Home Medications:  Resume all previously prescribed medication.  Please avoid taking NSAIDs (Non-Steriodal Anti-Inflammatory Drugs) such as:  Ibuprofen ( Advil, Motrin) Aleve (Naproxen), Diclofenac, Meloxicam for 6 hours after procedure.   If you are on Coumadin (Warfarin) or any other anti-coagulant (or \"blood thinning\") medication such as Plavix (Clopidogrel), Xarelto (Rivaroxaban), Eliquis (Apixaban), Effient (Prasugrel) etc., restart on the following day from the procedure unless otherwise directed by your provider.  If you are a diabetic, please increase the frequency of your glucose monitoring after the procedure as steroids may cause a temporary (2-3 day) increase in your blood sugar.  Contact your primary care  physician if your blood sugar remains elevated as you may require some medication adjustment.  Diet:  Resume your regular diet as tolerated.  Activity:  We recommend that you relax and rest during the rest of your procedure day.  If you feel weakness in your arms or legs do not drive.  Follow-up Appointment  Please schedule a follow-up visit within 3 to 4 weeks after your last procedure date.  Question or Concerns:  Feel free to call our office with any questions or concerns at [phone] (option #2)    Devin  Thank you for coming to [facility] for your procedure.  The nurses try very hard to make sure you receive the best care possible.  Your trust in them as well as us is greatly appreciated.    Thanks so much,   Dr. Massimo Vera

## 2024-12-31 ENCOUNTER — HOSPITAL ENCOUNTER (OUTPATIENT)
Dept: MRI IMAGING | Age: 59
Discharge: HOME | End: 2024-12-31
Attending: ORTHOPAEDIC SURGERY
Payer: COMMERCIAL

## 2024-12-31 DIAGNOSIS — S46.001A INJURY OF RIGHT ROTATOR CUFF, INITIAL ENCOUNTER: ICD-10-CM

## 2024-12-31 PROCEDURE — 73221 MRI JOINT UPR EXTREM W/O DYE: CPT | Performed by: ORTHOPAEDIC SURGERY

## 2025-01-14 ENCOUNTER — OFFICE VISIT (OUTPATIENT)
Dept: PAIN CLINIC | Facility: CLINIC | Age: 60
End: 2025-01-14
Payer: COMMERCIAL

## 2025-01-14 VITALS
SYSTOLIC BLOOD PRESSURE: 110 MMHG | BODY MASS INDEX: 27 KG/M2 | OXYGEN SATURATION: 96 % | WEIGHT: 192 LBS | DIASTOLIC BLOOD PRESSURE: 78 MMHG | HEART RATE: 74 BPM

## 2025-01-14 DIAGNOSIS — M48.02 FORAMINAL STENOSIS OF CERVICAL REGION: Primary | ICD-10-CM

## 2025-01-14 PROCEDURE — 3074F SYST BP LT 130 MM HG: CPT | Performed by: PHYSICIAN ASSISTANT

## 2025-01-14 PROCEDURE — 99214 OFFICE O/P EST MOD 30 MIN: CPT | Performed by: PHYSICIAN ASSISTANT

## 2025-01-14 PROCEDURE — 3078F DIAST BP <80 MM HG: CPT | Performed by: PHYSICIAN ASSISTANT

## 2025-01-14 NOTE — PROGRESS NOTES
Last procedure: CERVICAL EPIDURAL STEROID INJECTION with local   Date: 12/30/24  Percentage of relief obtained: 90%  Duration of relief: current    Current Pain Score: 4

## 2025-01-14 NOTE — PATIENT INSTRUCTIONS
Refill policies:    Allow 2-3 business days for refills; controlled substances may take longer.  Contact your pharmacy at least 5 days prior to running out of medication and have them send an electronic request or submit request through the “request refill” option in your [employer] account.  Refills are not addressed on weekends; covering physicians do not authorize routine medications on weekends.  No narcotics or controlled substances are refilled after noon on Fridays or by on call physicians.  By law, narcotics must be electronically prescribed.  A 30 day supply with no refills is the maximum allowed.  If your prescription is due for a refill, you may be due for a follow up appointment.  To best provide you care, patients receiving routine medications need to be seen at least once a year.  Patients receiving narcotic/controlled substance medications need to be seen at least once every 3 months.  In the event that your preferred pharmacy does not have the requested medication in stock (e.g. Backordered), it is your responsibility to find another pharmacy that has the requested medication available.  We will gladly send a new prescription to that pharmacy at your request.    Scheduling Tests:    If your physician has ordered radiology tests such as MRI or CT scans, please contact Central Scheduling at [phone] right away to schedule the test.  Once scheduled, the [facility] Centralized Referral Team will work with your insurance carrier to obtain pre-certification or prior authorization.  Depending on your insurance carrier, approval may take 3-10 days.  It is highly recommended patients assure they have received an authorization before having a test performed.  If test is done without insurance authorization, patient may be responsible for the entire amount billed.      Precertification and Prior Authorizations:  If your physician has recommended that you have a procedure or additional testing performed the [facility]  Centralized Referral Team will contact your insurance carrier to obtain pre-certification or prior authorization.    You are strongly encouraged to contact your insurance carrier to verify that your procedure/test has been approved and is a COVERED benefit.  Although the [facility] Centralized Referral Team does its due diligence, the insurance carrier gives the disclaimer that \"Although the procedure is authorized, this does not guarantee payment.\"    Ultimately the patient is responsible for payment.   Thank you for your understanding in this matter.  Paperwork Completion:  If you require FMLA or disability paperwork for your recovery, please make sure to either drop it off or have it faxed to our office at [phone]. Be sure the form has your name and date of birth on it.  The form will be faxed to our Forms Department and they will complete it for you.  There is a 25$ fee for all forms that need to be filled out.  Please be aware there is a 10-14 day turnaround time.  You will need to sign a release of information (SKYLER) form if your paperwork does not come with one.  You may call the Forms Department with any questions at [phone].  Their fax number is [phone].

## 2025-01-14 NOTE — PROGRESS NOTES
HPI:   Devin Urrutia presents with complaints of R shoulder pain.    The pain is described as moderate aching that is intermittent.  The patient’s activity level has remained the same since last visit.  The pain is worst unrelated to time of day.    Changes in condition/history since last visit: Patient is here today for follow-up after initial PARMJIT on 12/30/2024.  Procedure was well-tolerated and had no adverse effects.  Overall, reports 90% sustained improvement, and is very pleased with his response.    With regards to his shoulder, he has been evaluated by Ortho which did show a small full-thickness supraspinatus tear.  He had been asked by Ortho to follow-up to discuss options.    Last procedure: PARMJIT #1    date: 12/30/2024    Percentage of relief experienced from the procedure: 90%    Duration of the relief: Sustained    The following activities will increase the patient’s pain: nothing specific    The following activities decrease the patient’s pain: limiting activity level    Functional Assessment: Patient reports that they are able to complete all of their ADL's such as eating, bathing, using the toilet, dressing and getting up from a bed or a chair independently.    Current Medications:  Current Outpatient Medications   Medication Sig Dispense Refill    fluticasone-umeclidin-vilant (TRELEGY ELLIPTA) 200-62.5-25 MCG/ACT Inhalation Aerosol Powder, Breath Activated Inhale 1 puff into the lungs daily. 60 each 5    budesonide 0.5 MG/2ML Inhalation Suspension Take 2 mL (0.5 mg total) by nebulization daily. 120 mL 5    atorvastatin 10 MG Oral Tab Take 1 tablet (10 mg total) by mouth nightly. 90 tablet 1    methylPREDNISolone (MEDROL) 4 MG Oral Tablet Therapy Pack As directed. 1 each 0    Meloxicam 15 MG Oral Tab Take 1 tablet (15 mg total) by mouth daily. 90 tablet 0    gabapentin 300 MG Oral Cap One capsule at bedtime 90 capsule 1    Olopatadine HCl 0.2 % Ophthalmic Solution Apply 1 Application to eye 2 (two)  times daily as needed. 2.5 mL 1    montelukast 10 MG Oral Tab Take 1 tablet (10 mg total) by mouth nightly. 90 tablet 3    alfuzosin ER 10 MG Oral Tablet 24 Hr Take 1 tablet (10 mg total) by mouth daily. 90 tablet 5    traMADol 50 MG Oral Tab Please take 1 tablet every 6 hours as needed for pain. Max of 8 tablets per day. Collaborating - Dr. Jennifer Sandoval. 20 tablet 1    ondansetron (ZOFRAN) 4 mg tablet Take 1 tablet by mouth every 8 hours as needed for nausea. 8 tablet 0    albuterol 108 (90 Base) MCG/ACT Inhalation Aero Soln Inhale 1 puff into the lungs every 6 (six) hours as needed. 1 each 1    gabapentin 100 MG Oral Cap Take 3 capsules (300 mg total) by mouth nightly. 100mg at bedtime x 1 week then increase to 200mg at bedtime x 1 week then 300mg at bedtime 90 capsule 2    Respiratory Therapy Supplies (NEBULIZER) Does not apply Device Please use every 6 hour as needed for Shortness of breath, wheezing 1 Device 0      Patient requires assistance with: No assistance required    Reviewed Patient History Dated: 12/30/24 no changes noted    Physical Exam:   /78   Pulse 74   Wt 192 lb (87.1 kg)   SpO2 96%   BMI 26.78 kg/m²   VAS Pain Score:  4/10  General Appearance: Well developed, well nourished, normal build, independent body habitus, no apparent physical disabilities, well groomed    Neurological Exam: WNL-Orientation to time, place and person, normal mood & effect, normal concentration & attention span  Inspection: non-antalgic, no acute distress   Radiology/Lab Test Reviewed: MRI C spine:     CERVICAL DISC LEVELS:   C2-C3:  No significant disc/facet abnormality, spinal stenosis, or foraminal narrowing.   C3-C4:  Minimal posterior disc bulge.  No significant central canal stenosis.  Facet/uncovertebral hypertrophy contribute to mild/moderate bilateral foraminal stenosis.   C4-C5:  No significant disc/facet abnormality, spinal stenosis, or foraminal narrowing.   C5-C6:  Mild diffuse disc bulge abuts the  ventral thecal sac without significant central canal stenosis.  Facet and uncovertebral hypertrophy contribute to mild/moderate bilateral foraminal stenosis.   C6-C7:  Mild diffuse disc bulge abuts the ventral thecal sac without significant central canal stenosis.  Facet and uncovertebral hypertrophy contribute to moderate bilateral foraminal stenosis.   C7-T1:  No significant disc/facet abnormality, spinal stenosis, or foraminal narrowing.     MRI R shoulder:    CONCLUSION:    1. Findings consistent with extrinsic rotator cuff impingement relating to a type 2 acromion and subacromial enthesopathy.   2. Associated rotator cuff tendinopathy and enthesopathy with small focal full-thickness tear along the anterior distal supraspinatus tendon measuring 1.6 x 0.6 cm.   3. Degenerative labral tearing suggested along the anterior inferior quadrant as described above.  The biceps labral anchor has a normal appearance.   4. Please see further details above.       Lab Results   Component Value Date    WBC 7.9 08/15/2024    WBC 7.2 01/23/2024    WBC 9.5 02/07/2023   No results found for: \"HEMOGLOBIN\"  Lab Results   Component Value Date    .0 08/15/2024    .0 01/23/2024    .0 02/07/2023     Do you have any known blood/bleeding disorders?  No  Does patient currently take blood thinners?   None  Does patient currently take any antibiotics?   No  Patient educated and verbalized understanding.  Medical Decision Making:   Diagnosis:    Encounter Diagnosis   Name Primary?    Foraminal stenosis of cervical region Yes     Impression: 90% resolution of neck pain following PARMJIT #1 and is very pleased with his response.  He did go for orthopedic evaluation for his right shoulder pain which did show a small full-thickness supraspinatus tear.  He was asked to follow-up with Ortho and I have encouraged him to do so.    Plan: Patient to follow up PRN.  If cervical pain rapidly returns can simply contact clinic and we will  be happy to place order for repeat procedure.  Follow-up with Ortho for his right shoulder findings on MRI.    No orders of the defined types were placed in this encounter.      Meds & Refills for this Visit:  Requested Prescriptions      No prescriptions requested or ordered in this encounter       Imaging & Consults:  None    The patient indicates understanding of these issues and agrees to the plan.    WAYNE Chilel

## 2025-01-17 ENCOUNTER — TELEPHONE (OUTPATIENT)
Dept: FAMILY MEDICINE CLINIC | Facility: CLINIC | Age: 60
End: 2025-01-17

## 2025-01-17 DIAGNOSIS — M15.0 PRIMARY OSTEOARTHRITIS INVOLVING MULTIPLE JOINTS: Primary | ICD-10-CM

## 2025-01-22 ENCOUNTER — OFFICE VISIT (OUTPATIENT)
Dept: FAMILY MEDICINE CLINIC | Facility: CLINIC | Age: 60
End: 2025-01-22
Payer: COMMERCIAL

## 2025-01-22 VITALS
OXYGEN SATURATION: 98 % | WEIGHT: 193 LBS | HEART RATE: 79 BPM | RESPIRATION RATE: 16 BRPM | DIASTOLIC BLOOD PRESSURE: 74 MMHG | HEIGHT: 71 IN | BODY MASS INDEX: 27.02 KG/M2 | SYSTOLIC BLOOD PRESSURE: 122 MMHG | TEMPERATURE: 98 F

## 2025-01-22 DIAGNOSIS — L60.9 RIDGED NAILS: ICD-10-CM

## 2025-01-22 DIAGNOSIS — J32.9 CHRONIC SINUSITIS, UNSPECIFIED LOCATION: ICD-10-CM

## 2025-01-22 DIAGNOSIS — M79.18 MYOFASCIAL PAIN DYSFUNCTION SYNDROME: ICD-10-CM

## 2025-01-22 DIAGNOSIS — J45.30 MILD PERSISTENT ASTHMA WITHOUT COMPLICATION (HCC): ICD-10-CM

## 2025-01-22 DIAGNOSIS — M75.121 COMPLETE TEAR OF RIGHT ROTATOR CUFF, UNSPECIFIED WHETHER TRAUMATIC: ICD-10-CM

## 2025-01-22 DIAGNOSIS — J30.1 NON-SEASONAL ALLERGIC RHINITIS DUE TO POLLEN: Primary | ICD-10-CM

## 2025-01-22 PROCEDURE — 99215 OFFICE O/P EST HI 40 MIN: CPT | Performed by: FAMILY MEDICINE

## 2025-01-22 PROCEDURE — G2211 COMPLEX E/M VISIT ADD ON: HCPCS | Performed by: FAMILY MEDICINE

## 2025-01-22 PROCEDURE — 3078F DIAST BP <80 MM HG: CPT | Performed by: FAMILY MEDICINE

## 2025-01-22 PROCEDURE — 3074F SYST BP LT 130 MM HG: CPT | Performed by: FAMILY MEDICINE

## 2025-01-22 PROCEDURE — 3008F BODY MASS INDEX DOCD: CPT | Performed by: FAMILY MEDICINE

## 2025-01-22 RX ORDER — ATORVASTATIN CALCIUM 10 MG/1
10 TABLET, FILM COATED ORAL NIGHTLY
Qty: 90 TABLET | Refills: 3 | Status: SHIPPED | OUTPATIENT
Start: 2025-01-22

## 2025-01-22 RX ORDER — MONTELUKAST SODIUM 10 MG/1
10 TABLET ORAL NIGHTLY
Qty: 90 TABLET | Refills: 3 | Status: SHIPPED | OUTPATIENT
Start: 2025-01-22

## 2025-01-22 RX ORDER — ALBUTEROL SULFATE 90 UG/1
1 INHALANT RESPIRATORY (INHALATION) EVERY 6 HOURS PRN
Qty: 3 EACH | Refills: 3 | Status: SHIPPED | OUTPATIENT
Start: 2025-01-22

## 2025-01-22 RX ORDER — ALBUTEROL SULFATE 90 UG/1
1 INHALANT RESPIRATORY (INHALATION) EVERY 6 HOURS PRN
Qty: 1 EACH | Refills: 2 | Status: SHIPPED | OUTPATIENT
Start: 2025-01-22 | End: 2025-01-22

## 2025-01-22 RX ORDER — ATORVASTATIN CALCIUM 10 MG/1
10 TABLET, FILM COATED ORAL NIGHTLY
Qty: 90 TABLET | Refills: 1 | Status: SHIPPED | OUTPATIENT
Start: 2025-01-22 | End: 2025-01-22

## 2025-01-22 NOTE — PROGRESS NOTES
Subjective:   Patient ID: Devin Urrutia is a 59 year old male.    HPI  Mr. Urrutia is a pleasant 59-year-old gentleman with history of asthma, hypothyroidism, hyperlipidemia, arthritis, sinusitis, allergic rhinitis presenting today for  Nasal and chest congestion.  He will need refills for his allergy medications.  No fever no chest pain no shortness of breath no nausea no vomiting no abdominal pain.  He has mild cough.  He will need updated referrals for by specialist that had referred him last year.      I had reviewed past medical and family histories together with allergy and medication lists documented.    History/Other:   Review of Systems   Constitutional:  Negative for fatigue and fever.   HENT:  Positive for congestion. Negative for ear pain, sore throat and trouble swallowing.    Respiratory:  Positive for cough. Negative for shortness of breath.    Cardiovascular:  Negative for chest pain and palpitations.   Gastrointestinal:  Negative for abdominal pain, diarrhea, nausea and vomiting.   Musculoskeletal:  Positive for arthralgias. Negative for back pain.   Neurological:  Negative for dizziness, weakness and headaches.     Current Outpatient Medications   Medication Sig Dispense Refill    montelukast 10 MG Oral Tab Take 1 tablet (10 mg total) by mouth nightly. 90 tablet 3    atorvastatin 10 MG Oral Tab Take 1 tablet (10 mg total) by mouth nightly. 90 tablet 3    albuterol 108 (90 Base) MCG/ACT Inhalation Aero Soln Inhale 1 puff into the lungs every 6 (six) hours as needed. 3 each 3    amoxicillin clavulanate 875-125 MG Oral Tab Take 1 tablet by mouth 2 (two) times daily for 10 days. 20 tablet 0    fluticasone-umeclidin-vilant (TRELEGY ELLIPTA) 200-62.5-25 MCG/ACT Inhalation Aerosol Powder, Breath Activated Inhale 1 puff into the lungs daily. 60 each 5    budesonide 0.5 MG/2ML Inhalation Suspension Take 2 mL (0.5 mg total) by nebulization daily. 120 mL 5    Meloxicam 15 MG Oral Tab Take 1 tablet (15 mg total)  by mouth daily. 90 tablet 0    gabapentin 300 MG Oral Cap One capsule at bedtime 90 capsule 1    Olopatadine HCl 0.2 % Ophthalmic Solution Apply 1 Application to eye 2 (two) times daily as needed. 2.5 mL 1    alfuzosin ER 10 MG Oral Tablet 24 Hr Take 1 tablet (10 mg total) by mouth daily. 90 tablet 5    traMADol 50 MG Oral Tab Please take 1 tablet every 6 hours as needed for pain. Max of 8 tablets per day. Collaborating - Dr. Jennifer Sandoval. 20 tablet 1    gabapentin 100 MG Oral Cap Take 3 capsules (300 mg total) by mouth nightly. 100mg at bedtime x 1 week then increase to 200mg at bedtime x 1 week then 300mg at bedtime 90 capsule 2    Respiratory Therapy Supplies (NEBULIZER) Does not apply Device Please use every 6 hour as needed for Shortness of breath, wheezing 1 Device 0     Allergies:Allergies[1]    Objective:   Physical Exam  Vitals reviewed.   Constitutional:       General: He is not in acute distress.  HENT:      Right Ear: Tympanic membrane and ear canal normal.      Left Ear: Tympanic membrane and ear canal normal.      Nose: No congestion or rhinorrhea.      Mouth/Throat:      Mouth: Mucous membranes are moist.      Pharynx: Oropharynx is clear. No oropharyngeal exudate or posterior oropharyngeal erythema.   Eyes:      General: No scleral icterus.        Right eye: No discharge.         Left eye: No discharge.      Conjunctiva/sclera: Conjunctivae normal.   Cardiovascular:      Rate and Rhythm: Normal rate and regular rhythm.      Heart sounds: Normal heart sounds. No murmur heard.  Pulmonary:      Effort: Pulmonary effort is normal. No respiratory distress.      Breath sounds: Normal breath sounds. No wheezing or rales.   Abdominal:      General: Bowel sounds are normal. There is no distension.      Palpations: Abdomen is soft. There is no mass.      Tenderness: There is no abdominal tenderness.   Musculoskeletal:      Cervical back: Neck supple.      Right lower leg: No edema.      Left lower leg: No  edema.   Lymphadenopathy:      Cervical: No cervical adenopathy.   Skin:     General: Skin is warm.   Neurological:      Mental Status: He is alert.   Psychiatric:         Mood and Affect: Mood normal.         Behavior: Behavior normal.         Assessment & Plan:   1. Non-seasonal allergic rhinitis due to pollen   -Continue current medication regimen  - Continue follow-up with allergist   2. Chronic sinusitis, unspecified location   -Continue current meds  - Please see #1  - We will go ahead and treat with Augmentin   3. Mild persistent asthma without complication (HCC)   -Stable  - Continue meds  - Continue follow-up with pulmonology   4. Complete tear of right rotator cuff, unspecified whether traumatic   -Stable  That he does have an appointment with Ortho and will await recommendations   5. Ridged nails   -Perhaps due to underlying asthma  - Referral to dermatology reordered   6. Myofascial pain dysfunction syndrome   -Stable  - Continue follow-up with rheumatology   Follow-up in 6 months or as needed for his next wellness exam      This note was prepared using Dragon Medical voice recognition dictation software. As a result errors may occur. When identified these errors have been corrected. While every attempt is made to correct errors during dictation discrepancies may still exist            No orders of the defined types were placed in this encounter.      Meds This Visit:  Requested Prescriptions     Signed Prescriptions Disp Refills    montelukast 10 MG Oral Tab 90 tablet 3     Sig: Take 1 tablet (10 mg total) by mouth nightly.    atorvastatin 10 MG Oral Tab 90 tablet 3     Sig: Take 1 tablet (10 mg total) by mouth nightly.    albuterol 108 (90 Base) MCG/ACT Inhalation Aero Soln 3 each 3     Sig: Inhale 1 puff into the lungs every 6 (six) hours as needed.    amoxicillin clavulanate 875-125 MG Oral Tab 20 tablet 0     Sig: Take 1 tablet by mouth 2 (two) times daily for 10 days.       Imaging &  Referrals:  ORTHOPEDIC - INTERNAL  DERM - INTERNAL  RHEUMATOLOGY - INTERNAL         [1] No Known Allergies

## 2025-01-22 NOTE — PATIENT INSTRUCTIONS
Thank you for choosing Braxton Rivas MD at [facility]  To Do: Devin Urrutia  1. Please see below   Call [phone] to schedule the appointment.   Please signup for [employer], which is electronic access to your record if you have not done so.  All your results will post on there.  [email or web address]   You can NOW use [employer] to book your appointments with us, or consider using open access scheduling which is through the [employer] website [email or web address] and type in Braxton Rivas MD and follow the links for \"Schedule Online Now\"    To schedule Imaging or tests at [facility] call Central Scheduling [phone], Go to [facility] A ER Building (For example: CT scans, X rays, Ultrasound, MRI)  Cardiac Testing in ER building Building A second floor Cardiac Testing [phone] (For example: Holter Monitor, Cardiac Stress tests,Event Monitor, or 2D Echocardiograms)  [facility] call [phone] usually in [facility]  Walk in Clinic in [place] at [address] Mon-Fri at 8am-7:30 p.m., and Sat/Sun 9:00a.m.-4:30 p.m.  Also at [address] [place] [address]  Call [phone] for info     Please call our office about any questions regarding your treatment/medicines/tests as a result of today's visit.  For your safety, read the entire package insert of all medicines prescribed to you and be aware of all of the risks of treatment even beyond those discussed today.  All therapies have potential risk of harm or side effects or medication interactions.  It is your duty and for your safety to discuss with the pharmacist and our office with questions, and to notify us and stop treatment if problems arise, but know that our intention is that the benefits outweigh those potential risks and we strive to make you healthier and to improve your quality of life.    Referrals, and Radiology Information:    If your insurance requires a referral to a specialist, please allow 5 business days to process your  referral request.    If Braxton Rivas MD orders a CT or MRI, it may take up to 10 business days to receive approval from your insurance company. Once our office has called informing you that the insurance company approved your testing, please call Central Scheduling at [phone]  Please allow our office 5 business days to contact you regarding any testing results.    Refill policies:   Allow 3 business days for refills; controlled substances may take longer and must be picked up from the office in person.  Narcotic medications can only be filled in 30 day increments and must be refilled at an office visit only.  If your prescription is due for a refill, you may be due for a follow-up appointment.  We cannot refill your maintenance medications at a preventative wellness visit.  To best provide you care, patients receiving maintenance medications need to be seen at least twice a year.

## 2025-01-31 ENCOUNTER — OFFICE VISIT (OUTPATIENT)
Dept: ORTHOPEDICS CLINIC | Facility: CLINIC | Age: 60
End: 2025-01-31
Payer: COMMERCIAL

## 2025-01-31 DIAGNOSIS — S43.431A SUPERIOR GLENOID LABRUM LESION OF RIGHT SHOULDER, INITIAL ENCOUNTER: ICD-10-CM

## 2025-01-31 DIAGNOSIS — M75.121 NONTRAUMATIC COMPLETE TEAR OF RIGHT ROTATOR CUFF: Primary | ICD-10-CM

## 2025-01-31 DIAGNOSIS — M75.41 SUBACROMIAL IMPINGEMENT OF RIGHT SHOULDER: ICD-10-CM

## 2025-01-31 DIAGNOSIS — M67.921 TENDINOPATHY OF RIGHT BICEPS TENDON: ICD-10-CM

## 2025-01-31 PROCEDURE — 99215 OFFICE O/P EST HI 40 MIN: CPT | Performed by: ORTHOPAEDIC SURGERY

## 2025-01-31 PROCEDURE — 20610 DRAIN/INJ JOINT/BURSA W/O US: CPT | Performed by: ORTHOPAEDIC SURGERY

## 2025-01-31 RX ORDER — KETOROLAC TROMETHAMINE 30 MG/ML
30 INJECTION, SOLUTION INTRAMUSCULAR; INTRAVENOUS ONCE
Status: COMPLETED | OUTPATIENT
Start: 2025-01-31 | End: 2025-01-31

## 2025-01-31 RX ORDER — TRIAMCINOLONE ACETONIDE 40 MG/ML
40 INJECTION, SUSPENSION INTRA-ARTICULAR; INTRAMUSCULAR ONCE
Status: COMPLETED | OUTPATIENT
Start: 2025-01-31 | End: 2025-01-31

## 2025-01-31 RX ADMIN — TRIAMCINOLONE ACETONIDE 40 MG: 40 INJECTION, SUSPENSION INTRA-ARTICULAR; INTRAMUSCULAR at 11:30:00

## 2025-01-31 RX ADMIN — KETOROLAC TROMETHAMINE 30 MG: 30 INJECTION, SOLUTION INTRAMUSCULAR; INTRAVENOUS at 11:30:00

## 2025-01-31 NOTE — PROCEDURES
Right Shoulder Glenohumeral Joint Injection    Name: Devin Urrutia   MRN: [id number]  Date: 1/31/2025     Clinical Indications:   Partial thickness Rotator Cuff Tear with symptoms refractory to conservative measures.     After informed consent, the injection site was marked, sterilized with topical chlorhexidine antiseptic, and locally anesthetized with skin refrigerant.    The patient was seated upright and the shoulder was exposed. Using sterile technique: 1 mL of 30mg/mL of Ketorolac, 2 mL of 0.5% Bupivicaine, 2 mL of 1% Lidocaine, and 1 mg of 40mg/mL of Triamcinolone (Kenalog) was injected with a Anterior approach utilizing a 22 gauge needle.  A band-aid was applied.  The patient tolerated the procedure well.        Jennifer Sandoval MD  Knee, Shoulder, & Elbow Surgery / Sports Medicine Specialist  Orthopaedic Surgery  [address] [place] [address] [id number] [place] [address].org  [email or web address]  t: [phone]  o: [phone]  f: [phone]

## 2025-01-31 NOTE — PROGRESS NOTES
OR BOOKING SHEET SHOULDER  Location: [x] [facility]   [] [facility]  Name: Devin Urrutia  MRN: [id number]   : 1965  Diagnos  [x] Nontraumatic complete tear of right rotator cuff [M75.121]  Disposition:    [x] Ambulatory  [] Overnight for GINNY  [] Overnight for observation and pain control  [] Inpatient procedure    Operative Time Required:  2 hours ([facility])   Antibiotics: 2 g cefazolin within 60 minutes of surgical incision  Procedure:   Laterality: [x] RIGHT [] LEFT                  [] BILATERAL  Procedures:   [x] Shoulder Arthroscopy    [x] Rotator Cuff Repair (84096)  [x] Arthroscopic Biceps Tenodesis (34997)  [x] Subacromial Decompression (46618)  [x] Distal Clavicle Excision (76425)  [x] Extensive Debridement (75937)    [] SLAP repair (14402)  [] Capsulorraphy / Labrum Repair (89641)    Additional info:   [] PCP Clearance Needed  [] MRSA  [] C-Arm  [x] TXA at time surgery  [x] Physical Therapy Internal Beti - Elisabet / Sue / Nadir  [x] DME Rx Needed  [] Appt with Dr. Sandoval needed  Implants needed: Arthrex, [place]  Positioning Equipment: Beach Chair Setup, Brain

## 2025-01-31 NOTE — H&P
Orthopaedic Surgery  [address] [place] [address]  [phone]     HISTORY AND PHYSICAL EXAMINATION     Name: Devin Urrutia   MRN: [id number]  Date: 1/31/2025     CC: Right shoulder pain and weakness in the setting of rotator cuff pathology.     HPI:   Devin Urrutia is a very pleasant 59 year old right-hand dominant male who presents today for evaluation of MRI scan of the shoulder and discussion regarding definitive management plan.     To summarize: right shoulder pain ongoing for 12 weeks. Denies any known injuries. Pain is rated up to 3/10. He has not tried any conservative treatment measures.     Patient is also well known to us for Right knee scope, abrasionaplasty extensive debridement on 08/13/2024. Overall, he seems to have continued pain despite undergoing all conservative treatment measures.    PMH:   Past Medical History:    Allergic rhinitis    Asthma (HCC)    High cholesterol    Hyperlipidemia    Visual impairment       PAST SURGICAL HX:  Past Surgical History:   Procedure Laterality Date    Arthroscopy of joint unlisted      Colonoscopy         FAMILY HX:  Family History   Problem Relation Age of Onset    Heart Disorder Father     Hypertension Mother     Heart Disorder Mother     Asthma Mother        ALLERGIES:  Patient has no known allergies.    MEDICATIONS:   Current Outpatient Medications   Medication Sig Dispense Refill    montelukast 10 MG Oral Tab Take 1 tablet (10 mg total) by mouth nightly. 90 tablet 3    atorvastatin 10 MG Oral Tab Take 1 tablet (10 mg total) by mouth nightly. 90 tablet 3    albuterol 108 (90 Base) MCG/ACT Inhalation Aero Soln Inhale 1 puff into the lungs every 6 (six) hours as needed. 3 each 3    amoxicillin clavulanate 875-125 MG Oral Tab Take 1 tablet by mouth 2 (two) times daily for 10 days. 20 tablet 0    fluticasone-umeclidin-vilant (TRELEGY ELLIPTA) 200-62.5-25 MCG/ACT Inhalation Aerosol Powder, Breath Activated Inhale 1 puff into the lungs daily. 60  each 5    budesonide 0.5 MG/2ML Inhalation Suspension Take 2 mL (0.5 mg total) by nebulization daily. 120 mL 5    Meloxicam 15 MG Oral Tab Take 1 tablet (15 mg total) by mouth daily. 90 tablet 0    gabapentin 300 MG Oral Cap One capsule at bedtime 90 capsule 1    Olopatadine HCl 0.2 % Ophthalmic Solution Apply 1 Application to eye 2 (two) times daily as needed. 2.5 mL 1    alfuzosin ER 10 MG Oral Tablet 24 Hr Take 1 tablet (10 mg total) by mouth daily. 90 tablet 5    traMADol 50 MG Oral Tab Please take 1 tablet every 6 hours as needed for pain. Max of 8 tablets per day. Collaborating - Dr. Jennifer Sandoval. 20 tablet 1    gabapentin 100 MG Oral Cap Take 3 capsules (300 mg total) by mouth nightly. 100mg at bedtime x 1 week then increase to 200mg at bedtime x 1 week then 300mg at bedtime 90 capsule 2    Respiratory Therapy Supplies (NEBULIZER) Does not apply Device Please use every 6 hour as needed for Shortness of breath, wheezing 1 Device 0       ROS: A comprehensive 14 point review of systems was performed and was negative aside from the aforementioned per history of present illness.    SOCIAL HX:  Social History     Tobacco Use    Smoking status: Former     Current packs/day: 0.00     Average packs/day: 0.5 packs/day for 25.0 years (12.5 ttl pk-yrs)     Types: Cigarettes     Start date:      Quit date:      Years since quittin.1     Passive exposure: Past    Smokeless tobacco: Former    Tobacco comments:     N   Substance Use Topics    Alcohol use: Never       PE:   There were no vitals filed for this visit.  Estimated body mass index is 26.92 kg/m² as calculated from the following:    Height as of 25: 5' 11\" (1.803 m).    Weight as of 25: 193 lb (87.5 kg).    Physical Exam  Constitutional:       Appearance: Normal appearance.   HENT:      Head: Normocephalic and atraumatic.   Eyes:      Extraocular Movements: Extraocular movements intact.   Neck:      Musculoskeletal: Normal range of motion and  neck supple.   Cardiovascular:      Pulses: Normal pulses.   Pulmonary:      Effort: Pulmonary effort is normal. No respiratory distress.   Abdominal:      General: There is no distension.   Skin:     General: Skin is warm.      Capillary Refill: Capillary refill takes less than 2 seconds.      Findings: No bruising.   Neurological:      General: No focal deficit present.      Mental Status: Alert.   Psychiatric:         Mood and Affect: Mood normal.     Examination of the right shoulder demonstrates:     Skin is intact, warm and dry.   Cervical:  Full ROM  Spurling's  Negative    Deformity:   none  Atrophy:   none    Scapular winging: Negative    Palpation:     AC Joint   Negative  Biceps Tendon  Positive  Greater Tuberosity Negative    ROM:   Forward Flexion:  150°  Abduction:   full and symmetric  External Rotation:  60°  Internal Rotation:  thoracolumbar junction    Rotator Cuff Strength:   Supraspinatus:   4/5  Subscapularis:   5/5  Infraspinatus/Teres: 5/5    Provocative Tests:   Miller:   Positive  Speed's:   Positive  Plano's:   Positive  Lift-off:    Negative  Apprehension:  Negative  Sulcus Sign:   Negative    Neurovascular Upper Extremity (Bilateral)  Motor:    5/5 EPL, Finger Abduction, , Pinch, Deltoid  Sensation:   intact to light touch median, ulnar, radial and axillary nerve  Circulation:   Normal, 2+ radial pulse    The contralateral upper extremity is without limitation in range of motion or strength, no positive provocative maneuvers.     Radiographic Examination/Diagnostics:    XR and MRI of the shoulder personally viewed, independently interpreted and radiology report was reviewed.    MRI SHOULDER, RIGHT (CPT=73221)    Result Date: 12/31/2024  CONCLUSION:  1. Findings consistent with extrinsic rotator cuff impingement relating to a type 2 acromion and subacromial enthesopathy. 2. Associated rotator cuff tendinopathy and enthesopathy with small focal full-thickness tear along the anterior  distal supraspinatus tendon measuring 1.6 x 0.6 cm. 3. Degenerative labral tearing suggested along the anterior inferior quadrant as described above.  The biceps labral anchor has a normal appearance. 4. Please see further details above.    LOCATION:  [facility]   Dictated by (CST): Gianna Kennedy DO on 12/31/2024 at 1:36 PM     Finalized by (CST): Gianna Kennedy DO on 12/31/2024 at 1:42 PM       XR PAIN CLINIC FLUOROSCOPY - N/C    Result Date: 12/30/2024  CONCLUSION:  Fluoroscopic guidance provided for needle placement.  Please refer to dedicated procedural report for full detail.    LOCATION:  [facility]    Dictated by (CST): Emani Byers MD on 12/30/2024 at 12:10 PM     Finalized by (CST): Emani Byers MD on 12/30/2024 at 12:11 PM       XR PELVIS (1 VIEW) (CPT=72170)    Result Date: 12/24/2024  CONCLUSION:  1. No acute fracture or dislocation. 2. Mild bilateral hip osteoarthritis.    Dictated by (CST): Massimo Dent MD on 12/24/2024 at 2:28 PM     Finalized by (CST): Massimo Dent MD on 12/24/2024 at 2:29 PM          XR KNEE COMPLETE BILAT (CPT=73564)    Result Date: 12/24/2024  CONCLUSION: Moderate symmetric degenerative changes within both knees.  No acute or destructive bony process is seen.    Dictated by (CST): Keith Paulino MD on 12/24/2024 at 11:07 AM     Finalized by (CST): Keith Paulino MD on 12/24/2024 at 11:08 AM          XR SHOULDER, COMPLETE (MIN 2 VIEWS), RIGHT (CPT=73030)    Result Date: 12/18/2024  CONCLUSION:  No acute fractures.  Osteoarthritic changes.   LOCATION:  [facility]   Dictated by (CST): Alphonse Ye MD on 12/18/2024 at 11:34 AM     Finalized by (CST): Alphonse Ye MD on 12/18/2024 at 11:34 AM       MRI SPINE CERVICAL (CPT=72141)    Result Date: 12/10/2024  CONCLUSION:   1. Multilevel degenerative changes of the spine resulting in moderate bilateral foraminal stenosis at C6-7 and mild/moderate foraminal stenosis at the C3-4 and C5-6 levels.  Correlate with radicular symptoms.   No significant central canal stenosis throughout the cervical spine.  2. Normal caliber, contour, and signal intensity of the cervical spinal cord.    LOCATION:  [id number]   Dictated by (CST): Emani Byers MD on 12/10/2024 at 11:02 PM     Finalized by (CST): Emani Byers MD on 12/10/2024 at 11:08 PM          IMPRESSION: Devin Urrutia is a 59 year old male with Right shoulder high grade partial thickness rotator cuff tear in the setting of impingement. At this time we will plan for conservative management with corticosteroid injection and rehabilitation.     In the long run, I discussed the role of surgical intervention including arthroscopic rotator cuff repair, biceps tenodesis, subacromial decompression.    He expressed limitations in the ability proceed with physical therapy at this time.  Documentation and paperwork was provided.    PLAN:   We had a detailed discussion outlining the etiology, anatomy, pathophysiology, and natural history of rotator cuff pathology of the shoulder. Imaging was reviewed in detail and correlated to a 3-dimensional model of the shoulder.     I had a lengthy discussion with Devin about the diagnosis and options, both surgical and nonsurgical. I have recommended that we proceed with arthroscopic rotator cuff repair and likely biceps tenodesis as we agree surgical intervention would likely offer the best opportunity for symptomatic relief and functional recovery. I used imaging studies and a 3-dimensional model to outline his pathology, as well as general surgical principles. We reviewed the risks associated with shoulder arthroscopy.   In particular we discussed risks that include, but are not limited to infection, blood loss, potential transient or permanent injury to nerves or blood vessels, joint stiffness, persistent pain, need for future operation, failure of healing, wound complications, failure of therapeutic intervention, risk of re-injury, fixation failure, deep vein thrombosis  and pulmonary embolism. We discussed the proposed rehabilitation timeline as well as expected postoperative restrictions.     Most post-surgical patients after rotator cuff repair utilize a shoulder immobilizer/sling for approximately ~4 weeks.  Physical therapy is initiated immediately postsurgically with initial passive range of motion, followed by active assisted range of motion, and ultimately active range of motion after the 6 to 8-week jose.  After the 3-month jose postsurgically the restrictions are lifted and continued strengthening is recommended.    We reviewed the treatment of this disease condition.  Fortunately, treatment is amenable to conservative treatment which we chose to optimize at today's visit.  After a discussion of a variety of conservative treatment options, I recommended intra-articular injection with corticosteroid and ketorolac coupled with physical therapy to aid in strengthening, range of motion, functional improvement, and return to baseline activity.      We elected to proceed with the injection procedure at today's visit. We discussed the risk and benefits of the procedure; including, but not limited to: infection, injury to blood vessels, nerve injury, prolonged pain, swelling, site soreness, failure to progress, and need for advanced treatments.  The patient voiced understanding and agreed to proceed with the treatment plan.     The patient had the opportunity to ask questions and all questions were answered appropriately.       FOLLOW-UP:   Post-Operative Visit, POD 6 with Veronicar CLARE Prado MD  Knee, Shoulder, & Elbow Surgery / Sports Medicine Specialist  Orthopaedic Surgery  [address] [place] [address] [id number] [place] [address].org  [email or web address]  t: [phone]  o: [phone]  f: [phone]    This note was dictated using Dragon software.  While it was briefly proofread prior to completion, some grammatical, spelling, and word choice  errors due to dictation may still occur.

## 2025-02-17 NOTE — PATIENT INSTRUCTIONS
Thank you for choosing Felicia Cruz MD at [place] [address]  To Do: Joo Lacey  1. Please take meds as directed. [facility] is located in [address]. Monday, Tuesday & Friday – 8 a.m. to 4 p.m. Wednesday, Thursday – 7 a.m. to 3 p.m.   Rylie Milton those potential risks and we strive to make you healthier and to improve your quality of life.     Referrals, and Radiology Information:    If your insurance requires a referral to a specialist, please allow 5 business days to process your referral request. No

## 2025-02-24 ENCOUNTER — TELEPHONE (OUTPATIENT)
Dept: ORTHOPEDICS CLINIC | Facility: CLINIC | Age: 60
End: 2025-02-24

## 2025-02-24 NOTE — TELEPHONE ENCOUNTER
Patient called back with a representative Arturo from [facility] to follow up on the request below.

## 2025-02-25 ENCOUNTER — OFFICE VISIT (OUTPATIENT)
Age: 60
End: 2025-02-25
Payer: COMMERCIAL

## 2025-02-25 VITALS — DIASTOLIC BLOOD PRESSURE: 72 MMHG | SYSTOLIC BLOOD PRESSURE: 121 MMHG

## 2025-02-25 DIAGNOSIS — M17.0 PRIMARY OSTEOARTHRITIS OF BOTH KNEES: Primary | ICD-10-CM

## 2025-02-25 PROCEDURE — 3074F SYST BP LT 130 MM HG: CPT | Performed by: ORTHOPAEDIC SURGERY

## 2025-02-25 PROCEDURE — 20610 DRAIN/INJ JOINT/BURSA W/O US: CPT | Performed by: ORTHOPAEDIC SURGERY

## 2025-02-25 PROCEDURE — 3078F DIAST BP <80 MM HG: CPT | Performed by: ORTHOPAEDIC SURGERY

## 2025-02-25 NOTE — PROGRESS NOTES
We discussed the diagnosis and treatment options and the patient felt they would benefit from the injection. I described the injection in detail including the risks, which include but are not limited to risk of infection, failure of treatment, local skin discoloration, temporary elevations in blood sugars, inflammatory reaction, persistent pain or increased pain at the injection site. Specifically we cautioned the patient regarding signs of infection and the need for immediate evaluation in this case. The patient verbalized an understanding of these risks and verbally consented to the injection. Allergies were reviewed.     Description of Procedure: An appropriate procedural time-out was performed confirming the patient’s name, date of birth, and the procedure to be performed. Following sterile prep, 2mL of 30mg/2mL Orthovisc #1 was injection into the bilateral knee using the inferolateral approach. There were no complications or difficulties. Hemostasis was achieved. A small sterile band aid dressing was applied. The patient tolerated the procedure well.     Post Injection Instructions: The patient was encouraged to ice the area immediately following the injection and to avoid strenuous activity with the involved extremity for remainder of the day. The patient verbalized an understanding and all of their questions and concerns were addressed.

## 2025-03-04 ENCOUNTER — OFFICE VISIT (OUTPATIENT)
Age: 60
End: 2025-03-04
Payer: COMMERCIAL

## 2025-03-04 VITALS — DIASTOLIC BLOOD PRESSURE: 84 MMHG | SYSTOLIC BLOOD PRESSURE: 134 MMHG

## 2025-03-04 DIAGNOSIS — M17.0 PRIMARY OSTEOARTHRITIS OF BOTH KNEES: Primary | ICD-10-CM

## 2025-03-04 PROCEDURE — 3079F DIAST BP 80-89 MM HG: CPT | Performed by: ORTHOPAEDIC SURGERY

## 2025-03-04 PROCEDURE — 20610 DRAIN/INJ JOINT/BURSA W/O US: CPT | Performed by: ORTHOPAEDIC SURGERY

## 2025-03-04 PROCEDURE — 3075F SYST BP GE 130 - 139MM HG: CPT | Performed by: ORTHOPAEDIC SURGERY

## 2025-03-04 NOTE — PROGRESS NOTES
We discussed the diagnosis and treatment options and the patient felt they would benefit from the injection. I described the injection in detail including the risks, which include but are not limited to risk of infection, failure of treatment, local skin discoloration, temporary elevations in blood sugars, inflammatory reaction, persistent pain or increased pain at the injection site. Specifically we cautioned the patient regarding signs of infection and the need for immediate evaluation in this case. The patient verbalized an understanding of these risks and verbally consented to the injection. Allergies were reviewed.     Description of Procedure: An appropriate procedural time-out was performed confirming the patient’s name, date of birth, and the procedure to be performed. Following sterile prep, 2mL of 30mg/2mL Orthovisc was injection into the bilateral knee using the inferolateral approach. There were no complications or difficulties. Hemostasis was achieved. A small sterile band aid dressing was applied. The patient tolerated the procedure well.     Post Injection Instructions: The patient was encouraged to ice the area immediately following the injection and to avoid strenuous activity with the involved extremity for remainder of the day. The patient verbalized an understanding and all of their questions and concerns were addressed.

## 2025-03-11 ENCOUNTER — OFFICE VISIT (OUTPATIENT)
Age: 60
End: 2025-03-11
Payer: COMMERCIAL

## 2025-03-11 DIAGNOSIS — M17.0 PRIMARY OSTEOARTHRITIS OF BOTH KNEES: Primary | ICD-10-CM

## 2025-03-11 PROCEDURE — 20610 DRAIN/INJ JOINT/BURSA W/O US: CPT | Performed by: ORTHOPAEDIC SURGERY

## 2025-03-11 NOTE — PROGRESS NOTES
We discussed the diagnosis and treatment options and the patient felt they would benefit from the injection. I described the injection in detail including the risks, which include but are not limited to risk of infection, failure of treatment, local skin discoloration, temporary elevations in blood sugars, inflammatory reaction, persistent pain or increased pain at the injection site. Specifically we cautioned the patient regarding signs of infection and the need for immediate evaluation in this case. The patient verbalized an understanding of these risks and verbally consented to the injection. Allergies were reviewed.     Description of Procedure: An appropriate procedural time-out was performed confirming the patient’s name, date of birth, and the procedure to be performed. Following sterile prep, 2mL of 30mg/2mL Orthovisc #3/3 was injection into the bilateral knee using the inferolateral approach. There were no complications or difficulties. Hemostasis was achieved. A small sterile band aid dressing was applied. The patient tolerated the procedure well.     Post Injection Instructions: The patient was encouraged to ice the area immediately following the injection and to avoid strenuous activity with the involved extremity for remainder of the day. The patient verbalized an understanding and all of their questions and concerns were addressed.

## 2025-03-18 ENCOUNTER — OFFICE VISIT (OUTPATIENT)
Age: 60
End: 2025-03-18
Payer: COMMERCIAL

## 2025-03-18 DIAGNOSIS — M17.0 PRIMARY OSTEOARTHRITIS OF BOTH KNEES: Primary | ICD-10-CM

## 2025-03-18 PROCEDURE — 20610 DRAIN/INJ JOINT/BURSA W/O US: CPT | Performed by: ORTHOPAEDIC SURGERY

## 2025-03-18 NOTE — PROGRESS NOTES
We discussed the diagnosis and treatment options and the patient felt they would benefit from the injection. I described the injection in detail including the risks, which include but are not limited to risk of infection, failure of treatment, local skin discoloration, temporary elevations in blood sugars, inflammatory reaction, persistent pain or increased pain at the injection site. Specifically we cautioned the patient regarding signs of infection and the need for immediate evaluation in this case. The patient verbalized an understanding of these risks and verbally consented to the injection. Allergies were reviewed.     Description of Procedure: An appropriate procedural time-out was performed confirming the patient’s name, date of birth, and the procedure to be performed. Following sterile prep, 2mL of 30mg/2mL Orthovisc #4was injection into the bilateral knee using the inferolateral approach. There were no complications or difficulties. Hemostasis was achieved. A small sterile band aid dressing was applied. The patient tolerated the procedure well.     Post Injection Instructions: The patient was encouraged to ice the area immediately following the injection and to avoid strenuous activity with the involved extremity for remainder of the day. The patient verbalized an understanding and all of their questions and concerns were addressed.

## 2025-04-14 ENCOUNTER — OFFICE VISIT (OUTPATIENT)
Dept: FAMILY MEDICINE CLINIC | Facility: CLINIC | Age: 60
End: 2025-04-14
Payer: COMMERCIAL

## 2025-04-14 VITALS
BODY MASS INDEX: 25.2 KG/M2 | DIASTOLIC BLOOD PRESSURE: 74 MMHG | HEART RATE: 72 BPM | HEIGHT: 71 IN | OXYGEN SATURATION: 98 % | SYSTOLIC BLOOD PRESSURE: 118 MMHG | TEMPERATURE: 98 F | WEIGHT: 180 LBS | RESPIRATION RATE: 16 BRPM

## 2025-04-14 DIAGNOSIS — K59.09 CHRONIC CONSTIPATION: Primary | ICD-10-CM

## 2025-04-14 DIAGNOSIS — E03.9 HYPOTHYROIDISM, UNSPECIFIED TYPE: ICD-10-CM

## 2025-04-14 DIAGNOSIS — Z12.5 SCREENING FOR MALIGNANT NEOPLASM OF PROSTATE: ICD-10-CM

## 2025-04-14 DIAGNOSIS — J45.30 MILD PERSISTENT ASTHMA WITHOUT COMPLICATION (HCC): ICD-10-CM

## 2025-04-14 PROCEDURE — G2211 COMPLEX E/M VISIT ADD ON: HCPCS | Performed by: FAMILY MEDICINE

## 2025-04-14 PROCEDURE — 3008F BODY MASS INDEX DOCD: CPT | Performed by: FAMILY MEDICINE

## 2025-04-14 PROCEDURE — 99214 OFFICE O/P EST MOD 30 MIN: CPT | Performed by: FAMILY MEDICINE

## 2025-04-14 PROCEDURE — 3074F SYST BP LT 130 MM HG: CPT | Performed by: FAMILY MEDICINE

## 2025-04-14 PROCEDURE — 3078F DIAST BP <80 MM HG: CPT | Performed by: FAMILY MEDICINE

## 2025-04-14 RX ORDER — METHYLPREDNISOLONE 4 MG/1
TABLET ORAL
Qty: 1 EACH | Refills: 0 | Status: SHIPPED | OUTPATIENT
Start: 2025-04-14

## 2025-04-14 RX ORDER — ALBUTEROL SULFATE 90 UG/1
1 INHALANT RESPIRATORY (INHALATION) EVERY 6 HOURS PRN
Qty: 3 EACH | Refills: 3 | Status: SHIPPED | OUTPATIENT
Start: 2025-04-14

## 2025-04-14 RX ORDER — ATORVASTATIN CALCIUM 10 MG/1
10 TABLET, FILM COATED ORAL NIGHTLY
Qty: 90 TABLET | Refills: 3 | Status: SHIPPED | OUTPATIENT
Start: 2025-04-14

## 2025-04-14 RX ORDER — FLUOCINOLONE ACETONIDE 0.11 MG/ML
OIL TOPICAL
Qty: 118.28 ML | Refills: 0 | Status: SHIPPED | OUTPATIENT
Start: 2025-04-14

## 2025-04-14 RX ORDER — DOCUSATE SODIUM 100 MG/1
100 CAPSULE, LIQUID FILLED ORAL 2 TIMES DAILY PRN
Qty: 30 CAPSULE | Refills: 1 | Status: SHIPPED | OUTPATIENT
Start: 2025-04-14

## 2025-04-14 NOTE — PROGRESS NOTES
Subjective:   Patient ID: Devin Urrutia is a 59 year old male.    HPI  Mr. Urrutia is a pleasant 59-year-old gentleman with history of asthma, hypothyroidism, hyperlipidemia, arthritis, sinusitis, allergic rhinitis presenting today for lower abdominal pain for more than a week.  He has not had a good bowel movement for the past several days.  His friend had recommended for him to increase fiber and also take papaya but with not much improvement.  He was also using MiraLAX but not much improvement.  No fever no weight loss no nausea no vomiting no diarrhea no blood in the stool.  He also has been having chest congestion despite using his inhalers for his asthma.    I had reviewed past medical and family histories together with allergy and medication lists documented.    History/Other:   Review of Systems   Constitutional:  Negative for fatigue, fever and unexpected weight change.   HENT:  Positive for congestion. Negative for sore throat and trouble swallowing.    Respiratory:  Positive for cough. Negative for shortness of breath and wheezing.    Cardiovascular:  Negative for chest pain and palpitations.   Gastrointestinal:  Positive for abdominal pain and constipation. Negative for anal bleeding, blood in stool, diarrhea, nausea and vomiting.   Neurological:  Negative for dizziness.     Current Medications[1]  Allergies:Allergies[2]    Objective:   Physical Exam  Vitals reviewed.   Constitutional:       General: He is not in acute distress.  HENT:      Right Ear: Tympanic membrane and ear canal normal.      Left Ear: Tympanic membrane and ear canal normal.      Mouth/Throat:      Mouth: Mucous membranes are moist.      Pharynx: Oropharynx is clear.   Eyes:      General: No scleral icterus.     Conjunctiva/sclera: Conjunctivae normal.   Cardiovascular:      Rate and Rhythm: Normal rate and regular rhythm.      Heart sounds: Normal heart sounds. No murmur heard.  Pulmonary:      Effort: Pulmonary effort is normal. No  respiratory distress.      Breath sounds: No wheezing or rales.      Comments: Tight airway entry  Abdominal:      General: Bowel sounds are normal. There is no distension.      Palpations: Abdomen is soft. There is no mass.      Tenderness: There is abdominal tenderness. There is no guarding or rebound.   Musculoskeletal:      Cervical back: Neck supple.      Right lower leg: No edema.      Left lower leg: No edema.   Lymphadenopathy:      Cervical: No cervical adenopathy.   Skin:     General: Skin is warm.   Neurological:      General: No focal deficit present.      Mental Status: He is alert.   Psychiatric:         Mood and Affect: Mood normal.         Behavior: Behavior normal.         Assessment & Plan:   1. Chronic constipation   -Continue MiraLAX daily but will also add Colace twice a day  - Will refer to gastroenterology for further evaluation management   2. Hypothyroidism, unspecified type   - Will check thyroid function test  - Will also check other routine labs   3. Screening for malignant neoplasm of prostate    4. Mild persistent asthma without complication (HCC)   - Continue meds  - Will start Medrol Dosepak  - Go to the emergency room if with respiratory distress   Follow-up as scheduled or as needed      This note was prepared using Dragon Medical voice recognition dictation software. As a result errors may occur. When identified these errors have been corrected. While every attempt is made to correct errors during dictation discrepancies may still exist            Orders Placed This Encounter   Procedures    CBC W Differential W Platelet [E]    Lipid Panel [E]    TSH and Free T4 [E]    Comp Metabolic Panel (14) [E]    PSA, Total (Screening) [E]       Meds This Visit:  Requested Prescriptions     Signed Prescriptions Disp Refills    atorvastatin 10 MG Oral Tab 90 tablet 3     Sig: Take 1 tablet (10 mg total) by mouth nightly.    Fluocinolone Acetonide Scalp 0.01 % External Oil 118.28 mL 0     Sig:  Place two drops in each ear twice a day. Use for 30 days. Repeat as needed.    albuterol 108 (90 Base) MCG/ACT Inhalation Aero Soln 3 each 3     Sig: Inhale 1 puff into the lungs every 6 (six) hours as needed.    methylPREDNISolone (MEDROL) 4 MG Oral Tablet Therapy Pack 1 each 0     Sig: As directed.    docusate sodium 100 MG Oral Cap 30 capsule 1     Sig: Take 1 capsule (100 mg total) by mouth 2 (two) times daily as needed for constipation.       Imaging & Referrals:  [facility]STRO - INTERNAL         [1]   Current Outpatient Medications   Medication Sig Dispense Refill    atorvastatin 10 MG Oral Tab Take 1 tablet (10 mg total) by mouth nightly. 90 tablet 3    Fluocinolone Acetonide Scalp 0.01 % External Oil Place two drops in each ear twice a day. Use for 30 days. Repeat as needed. 118.28 mL 0    albuterol 108 (90 Base) MCG/ACT Inhalation Aero Soln Inhale 1 puff into the lungs every 6 (six) hours as needed. 3 each 3    methylPREDNISolone (MEDROL) 4 MG Oral Tablet Therapy Pack As directed. 1 each 0    docusate sodium 100 MG Oral Cap Take 1 capsule (100 mg total) by mouth 2 (two) times daily as needed for constipation. 30 capsule 1    montelukast 10 MG Oral Tab Take 1 tablet (10 mg total) by mouth nightly. 90 tablet 3    fluticasone-umeclidin-vilant (TRELEGY ELLIPTA) 200-62.5-25 MCG/ACT Inhalation Aerosol Powder, Breath Activated Inhale 1 puff into the lungs daily. 60 each 5    budesonide 0.5 MG/2ML Inhalation Suspension Take 2 mL (0.5 mg total) by nebulization daily. 120 mL 5    Meloxicam 15 MG Oral Tab Take 1 tablet (15 mg total) by mouth daily. 90 tablet 0    gabapentin 300 MG Oral Cap One capsule at bedtime 90 capsule 1    Olopatadine HCl 0.2 % Ophthalmic Solution Apply 1 Application to eye 2 (two) times daily as needed. 2.5 mL 1    alfuzosin ER 10 MG Oral Tablet 24 Hr Take 1 tablet (10 mg total) by mouth daily. 90 tablet 5    traMADol 50 MG Oral Tab Please take 1 tablet every 6 hours as needed for pain. Max of 8  tablets per day. Collaborating - Dr. Jennifer Sandoval. 20 tablet 1    gabapentin 100 MG Oral Cap Take 3 capsules (300 mg total) by mouth nightly. 100mg at bedtime x 1 week then increase to 200mg at bedtime x 1 week then 300mg at bedtime 90 capsule 2    Respiratory Therapy Supplies (NEBULIZER) Does not apply Device Please use every 6 hour as needed for Shortness of breath, wheezing 1 Device 0   [2] No Known Allergies

## 2025-04-14 NOTE — PATIENT INSTRUCTIONS
Thank you for choosing Braxton Rivas MD at [facility]  To Do: Devin Urrutia  1. Please see below   Call [phone] to schedule the appointment.   Please signup for [email or web address], which is electronic access to your record if you have not done so.  All your results will post on there.  [email or web address]   You can NOW use [email or web address] to book your appointments with us, or consider using open access scheduling which is through the [place] website [email or web address] and type in Braxton Rivas MD and follow the links for \"Schedule Online Now\"    To schedule Imaging or tests at [facility] call Central Scheduling [phone], Go to [facility] ER Building (For example: CT scans, X rays, Ultrasound, MRI)  Cardiac Testing in ER building Building A second floor Cardiac Testing [phone] (For example: Holter Monitor, Cardiac Stress tests,Event Monitor, or 2D Echocardiograms)  [facility] call [phone] usually in [facility]  Walk in Clinic in [place] at [address] Mon-Fri at 8am-7:30 p.m., and Sat/Sun 9:00a.m.-4:30 p.m.  Also at [address] [place] [address]  Call [phone] for info     Please call our office about any questions regarding your treatment/medicines/tests as a result of today's visit.  For your safety, read the entire package insert of all medicines prescribed to you and be aware of all of the risks of treatment even beyond those discussed today.  All therapies have potential risk of harm or side effects or medication interactions.  It is your duty and for your safety to discuss with the pharmacist and our office with questions, and to notify us and stop treatment if problems arise, but know that our intention is that the benefits outweigh those potential risks and we strive to make you healthier and to improve your quality of life.    Referrals, and Radiology Information:    If your insurance requires a referral to a specialist, please allow 5 business days to process your  referral request.    If Braxton Rivas MD orders a CT or MRI, it may take up to 10 business days to receive approval from your insurance company. Once our office has called informing you that the insurance company approved your testing, please call Central Scheduling at [phone]  Please allow our office 5 business days to contact you regarding any testing results.    Refill policies:   Allow 3 business days for refills; controlled substances may take longer and must be picked up from the office in person.  Narcotic medications can only be filled in 30 day increments and must be refilled at an office visit only.  If your prescription is due for a refill, you may be due for a follow-up appointment.  We cannot refill your maintenance medications at a preventative wellness visit.  To best provide you care, patients receiving maintenance medications need to be seen at least twice a year.

## 2025-04-17 ENCOUNTER — TELEPHONE (OUTPATIENT)
Dept: FAMILY MEDICINE CLINIC | Facility: CLINIC | Age: 60
End: 2025-04-17

## 2025-04-17 DIAGNOSIS — K59.09 CHRONIC CONSTIPATION: Primary | ICD-10-CM

## 2025-04-17 NOTE — TELEPHONE ENCOUNTER
New referral placed.    Current status: OPEN    Will wait for if/when referral is authorized.  Then will fax.     MCM sent to patient.

## 2025-04-17 NOTE — TELEPHONE ENCOUNTER
Patient requesting new referral to GI, was referred to Dr Ontiveros. Specialist does not accept insurance, patient looking to see in-network GI-Dr Juancho Valdivia.

## 2025-04-28 ENCOUNTER — TELEPHONE (OUTPATIENT)
Dept: ADMINISTRATIVE | Age: 60
End: 2025-04-28

## 2025-04-28 DIAGNOSIS — Z09 FOLLOW-UP EXAM, 7 MONTHS TO 1 YEAR SINCE PREVIOUS EXAM: Primary | ICD-10-CM

## 2025-04-28 NOTE — TELEPHONE ENCOUNTER
Patient request  Referral  to see Pulmonology([place])please review and sign plan and care if you agree Thank you.                            Wayne SCOTT           [facility].

## 2025-06-04 ENCOUNTER — TELEMEDICINE (OUTPATIENT)
Dept: FAMILY MEDICINE CLINIC | Facility: CLINIC | Age: 60
End: 2025-06-04
Payer: COMMERCIAL

## 2025-06-04 DIAGNOSIS — J30.1 NON-SEASONAL ALLERGIC RHINITIS DUE TO POLLEN: ICD-10-CM

## 2025-06-04 DIAGNOSIS — J01.91 ACUTE RECURRENT SINUSITIS, UNSPECIFIED LOCATION: Primary | ICD-10-CM

## 2025-06-04 PROCEDURE — 98005 SYNCH AUDIO-VIDEO EST LOW 20: CPT | Performed by: FAMILY MEDICINE

## 2025-06-04 RX ORDER — METHYLPREDNISOLONE 4 MG/1
TABLET ORAL
Qty: 1 EACH | Refills: 0 | Status: SHIPPED | OUTPATIENT
Start: 2025-06-04

## 2025-06-04 RX ORDER — GABAPENTIN 300 MG/1
300 CAPSULE ORAL NIGHTLY
Qty: 90 CAPSULE | Refills: 1 | Status: SHIPPED | OUTPATIENT
Start: 2025-06-04

## 2025-06-04 NOTE — PROGRESS NOTES
Subjective:   Patient ID: Devin Urrutia is a 60 year old male.    HPI  Mr. Urrutia is a pleasant 60-year-old gentleman with history of asthma, hypothyroidism, hyperlipidemia, arthritis, sinusitis, allergic rhinitis presenting today for video visit for sinus congestion for the past few days associated with runny nose and sneezing.  He does have history of allergic rhinitis and has taken over-the-counter allergy medications but without much improvement.  When he has had symptoms as such this would for the most part advanced to sinus infections.  No fever no chest pain no shortness of breath that he reports.  He also would like refills for gabapentin that was prescribed by his rheumatologist.      I had reviewed past medical and family histories together with allergy and medication lists documented.    History/Other:   Review of Systems   Constitutional:  Negative for fever.   Respiratory:  Negative for cough and shortness of breath.      Current Medications[1]  Allergies:Allergies[2]    Objective:   Physical Exam  Constitutional:       General: He is not in acute distress.  Neurological:      Mental Status: He is alert.         Assessment & Plan:   1. Acute recurrent sinusitis, unspecified location    2. Non-seasonal allergic rhinitis due to pollen    -Will refer to allergist for management and evaluation of allergic rhinitis  - Will prescribe with Medrol Dosepak and Augmentin which has been effective for him in the past  - Will renew gabapentin 300 minutes at nighttime but continue follow-up with his rheumatology specialist  -Keep hydrated  - May take Tylenol as needed for fever pain  - Go to the emergency room with respiratory distress and/or severe dehydration.    This note was prepared using Dragon Medical voice recognition dictation software. As a result errors may occur. When identified these errors have been corrected. While every attempt is made to correct errors during dictation discrepancies may still exist             No orders of the defined types were placed in this encounter.      Meds This Visit:  Requested Prescriptions     Signed Prescriptions Disp Refills    amoxicillin clavulanate 875-125 MG Oral Tab 20 tablet 0     Sig: Take 1 tablet by mouth 2 (two) times daily for 10 days.    methylPREDNISolone (MEDROL) 4 MG Oral Tablet Therapy Pack 1 each 0     Sig: As directed.    gabapentin 300 MG Oral Cap 90 capsule 1     Sig: Take 1 capsule (300 mg total) by mouth nightly.       Imaging & Referrals:  ALLERGY - INTERNAL         [1]   Current Outpatient Medications   Medication Sig Dispense Refill    amoxicillin clavulanate 875-125 MG Oral Tab Take 1 tablet by mouth 2 (two) times daily for 10 days. 20 tablet 0    methylPREDNISolone (MEDROL) 4 MG Oral Tablet Therapy Pack As directed. 1 each 0    gabapentin 300 MG Oral Cap Take 1 capsule (300 mg total) by mouth nightly. 90 capsule 1    atorvastatin 10 MG Oral Tab Take 1 tablet (10 mg total) by mouth nightly. 90 tablet 3    Fluocinolone Acetonide Scalp 0.01 % External Oil Place two drops in each ear twice a day. Use for 30 days. Repeat as needed. 118.28 mL 0    albuterol 108 (90 Base) MCG/ACT Inhalation Aero Soln Inhale 1 puff into the lungs every 6 (six) hours as needed. 3 each 3    methylPREDNISolone (MEDROL) 4 MG Oral Tablet Therapy Pack As directed. 1 each 0    docusate sodium 100 MG Oral Cap Take 1 capsule (100 mg total) by mouth 2 (two) times daily as needed for constipation. 30 capsule 1    montelukast 10 MG Oral Tab Take 1 tablet (10 mg total) by mouth nightly. 90 tablet 3    fluticasone-umeclidin-vilant (TRELEGY ELLIPTA) 200-62.5-25 MCG/ACT Inhalation Aerosol Powder, Breath Activated Inhale 1 puff into the lungs daily. 60 each 5    budesonide 0.5 MG/2ML Inhalation Suspension Take 2 mL (0.5 mg total) by nebulization daily. 120 mL 5    Meloxicam 15 MG Oral Tab Take 1 tablet (15 mg total) by mouth daily. 90 tablet 0    Olopatadine HCl 0.2 % Ophthalmic Solution Apply 1  Application to eye 2 (two) times daily as needed. 2.5 mL 1    alfuzosin ER 10 MG Oral Tablet 24 Hr Take 1 tablet (10 mg total) by mouth daily. 90 tablet 5    traMADol 50 MG Oral Tab Please take 1 tablet every 6 hours as needed for pain. Max of 8 tablets per day. Collaborating - Dr. Jennifer Sandoval. 20 tablet 1    Respiratory Therapy Supplies (NEBULIZER) Does not apply Device Please use every 6 hour as needed for Shortness of breath, wheezing 1 Device 0   [2] No Known Allergies

## (undated) DEVICE — KNEE ARTHROSCOPY CDS: Brand: MEDLINE INDUSTRIES, INC.

## (undated) DEVICE — #15 STERILE STAINLESS BLADE: Brand: STERILE STAINLESS BLADES

## (undated) DEVICE — SHEET,DRAPE,40X58,STERILE: Brand: MEDLINE

## (undated) DEVICE — GLOVE SUR 7 SENSICARE PI PIP CRM PWD F

## (undated) DEVICE — SLEEVE COMPR MD KNEE LEN SGL USE KENDALL SCD

## (undated) DEVICE — GLOVE SUR 7.5 SENSICARE PI PIP GRN PWD F

## (undated) DEVICE — TUBING PMP L16FT DISP INFLOW

## (undated) DEVICE — MEDI-VAC NON-CONDUCTIVE SUCTION TUBING: Brand: CARDINAL HEALTH

## (undated) DEVICE — SUPER TURBOVAC 90 IFS: Brand: COBLATION

## (undated) DEVICE — SUT MCRYL 3-0 27IN ABSRB UD 19MM PS-2 3/8

## (undated) DEVICE — COVER LT HNDL PLAS RIG 2 PER PK

## (undated) DEVICE — COVER,MAYO STAND,STERILE: Brand: MEDLINE

## (undated) DEVICE — 3M™ STERI-STRIP™ REINFORCED ADHESIVE SKIN CLOSURES, R1547, 1/2 IN X 4 IN (12 MM X 100 MM), 6 STRIPS/ENVELOPE: Brand: 3M™ STERI-STRIP™

## (undated) DEVICE — ADHESIVE SKIN TOP FOR WND CLSR DERMBND ADV

## (undated) DEVICE — DRAPE STERI 1000 UTIL AD

## (undated) DEVICE — STANDARD HYPODERMIC NEEDLE,POLYPROPYLENE HUB: Brand: MONOJECT

## (undated) DEVICE — SLEEVE COMPR M KNEE LEN SGL USE KENDALL SCD

## (undated) DEVICE — REMOVER LOT 4OZ N IRRIG UNSCNT SFT MOIST LIQ

## (undated) DEVICE — TOWEL SURG SM W12XL18IN CLR PLAS TEAR RESIST

## (undated) DEVICE — AGGRESSIVE PLUS, ANGLED CUTTER: Brand: FORMULA

## (undated) DEVICE — ZZ- DISC- NOSUB-SOLUTION RUBBING 4OZ 70% ISO ALC CLR

## (undated) DEVICE — 3M™ TEGADERM™ +PAD FILM DRESSING WITH NON-ADHERENT PAD, 3584, 2-3/8 IN X 4 IN (6 CM X 10 CM), 50/CAR, 4 CAR/CS: Brand: 3M™ TEGADERM™

## (undated) DEVICE — SPONGE GZ 4XL4IN 100% COT 12 PLY TYP VII WVN

## (undated) DEVICE — PAIN TRAY: Brand: MEDLINE INDUSTRIES, INC.

## (undated) DEVICE — SUTURE MCRYL 3-0 27IN ABSRB UD 19MM PS-2 3/8

## (undated) DEVICE — SOLUTION IRRIG 3000ML 0.9% NACL FLX CONT

## (undated) DEVICE — GLOVE SUR 7.5 SENSICARE PIP WHT PWD F

## (undated) DEVICE — 3M™ TEGADERM™ +PAD FILM DRESSING WITH NON-ADHERENT PAD, 3587, 3-1/2 IN X 4-1/8 IN (9 CM X 10.5 CM), 25/CAR, 4 CAR/CS: Brand: 3M™ TEGADERM™

## (undated) DEVICE — AVANOS* TUOHY EPIDURAL NEEDLE: Brand: AVANOS

## (undated) DEVICE — SYRINGE 10ML SLIP TIP LOSS OF RESIST PLAS

## (undated) DEVICE — GLOVE SUR 7.5 SENSICARE PI PIP CRM PWD F

## (undated) DEVICE — KIT PLTLT CONC SYS BLD BONE MAR ASPIRATE

## (undated) DEVICE — BANDAGE ADH 1INX3IN NAT FAB N ADH PD CURAD

## (undated) DEVICE — KIT SEP W/ BLD DRAW TB SYR NDL TRNQT PD

## (undated) DEVICE — SOLUTION RUBBING 4OZ 70% ISO ALC CLR

## (undated) DEVICE — TUBING PMP L16FT DISP

## (undated) DEVICE — SPONGE 4X4 10PK

## (undated) DEVICE — GLOVE,SURG,SENSICARE,ALOE,LF,PF,7: Brand: MEDLINE

## (undated) NOTE — LETTER
ASTHMA ACTION PLAN for Isaac Vogel     : 1965     Date: 2023  Provider:  Vera Carrion MD  Phone for doctor or clinic: [facility], [address] [facility] , [address] [place] [id number]  [phone]    ACT Score: 25      You can use the colors of a traffic light to help learn about your asthma medicines. 1. Green - Go! % of Personal Best Peak Flow Use controller medicine. Breathing is good  No cough or wheeze  Can work and play Medicine How much to take When to take it    Singulair (Montelukast) 10 mg by mouth daily  Flovent inhaler 110 mcg, 2 puffs twice daily        2. Yellow - Caution. 50-79% Personal Best Peak  Flow. Use reliever medicine to keep an asthma attack from getting bad. Cough  Wheezing  Tight Chest  Wake up at night Medicine How much to take When to take it    Albuterol inhaler,  2 puffs every 6 (six) hours as needed. Albuterol 2.5 mg nebules, use in nebulizer every  6 hours as needed         Additional instructions         3. Red - Stop! Danger!  <50% Personal Best Peak  Flow. Take these medications until  Get help from a doctor   Medicine not helping  Breathing is hard and fast  Nose opens wide  Can't walk  Ribs show  Can't talk well Medicine How much to take When to take it    Albuterol Inhaler, 2 puffs every 20 minutes up to 3 times in a row for severe symptoms on the way to the Emergency Room. Additional Instructions If your symptoms do not improve and you cannot contact your doctor, [facility] to [facility] or call 911 immediately! [x] Asthma Action Plan reviewed with patient (and caregiver if necessary) and a copy of the plan was given to the patient/caregiver. [] Asthma Action Plan reviewed with patient (and caregiver if necessary) on the phone and mailed copy to patient or submitted via [address]      Signatures:  Provider  Vera Carrion MD   Patient Caretaker

## (undated) NOTE — LETTER
AUTHORIZATION FOR SURGICAL OPERATION OR OTHER PROCEDURE    1. I hereby authorize Dr. Patel, and [facility] staff assigned to my case to perform the following operation and/or procedure at the [facility] site:        _______________________________________________________________________________________________      _______________________________________________________________________________________________    2.  My physician has explained the nature and purpose of the operation or other procedure, possible alternative methods of treatment, the risks involved, and the possibility of complication to me.  I acknowledge that no guarantee has been made as to the result that may be obtained.  3.  I recognize that, during the course of this operation, or other procedure, unforseen conditions may necessitate additional or different procedure than those listed above.  I, therefore, further authorize and request that the above named physician, his/her physician assistants or designees perform such procedures as are, in his/her professional opinion, necessary and desirable.  4.  Any tissue or organs removed in the operation or other procedure may be disposed of by and at the discretion of the [facility] and [facility].  5.  I understand that in the event of a medical emergency, I will be transported by local paramedics to [facility] or other hospital emergency department.  6.  I certify that I have read and fully understand the above consent to operation and/or other procedure.    7.  I acknowledge that my physician has explained sedation/analgesia administration to me including the risks and benefits.  I consent to the administration of sedation/analgesia as may be necessary or desirable in the judgement of my physician.    Witness signature: ___________________________________________________ Date:  ______/______/_____                    Time:  ________  A.M.  P.M.       Patient Name:  ______________________________________________________  (please print)      Patient signature:  ___________________________________________________             Relationship to Patient:           []  Parent    Responsible person                          []  Spouse  In case of minor or                    [] Other  _____________   Incompetent name:  __________________________________________________                               (please print)      _____________      Responsible person  In case of minor or  Incompetent signature:  _______________________________________________    Statement of Physician  My signature below affirms that prior to the time of the procedure, I have explained to the patient and/or his/her guardian, the risks and benefits involved in the proposed treatment and any reasonable alternative to the proposed treatment.  I have also explained the risks and benefits involved in the refusal of the proposed treatment and have answered the patient's questions.                        Date:  ______/______/_______  Provider                      Signature:  __________________________________________________________       Time:  ___________ A.M    P.M.

## (undated) NOTE — Clinical Note
Johanny man, this is a gentleman with mild / moderate knee OA. Refractory to injections (steroid and gel) + arthroscopy and BMAC. I recommended a convo with you.   Thanks!  Jennifer

## (undated) NOTE — LETTER
ASTHMA ACTION PLAN for Christiano Nicholson     : 1965     Date: 2019  Provider:  Felicia Cruz MD  Phone for doctor or clinic: [facility], [address] [facility] , [address] [facility] [id number] [phone]    [facility]

## (undated) NOTE — LETTER
AUTHORIZATION FOR SURGICAL OPERATION OR OTHER PROCEDURE    1. I hereby authorize Dr. Patel, and [facility] staff assigned to my case to perform the following operation and/or procedure at the [facility] site:      Orthovisc #4 to bilateral knees   _______________________________________________________________________________________________      _______________________________________________________________________________________________    2.  My physician has explained the nature and purpose of the operation or other procedure, possible alternative methods of treatment, the risks involved, and the possibility of complication to me.  I acknowledge that no guarantee has been made as to the result that may be obtained.  3.  I recognize that, during the course of this operation, or other procedure, unforseen conditions may necessitate additional or different procedure than those listed above.  I, therefore, further authorize and request that the above named physician, his/her physician assistants or designees perform such procedures as are, in his/her professional opinion, necessary and desirable.  4.  Any tissue or organs removed in the operation or other procedure may be disposed of by and at the discretion of the [facility] and [facility].  5.  I understand that in the event of a medical emergency, I will be transported by local paramedics to [facility] or other hospital emergency department.  6.  I certify that I have read and fully understand the above consent to operation and/or other procedure.    7.  I acknowledge that my physician has explained sedation/analgesia administration to me including the risks and benefits.  I consent to the administration of sedation/analgesia as may be necessary or desirable in the judgement of my physician.    Witness signature: ___________________________________________________ Date:   ______/______/_____                    Time:  ________ A.M.  P.M.       Patient Name:  ______________________________________________________  (please print)      Patient signature:  ___________________________________________________             Relationship to Patient:           []  Parent    Responsible person                          []  Spouse  In case of minor or                    [] Other  _____________   Incompetent name:  __________________________________________________                               (please print)      _____________      Responsible person  In case of minor or  Incompetent signature:  _______________________________________________    Statement of Physician  My signature below affirms that prior to the time of the procedure, I have explained to the patient and/or his/her guardian, the risks and benefits involved in the proposed treatment and any reasonable alternative to the proposed treatment.  I have also explained the risks and benefits involved in the refusal of the proposed treatment and have answered the patient's questions.                        Date:  ______/______/_______  Provider                      Signature:  __________________________________________________________       Time:  ___________ A.M    P.M.

## (undated) NOTE — LETTER
AUTHORIZATION FOR SURGICAL OPERATION OR OTHER PROCEDURE    1. I hereby authorize Dr. Patel, and [facility] staff assigned to my case to perform the following operation and/or procedure at the [facility] site:    Bilateral knee Orthovisc injections  #3   _______________________________________________________________________________________________      _______________________________________________________________________________________________    2.  My physician has explained the nature and purpose of the operation or other procedure, possible alternative methods of treatment, the risks involved, and the possibility of complication to me.  I acknowledge that no guarantee has been made as to the result that may be obtained.  3.  I recognize that, during the course of this operation, or other procedure, unforseen conditions may necessitate additional or different procedure than those listed above.  I, therefore, further authorize and request that the above named physician, his/her physician assistants or designees perform such procedures as are, in his/her professional opinion, necessary and desirable.  4.  Any tissue or organs removed in the operation or other procedure may be disposed of by and at the discretion of the [facility] and [facility].  5.  I understand that in the event of a medical emergency, I will be transported by local paramedics to [facility] or other hospital emergency department.  6.  I certify that I have read and fully understand the above consent to operation and/or other procedure.    7.  I acknowledge that my physician has explained sedation/analgesia administration to me including the risks and benefits.  I consent to the administration of sedation/analgesia as may be necessary or desirable in the judgement of my physician.    Witness signature: ___________________________________________________ Date:   ______/______/_____                    Time:  ________ A.M.  P.M.       Patient Name:  ______________________________________________________  (please print)      Patient signature:  ___________________________________________________             Relationship to Patient:           []  Parent    Responsible person                          []  Spouse  In case of minor or                    [] Other  _____________   Incompetent name:  __________________________________________________                               (please print)      _____________      Responsible person  In case of minor or  Incompetent signature:  _______________________________________________    Statement of Physician  My signature below affirms that prior to the time of the procedure, I have explained to the patient and/or his/her guardian, the risks and benefits involved in the proposed treatment and any reasonable alternative to the proposed treatment.  I have also explained the risks and benefits involved in the refusal of the proposed treatment and have answered the patient's questions.                        Date:  ______/______/_______  Provider                      Signature:  __________________________________________________________       Time:  ___________ A.M    P.M.

## (undated) NOTE — LETTER
Patient Name: Leroy Sesay  : 1965  MRN: [id number]  Patient Address: [address] [phone]      Coronavirus Disease 2019 (COVID-19)     [facility] is committed to the safety and well-being of our patients, members, carefully. If your symptoms get worse, call your healthcare provider immediately. 3. Get rest and stay hydrated.    4. If you have a medical appointment, call the healthcare provider ahead of time and tell them that you have or may have COVID-19.  5. For m of fever-reducing medications; and  · Improvement in respiratory symptoms (e.g., cough, shortness of breath); and  · At least 10 days have passed since symptoms first appeared OR if asymptomatic patient or date of symptom onset is unclear then use 10 days donors must:    · Have had a confirmed diagnosis of COVID-19  · Be symptom-free for at least 14 days*    *Some people will be required to have a repeat COVID-19 test in order to be eligible to donate.  If you’re instructed by Ayanna that a repeat test is r random. Researchers are trying to identify similarities between people with a Post-COVID condition to better understand if there are risk factors. How do I prevent a Post-COVID condition?   The best way to prevent the long-term symptoms of COVID-19 is

## (undated) NOTE — LETTER
ASTHMA ACTION PLAN for Stephen Bardales     : 1965     Date: 2021  Provider:  Srinath Hannah MD  Phone for doctor or clinic: [address] [facility], [address] [place] [address] , [address] [place], [place] [address] [phone]

## (undated) NOTE — LETTER
AUTHORIZATION FOR SURGICAL OPERATION OR OTHER PROCEDURE    1. I hereby authorize Dr. Patel, and [facility] staff assigned to my case to perform the following operation and/or procedure at the [facility] site:      _______________________________________________________________________________________________      _______________________________________________________________________________________________    2.  My physician has explained the nature and purpose of the operation or other procedure, possible alternative methods of treatment, the risks involved, and the possibility of complication to me.  I acknowledge that no guarantee has been made as to the result that may be obtained.  3.  I recognize that, during the course of this operation, or other procedure, unforseen conditions may necessitate additional or different procedure than those listed above.  I, therefore, further authorize and request that the above named physician, his/her physician assistants or designees perform such procedures as are, in his/her professional opinion, necessary and desirable.  4.  Any tissue or organs removed in the operation or other procedure may be disposed of by and at the discretion of the [facility] and [facility].  5.  I understand that in the event of a medical emergency, I will be transported by local paramedics to [facility] or other hospital emergency department.  6.  I certify that I have read and fully understand the above consent to operation and/or other procedure.    7.  I acknowledge that my physician has explained sedation/analgesia administration to me including the risks and benefits.  I consent to the administration of sedation/analgesia as may be necessary or desirable in the judgement of my physician.    Witness signature: ___________________________________________________ Date:  ______/______/_____                    Time:  ________ A.M.   P.M.       Patient Name:  ______________________________________________________  (please print)      Patient signature:  ___________________________________________________             Relationship to Patient:           []  Parent    Responsible person                          []  Spouse  In case of minor or                    [] Other  _____________   Incompetent name:  __________________________________________________                               (please print)      _____________      Responsible person  In case of minor or  Incompetent signature:  _______________________________________________    Statement of Physician  My signature below affirms that prior to the time of the procedure, I have explained to the patient and/or his/her guardian, the risks and benefits involved in the proposed treatment and any reasonable alternative to the proposed treatment.  I have also explained the risks and benefits involved in the refusal of the proposed treatment and have answered the patient's questions.                        Date:  ______/______/_______  Provider                      Signature:  __________________________________________________________       Time:  ___________ A.M    P.M.